# Patient Record
Sex: FEMALE | Race: BLACK OR AFRICAN AMERICAN | Employment: FULL TIME | URBAN - METROPOLITAN AREA
[De-identification: names, ages, dates, MRNs, and addresses within clinical notes are randomized per-mention and may not be internally consistent; named-entity substitution may affect disease eponyms.]

---

## 2017-01-02 ENCOUNTER — HOSPITAL ENCOUNTER (OUTPATIENT)
Dept: MAMMOGRAPHY | Age: 47
Discharge: HOME | End: 2017-01-02
Attending: NURSE PRACTITIONER
Payer: COMMERCIAL

## 2017-01-02 ENCOUNTER — HOSPITAL ENCOUNTER (OUTPATIENT)
Dept: ULTRASOUND IMAGING | Age: 47
Discharge: HOME | End: 2017-01-02
Attending: NURSE PRACTITIONER
Payer: COMMERCIAL

## 2017-01-02 DIAGNOSIS — Z00.00 ROUTINE GENERAL MEDICAL EXAMINATION AT A HEALTH CARE FACILITY: ICD-10-CM

## 2017-01-02 DIAGNOSIS — N92.0 MENORRHAGIA WITH REGULAR CYCLE: ICD-10-CM

## 2017-01-02 DIAGNOSIS — Z12.31 ENCOUNTER FOR SCREENING MAMMOGRAM FOR MALIGNANT NEOPLASM OF BREAST: ICD-10-CM

## 2017-01-02 PROCEDURE — 77067 SCR MAMMO BI INCL CAD: CPT

## 2017-01-02 PROCEDURE — 76856 US EXAM PELVIC COMPLETE: CPT

## 2017-01-02 PROCEDURE — 76830 TRANSVAGINAL US NON-OB: CPT

## 2017-01-04 ENCOUNTER — LAB ENCOUNTER (OUTPATIENT)
Dept: LAB | Age: 47
End: 2017-01-04
Attending: OBSTETRICS & GYNECOLOGY
Payer: COMMERCIAL

## 2017-01-04 DIAGNOSIS — N76.0 VAGINITIS AND VULVOVAGINITIS, UNSPECIFIED: ICD-10-CM

## 2017-01-04 DIAGNOSIS — Z01.419 WELL WOMAN EXAM: ICD-10-CM

## 2017-01-04 PROCEDURE — 87510 GARDNER VAG DNA DIR PROBE: CPT

## 2017-01-04 PROCEDURE — 87480 CANDIDA DNA DIR PROBE: CPT

## 2017-01-04 PROCEDURE — 87624 HPV HI-RISK TYP POOLED RSLT: CPT

## 2017-01-04 PROCEDURE — 88175 CYTOPATH C/V AUTO FLUID REDO: CPT

## 2017-01-04 PROCEDURE — 87660 TRICHOMONAS VAGIN DIR PROBE: CPT

## 2017-01-06 LAB — HPV I/H RISK 1 DNA SPEC QL NAA+PROBE: NEGATIVE

## 2017-02-16 RX ORDER — DOXEPIN HYDROCHLORIDE 50 MG/1
1 CAPSULE ORAL DAILY
COMMUNITY
End: 2017-10-26

## 2017-02-20 ENCOUNTER — APPOINTMENT (OUTPATIENT)
Dept: LAB | Age: 47
End: 2017-02-20
Payer: COMMERCIAL

## 2017-02-20 DIAGNOSIS — D21.9 FIBROIDS: ICD-10-CM

## 2017-02-20 LAB
ATRIAL RATE: 68 BPM
BUN BLD-MCNC: 12 MG/DL (ref 8–20)
CALCIUM BLD-MCNC: 9 MG/DL (ref 8.3–10.3)
CHLORIDE: 106 MMOL/L (ref 101–111)
CO2: 31 MMOL/L (ref 22–32)
CREAT BLD-MCNC: 0.81 MG/DL (ref 0.55–1.02)
ERYTHROCYTE [DISTWIDTH] IN BLOOD BY AUTOMATED COUNT: 12.7 % (ref 11.5–16)
GLUCOSE BLD-MCNC: 98 MG/DL (ref 70–99)
HCT VFR BLD AUTO: 39.4 % (ref 34–50)
HGB BLD-MCNC: 13.2 G/DL (ref 12–16)
MCH RBC QN AUTO: 29.7 PG (ref 27–33.2)
MCHC RBC AUTO-ENTMCNC: 33.5 G/DL (ref 31–37)
MCV RBC AUTO: 88.7 FL (ref 81–100)
P AXIS: 40 DEGREES
P-R INTERVAL: 178 MS
PLATELET # BLD AUTO: 276 10(3)UL (ref 150–450)
POTASSIUM SERPL-SCNC: 3.9 MMOL/L (ref 3.6–5.1)
Q-T INTERVAL: 386 MS
QRS DURATION: 88 MS
QTC CALCULATION (BEZET): 410 MS
R AXIS: -14 DEGREES
RBC # BLD AUTO: 4.44 X10(6)UL (ref 3.8–5.1)
RED CELL DISTRIBUTION WIDTH-SD: 41.5 FL (ref 35.1–46.3)
SODIUM SERPL-SCNC: 141 MMOL/L (ref 136–144)
T AXIS: 20 DEGREES
VENTRICULAR RATE: 68 BPM
WBC # BLD AUTO: 5.3 X10(3) UL (ref 4–13)

## 2017-02-20 PROCEDURE — 85027 COMPLETE CBC AUTOMATED: CPT

## 2017-02-20 PROCEDURE — 93010 ELECTROCARDIOGRAM REPORT: CPT | Performed by: INTERNAL MEDICINE

## 2017-02-20 PROCEDURE — 93005 ELECTROCARDIOGRAM TRACING: CPT

## 2017-02-20 PROCEDURE — 80048 BASIC METABOLIC PNL TOTAL CA: CPT

## 2017-02-20 PROCEDURE — 36415 COLL VENOUS BLD VENIPUNCTURE: CPT

## 2017-03-09 ENCOUNTER — ANESTHESIA EVENT (OUTPATIENT)
Dept: SURGERY | Facility: HOSPITAL | Age: 47
End: 2017-03-09

## 2017-03-09 NOTE — H&P
[facility]    PATIENT'S NAME: Fransisco Butcher   ATTENDING PHYSICIAN: Errol Ruvalcaba M.D.    PATIENT ACCOUNT#:   [de-identified]    LOCATION:  [facility]  MEDICAL RECORD #:   [id number]       YOB: 1970  ADMISSION DATE:       03/10/2017    HISTO

## 2017-03-10 ENCOUNTER — ANESTHESIA (OUTPATIENT)
Dept: SURGERY | Facility: HOSPITAL | Age: 47
End: 2017-03-10

## 2017-03-10 ENCOUNTER — HOSPITAL ENCOUNTER (INPATIENT)
Facility: HOSPITAL | Age: 47
LOS: 3 days | Discharge: HOME | DRG: 742 | End: 2017-03-13
Attending: OBSTETRICS & GYNECOLOGY | Admitting: OBSTETRICS & GYNECOLOGY
Payer: COMMERCIAL

## 2017-03-10 ENCOUNTER — SURGERY (OUTPATIENT)
Age: 47
End: 2017-03-10

## 2017-03-10 DIAGNOSIS — D21.9 FIBROIDS: Primary | ICD-10-CM

## 2017-03-10 LAB
BASOPHILS # BLD AUTO: 0.02 X10(3) UL (ref 0–0.1)
BASOPHILS NFR BLD AUTO: 0.4 %
EOSINOPHIL # BLD AUTO: 0.23 X10(3) UL (ref 0–0.3)
EOSINOPHIL NFR BLD AUTO: 4.4 %
ERYTHROCYTE [DISTWIDTH] IN BLOOD BY AUTOMATED COUNT: 12.5 % (ref 11.5–16)
HCT VFR BLD AUTO: 36.9 % (ref 34–50)
HGB BLD-MCNC: 12.8 G/DL (ref 12–16)
IMMATURE GRANULOCYTE COUNT: 0.01 X10(3) UL (ref 0–1)
IMMATURE GRANULOCYTE RATIO %: 0.2 %
LYMPHOCYTES # BLD AUTO: 1.87 X10(3) UL (ref 0.9–4)
LYMPHOCYTES NFR BLD AUTO: 35.9 %
MCH RBC QN AUTO: 29.8 PG (ref 27–33.2)
MCHC RBC AUTO-ENTMCNC: 34.7 G/DL (ref 31–37)
MCV RBC AUTO: 86 FL (ref 81–100)
MONOCYTES # BLD AUTO: 0.48 X10(3) UL (ref 0.1–0.6)
MONOCYTES NFR BLD AUTO: 9.2 %
NEUTROPHIL ABS PRELIM: 2.6 X10 (3) UL (ref 1.3–6.7)
NEUTROPHILS # BLD AUTO: 2.6 X10(3) UL (ref 1.3–6.7)
NEUTROPHILS NFR BLD AUTO: 49.9 %
PLATELET # BLD AUTO: 241 10(3)UL (ref 150–450)
POCT LOT NUMBER: NORMAL
POCT URINE PREGNANCY: NEGATIVE
RBC # BLD AUTO: 4.29 X10(6)UL (ref 3.8–5.1)
RED CELL DISTRIBUTION WIDTH-SD: 38.9 FL (ref 35.1–46.3)
WBC # BLD AUTO: 5.2 X10(3) UL (ref 4–13)

## 2017-03-10 PROCEDURE — 58150 TOTAL HYSTERECTOMY: CPT | Performed by: OBSTETRICS & GYNECOLOGY

## 2017-03-10 PROCEDURE — 0UT70ZZ RESECTION OF BILATERAL FALLOPIAN TUBES, OPEN APPROACH: ICD-10-PCS | Performed by: OBSTETRICS & GYNECOLOGY

## 2017-03-10 PROCEDURE — 99253 IP/OBS CNSLTJ NEW/EST LOW 45: CPT | Performed by: INTERNAL MEDICINE

## 2017-03-10 PROCEDURE — 0UTC0ZZ RESECTION OF CERVIX, OPEN APPROACH: ICD-10-PCS | Performed by: OBSTETRICS & GYNECOLOGY

## 2017-03-10 PROCEDURE — 0UT90ZZ RESECTION OF UTERUS, OPEN APPROACH: ICD-10-PCS | Performed by: OBSTETRICS & GYNECOLOGY

## 2017-03-10 RX ORDER — NALOXONE HYDROCHLORIDE 0.4 MG/ML
80 INJECTION, SOLUTION INTRAMUSCULAR; INTRAVENOUS; SUBCUTANEOUS AS NEEDED
Status: DISCONTINUED | OUTPATIENT
Start: 2017-03-10 | End: 2017-03-10 | Stop reason: HOSPADM

## 2017-03-10 RX ORDER — MORPHINE SULFATE 2 MG/ML
2 INJECTION, SOLUTION INTRAMUSCULAR; INTRAVENOUS EVERY 5 MIN PRN
Status: DISCONTINUED | OUTPATIENT
Start: 2017-03-10 | End: 2017-03-10 | Stop reason: HOSPADM

## 2017-03-10 RX ORDER — HYDROMORPHONE HYDROCHLORIDE 1 MG/ML
INJECTION, SOLUTION INTRAMUSCULAR; INTRAVENOUS; SUBCUTANEOUS
Status: COMPLETED
Start: 2017-03-10 | End: 2017-03-10

## 2017-03-10 RX ORDER — MEPERIDINE HYDROCHLORIDE 25 MG/ML
12.5 INJECTION INTRAMUSCULAR; INTRAVENOUS; SUBCUTANEOUS AS NEEDED
Status: DISCONTINUED | OUTPATIENT
Start: 2017-03-10 | End: 2017-03-10 | Stop reason: HOSPADM

## 2017-03-10 RX ORDER — SIMETHICONE 80 MG
80 TABLET,CHEWABLE ORAL EVERY 8 HOURS PRN
Status: DISCONTINUED | OUTPATIENT
Start: 2017-03-10 | End: 2017-03-13

## 2017-03-10 RX ORDER — HYDROMORPHONE HYDROCHLORIDE 1 MG/ML
0.4 INJECTION, SOLUTION INTRAMUSCULAR; INTRAVENOUS; SUBCUTANEOUS EVERY 5 MIN PRN
Status: DISCONTINUED | OUTPATIENT
Start: 2017-03-10 | End: 2017-03-10 | Stop reason: HOSPADM

## 2017-03-10 RX ORDER — HYDROCODONE BITARTRATE AND ACETAMINOPHEN 5; 325 MG/1; MG/1
1 TABLET ORAL EVERY 4 HOURS PRN
Status: DISCONTINUED | OUTPATIENT
Start: 2017-03-10 | End: 2017-03-13

## 2017-03-10 RX ORDER — ONDANSETRON 2 MG/ML
INJECTION INTRAMUSCULAR; INTRAVENOUS
Status: COMPLETED
Start: 2017-03-10 | End: 2017-03-10

## 2017-03-10 RX ORDER — LABETALOL 200 MG/1
300 TABLET, FILM COATED ORAL DAILY
Status: DISCONTINUED | OUTPATIENT
Start: 2017-03-11 | End: 2017-03-13

## 2017-03-10 RX ORDER — NALBUPHINE HCL 10 MG/ML
2.5 AMPUL (ML) INJECTION EVERY 4 HOURS PRN
Status: DISCONTINUED | OUTPATIENT
Start: 2017-03-10 | End: 2017-03-13

## 2017-03-10 RX ORDER — KETOROLAC TROMETHAMINE 30 MG/ML
30 INJECTION, SOLUTION INTRAMUSCULAR; INTRAVENOUS EVERY 6 HOURS PRN
Status: ACTIVE | OUTPATIENT
Start: 2017-03-10 | End: 2017-03-12

## 2017-03-10 RX ORDER — HYDROCODONE BITARTRATE AND ACETAMINOPHEN 5; 325 MG/1; MG/1
1 TABLET ORAL EVERY 4 HOURS PRN
Status: DISCONTINUED | OUTPATIENT
Start: 2017-03-10 | End: 2017-03-10

## 2017-03-10 RX ORDER — ACETAMINOPHEN 325 MG/1
650 TABLET ORAL EVERY 6 HOURS
Status: DISCONTINUED | OUTPATIENT
Start: 2017-03-10 | End: 2017-03-13

## 2017-03-10 RX ORDER — ONDANSETRON 4 MG/1
4 TABLET, FILM COATED ORAL EVERY 8 HOURS PRN
Status: DISCONTINUED | OUTPATIENT
Start: 2017-03-10 | End: 2017-03-13

## 2017-03-10 RX ORDER — HEPARIN SODIUM 5000 [USP'U]/ML
5000 INJECTION, SOLUTION INTRAVENOUS; SUBCUTANEOUS ONCE
Status: COMPLETED | OUTPATIENT
Start: 2017-03-10 | End: 2017-03-10

## 2017-03-10 RX ORDER — ONDANSETRON 2 MG/ML
4 INJECTION INTRAMUSCULAR; INTRAVENOUS AS NEEDED
Status: DISCONTINUED | OUTPATIENT
Start: 2017-03-10 | End: 2017-03-10 | Stop reason: HOSPADM

## 2017-03-10 RX ORDER — KETOROLAC TROMETHAMINE 30 MG/ML
30 INJECTION, SOLUTION INTRAMUSCULAR; INTRAVENOUS EVERY 6 HOURS PRN
Status: DISCONTINUED | OUTPATIENT
Start: 2017-03-10 | End: 2017-03-10

## 2017-03-10 RX ORDER — GABAPENTIN 100 MG/1
100 CAPSULE ORAL 3 TIMES DAILY
Status: DISCONTINUED | OUTPATIENT
Start: 2017-03-10 | End: 2017-03-12

## 2017-03-10 RX ORDER — SODIUM CHLORIDE, SODIUM LACTATE, POTASSIUM CHLORIDE, CALCIUM CHLORIDE 600; 310; 30; 20 MG/100ML; MG/100ML; MG/100ML; MG/100ML
INJECTION, SOLUTION INTRAVENOUS CONTINUOUS
Status: DISCONTINUED | OUTPATIENT
Start: 2017-03-10 | End: 2017-03-11

## 2017-03-10 RX ORDER — HEPARIN SODIUM 5000 [USP'U]/ML
INJECTION, SOLUTION INTRAVENOUS; SUBCUTANEOUS
Status: DISPENSED
Start: 2017-03-10 | End: 2017-03-10

## 2017-03-10 RX ORDER — ZOLPIDEM TARTRATE 5 MG/1
5 TABLET ORAL NIGHTLY PRN
Status: DISCONTINUED | OUTPATIENT
Start: 2017-03-10 | End: 2017-03-13

## 2017-03-10 RX ORDER — METOCLOPRAMIDE HYDROCHLORIDE 5 MG/ML
10 INJECTION INTRAMUSCULAR; INTRAVENOUS AS NEEDED
Status: DISCONTINUED | OUTPATIENT
Start: 2017-03-10 | End: 2017-03-10 | Stop reason: HOSPADM

## 2017-03-10 RX ORDER — ONDANSETRON 2 MG/ML
4 INJECTION INTRAMUSCULAR; INTRAVENOUS EVERY 8 HOURS PRN
Status: DISCONTINUED | OUTPATIENT
Start: 2017-03-10 | End: 2017-03-13

## 2017-03-10 RX ORDER — IBUPROFEN 600 MG/1
600 TABLET ORAL EVERY 6 HOURS
Status: DISCONTINUED | OUTPATIENT
Start: 2017-03-10 | End: 2017-03-11

## 2017-03-10 RX ORDER — HYDROCODONE BITARTRATE AND ACETAMINOPHEN 5; 325 MG/1; MG/1
2 TABLET ORAL EVERY 4 HOURS PRN
Status: DISCONTINUED | OUTPATIENT
Start: 2017-03-10 | End: 2017-03-10

## 2017-03-10 RX ORDER — DEXTROSE, SODIUM CHLORIDE, SODIUM LACTATE, POTASSIUM CHLORIDE, AND CALCIUM CHLORIDE 5; .6; .31; .03; .02 G/100ML; G/100ML; G/100ML; G/100ML; G/100ML
INJECTION, SOLUTION INTRAVENOUS CONTINUOUS
Status: DISCONTINUED | OUTPATIENT
Start: 2017-03-10 | End: 2017-03-12

## 2017-03-10 RX ORDER — DIPHENHYDRAMINE HYDROCHLORIDE 50 MG/ML
12.5 INJECTION INTRAMUSCULAR; INTRAVENOUS AS NEEDED
Status: DISCONTINUED | OUTPATIENT
Start: 2017-03-10 | End: 2017-03-10 | Stop reason: HOSPADM

## 2017-03-10 RX ORDER — METOCLOPRAMIDE HYDROCHLORIDE 5 MG/ML
10 INJECTION INTRAMUSCULAR; INTRAVENOUS EVERY 8 HOURS PRN
Status: DISCONTINUED | OUTPATIENT
Start: 2017-03-10 | End: 2017-03-13

## 2017-03-10 RX ORDER — HYDROCODONE BITARTRATE AND ACETAMINOPHEN 5; 325 MG/1; MG/1
2 TABLET ORAL EVERY 4 HOURS PRN
Status: DISCONTINUED | OUTPATIENT
Start: 2017-03-10 | End: 2017-03-13

## 2017-03-10 RX ORDER — DROPERIDOL 2.5 MG/ML
0.62 INJECTION, SOLUTION INTRAMUSCULAR; INTRAVENOUS AS NEEDED
Status: DISCONTINUED | OUTPATIENT
Start: 2017-03-10 | End: 2017-03-10 | Stop reason: RX

## 2017-03-10 RX ORDER — DOCUSATE SODIUM 100 MG/1
100 CAPSULE, LIQUID FILLED ORAL 2 TIMES DAILY
Status: DISCONTINUED | OUTPATIENT
Start: 2017-03-10 | End: 2017-03-13

## 2017-03-10 RX ORDER — ONDANSETRON 2 MG/ML
4 INJECTION INTRAMUSCULAR; INTRAVENOUS EVERY 6 HOURS PRN
Status: DISCONTINUED | OUTPATIENT
Start: 2017-03-10 | End: 2017-03-13

## 2017-03-10 RX ORDER — DIPHENHYDRAMINE HYDROCHLORIDE 50 MG/ML
12.5 INJECTION INTRAMUSCULAR; INTRAVENOUS EVERY 4 HOURS PRN
Status: DISCONTINUED | OUTPATIENT
Start: 2017-03-10 | End: 2017-03-11

## 2017-03-10 RX ORDER — AMLODIPINE BESYLATE 5 MG/1
5 TABLET ORAL
Status: DISCONTINUED | OUTPATIENT
Start: 2017-03-11 | End: 2017-03-13

## 2017-03-10 RX ORDER — NALOXONE HYDROCHLORIDE 0.4 MG/ML
0.08 INJECTION, SOLUTION INTRAMUSCULAR; INTRAVENOUS; SUBCUTANEOUS
Status: DISCONTINUED | OUTPATIENT
Start: 2017-03-10 | End: 2017-03-13

## 2017-03-10 RX ORDER — KETOROLAC TROMETHAMINE 15 MG/ML
15 INJECTION, SOLUTION INTRAMUSCULAR; INTRAVENOUS EVERY 6 HOURS PRN
Status: ACTIVE | OUTPATIENT
Start: 2017-03-10 | End: 2017-03-12

## 2017-03-10 NOTE — BRIEF OP NOTE
[facility] SURGERY  Brief Op Note     Pricilla Evans Location: OR   CSN [id number] MRN [id number]   Admission Date 3/10/2017 Operation Date 3/10/2017   Attending Physician Edi Linda MD Operating Physician Carla Penaloza MD       Pre-Operative Diagnos

## 2017-03-10 NOTE — ANESTHESIA POSTPROCEDURE EVALUATION
[address] Patient Status:  Surgery Admit   Age/Gender 41year old female MRN [id number]   Location [facility] Attending Ha Lopez MD   Hosp Day # 0 PCP Najma Long MD       Anesthesia Post-op

## 2017-03-10 NOTE — CONSULTS
[address] [facility] Patient Status:  Inpatient    2/3/1970 MRN [id number]   [facility] 3NW-A Attending Lisette Waldrop MD   Hosp Day # 0 PCP Dominique Ramirez MD     Reason for consult: medical management    Reque noted in the HPI. Physical Exam:    /83 mmHg  Pulse 75  Temp(Src) 97.6 °F (36.4 °C) (Oral)  Resp 14  Ht 5' 2\" (1.575 m)  Wt 251 lb 5.2 oz (114 kg)  BMI 45.96 kg/m2  SpO2 96%  LMP 01/23/2017  General: No acute distress. Alert and oriented x 3.   HE

## 2017-03-11 LAB
ALBUMIN SERPL-MCNC: 2.9 G/DL (ref 3.5–4.8)
ALP LIVER SERPL-CCNC: 50 U/L (ref 37–98)
ALT SERPL-CCNC: 7 U/L (ref 14–54)
AST SERPL-CCNC: 11 U/L (ref 15–41)
BILIRUB SERPL-MCNC: 0.6 MG/DL (ref 0.1–2)
BUN BLD-MCNC: 20 MG/DL (ref 8–20)
CALCIUM BLD-MCNC: 8.7 MG/DL (ref 8.3–10.3)
CHLORIDE: 104 MMOL/L (ref 101–111)
CO2: 28 MMOL/L (ref 22–32)
CREAT BLD-MCNC: 2.17 MG/DL (ref 0.55–1.02)
CREAT UR-SCNC: 382 MG/DL
ERYTHROCYTE [DISTWIDTH] IN BLOOD BY AUTOMATED COUNT: 12.6 % (ref 11.5–16)
GLUCOSE BLD-MCNC: 128 MG/DL (ref 70–99)
HCT VFR BLD AUTO: 27.7 % (ref 34–50)
HGB BLD-MCNC: 9.5 G/DL (ref 12–16)
M PROTEIN MFR SERPL ELPH: 6.3 G/DL (ref 6.1–8.3)
MCH RBC QN AUTO: 30.2 PG (ref 27–33.2)
MCHC RBC AUTO-ENTMCNC: 34.3 G/DL (ref 31–37)
MCV RBC AUTO: 87.9 FL (ref 81–100)
PLATELET # BLD AUTO: 241 10(3)UL (ref 150–450)
POTASSIUM SERPL-SCNC: 4.4 MMOL/L (ref 3.6–5.1)
RBC # BLD AUTO: 3.15 X10(6)UL (ref 3.8–5.1)
RED CELL DISTRIBUTION WIDTH-SD: 40.4 FL (ref 35.1–46.3)
SODIUM SERPL-SCNC: 140 MMOL/L (ref 136–144)
SODIUM SERPL-SCNC: 8 MMOL/L
WBC # BLD AUTO: 13.5 X10(3) UL (ref 4–13)

## 2017-03-11 PROCEDURE — 99233 SBSQ HOSP IP/OBS HIGH 50: CPT | Performed by: INTERNAL MEDICINE

## 2017-03-11 RX ORDER — SODIUM CHLORIDE 9 MG/ML
INJECTION, SOLUTION INTRAVENOUS ONCE
Status: COMPLETED | OUTPATIENT
Start: 2017-03-11 | End: 2017-03-11

## 2017-03-11 NOTE — PROGRESS NOTES
3/11/17 CALL PLACED TO DR Linda Owusu AS URINE OUTPUT REMAINS LOW AFTER PREVIOUS IV BOLUS GIVEN THIS AM. URINE OUTPUT SINCE 0700 IS  150 ML. DR Linda Owusu AWARE OF CREATNINE LEVEL 2.17 TODAY. WRITER ADVISED TO INFORM DR Kai Busby. PT AWARE AND AGREES.  CONT TO

## 2017-03-11 NOTE — PROGRESS NOTES
3/11/17 CALL PLACED TO DR Susan Tapia. Burak Sampson REGARDING LOW URINE OUTPUT AND ELEVATED CREATNINE LEVEL PER  [address] [place] [address]. ALL QUESTIONS ANSWERED. NO NEW ORDERS RECEIVED. CONT TO MONITOR.

## 2017-03-11 NOTE — OPERATIVE REPORT
[address]    PATIENT'S NAME: Adelfo Jerome   ATTENDING PHYSICIAN: Vinh Nunez M.D. OPERATING PHYSICIAN: Vinh Nunez M.D.    PATIENT ACCOUNT#:   [de-identified]    LOCATION:  [address] [place] [address]  MEDICAL RECORD #:   [id number]       DATE OF BIRTH:  [birth year] patient went to recovery room in good condition, had an IV and Cee in place.     Dictated By Tawanna Laws M.D.  d: 03/10/2017 08:56:07  t: 03/10/2017 18:35:00  [facility] [id number]  O/

## 2017-03-11 NOTE — PROGRESS NOTES
[facility]ITAL[facility]  Progress Note     Estefany Oneill Patient Status:  Inpatient    2/3/1970 MRN [id number]   [facility] 3NW-A Attending Maritza Eldridge MD   Hosp Day # 1 PCP Ros Burton MD     Chief Complaint: medical management    S: P NSAIDS   Possible pre renal  - cont IVF   Maybe ATN from NSAID   Continue aggressive IVF and BMP tmw    Check FeNa  3. Essential Hypertension - resume PO meds today, elevated BP   4. Morbid obesity    5. Neuropathy - resume gabapentin  6.  Anemia - expected

## 2017-03-11 NOTE — PROGRESS NOTES
PT WITH LOW URINE OUTPUT, PT BLADDER SCANNED, RESULTS LESS THAN 100ML. DR Yasmeen Recinos NOTIFIED, WILL WAIT FOR FURTHER ORDERS.

## 2017-03-11 NOTE — PROGRESS NOTES
3/11/17 CALL PLACED TO DR Sapna Brian AS URINE OUTPUT IS ONLY 75 ML YELLOW URINE SINCE 0700 THIS AM. AWAITING RETURN CALL. PT AWARE AND AGREES. CONT TO MONITOR.

## 2017-03-11 NOTE — PLAN OF CARE
PAIN - ADULT    • Verbalizes/displays adequate comfort level or patient's stated pain goal Progressing        Patient/Family Goals    • Patient/Family Long Term Goal Progressing        RISK FOR INFECTION - ADULT    • Absence of fever/infection during antic

## 2017-03-11 NOTE — PROGRESS NOTES
POD 1  No complaints, no flatus, pain is controlled    Recent Labs   Lab  03/10/17   0620  03/11/17   0724   RBC  4.29  3.15*   HGB  12.8  9.5*   HCT  36.9  27.7*   MCV  86.0  87.9   MCH  29.8  30.2   MCHC  34.7  34.3   RDW  12.5  12.6   NEPRELIM  2.60   -

## 2017-03-12 ENCOUNTER — APPOINTMENT (OUTPATIENT)
Dept: GENERAL RADIOLOGY | Facility: HOSPITAL | Age: 47
DRG: 742 | End: 2017-03-12
Attending: OBSTETRICS & GYNECOLOGY
Payer: COMMERCIAL

## 2017-03-12 LAB
ALBUMIN SERPL-MCNC: 3.2 G/DL (ref 3.5–4.8)
ALP LIVER SERPL-CCNC: 54 U/L (ref 37–98)
ALT SERPL-CCNC: 8 U/L (ref 14–54)
AMYLASE: 158 U/L (ref 25–115)
AST SERPL-CCNC: 18 U/L (ref 15–41)
BASOPHILS # BLD AUTO: 0.02 X10(3) UL (ref 0–0.1)
BASOPHILS NFR BLD AUTO: 0.2 %
BILIRUB SERPL-MCNC: 0.7 MG/DL (ref 0.1–2)
BUN BLD-MCNC: 11 MG/DL (ref 8–20)
BUN BLD-MCNC: 19 MG/DL (ref 8–20)
CALCIUM BLD-MCNC: 9.1 MG/DL (ref 8.3–10.3)
CALCIUM BLD-MCNC: 9.3 MG/DL (ref 8.3–10.3)
CHLORIDE: 104 MMOL/L (ref 101–111)
CHLORIDE: 107 MMOL/L (ref 101–111)
CO2: 28 MMOL/L (ref 22–32)
CO2: 30 MMOL/L (ref 22–32)
CREAT BLD-MCNC: 0.74 MG/DL (ref 0.55–1.02)
CREAT BLD-MCNC: 1.06 MG/DL (ref 0.55–1.02)
EOSINOPHIL # BLD AUTO: 0.08 X10(3) UL (ref 0–0.3)
EOSINOPHIL NFR BLD AUTO: 0.8 %
ERYTHROCYTE [DISTWIDTH] IN BLOOD BY AUTOMATED COUNT: 12.6 % (ref 11.5–16)
GLUCOSE BLD-MCNC: 112 MG/DL (ref 70–99)
GLUCOSE BLD-MCNC: 120 MG/DL (ref 70–99)
HCT VFR BLD AUTO: 24.8 % (ref 34–50)
HGB BLD-MCNC: 8.4 G/DL (ref 12–16)
IMMATURE GRANULOCYTE COUNT: 0.04 X10(3) UL (ref 0–1)
IMMATURE GRANULOCYTE RATIO %: 0.4 %
LIPASE: 78 U/L (ref 73–393)
LYMPHOCYTES # BLD AUTO: 2.61 X10(3) UL (ref 0.9–4)
LYMPHOCYTES NFR BLD AUTO: 27.3 %
M PROTEIN MFR SERPL ELPH: 7 G/DL (ref 6.1–8.3)
MCH RBC QN AUTO: 29.7 PG (ref 27–33.2)
MCHC RBC AUTO-ENTMCNC: 33.9 G/DL (ref 31–37)
MCV RBC AUTO: 87.6 FL (ref 81–100)
MONOCYTES # BLD AUTO: 0.88 X10(3) UL (ref 0.1–0.6)
MONOCYTES NFR BLD AUTO: 9.2 %
NEUTROPHIL ABS PRELIM: 5.93 X10 (3) UL (ref 1.3–6.7)
NEUTROPHILS # BLD AUTO: 5.93 X10(3) UL (ref 1.3–6.7)
NEUTROPHILS NFR BLD AUTO: 62.1 %
PLATELET # BLD AUTO: 210 10(3)UL (ref 150–450)
POTASSIUM SERPL-SCNC: 3.8 MMOL/L (ref 3.6–5.1)
POTASSIUM SERPL-SCNC: 3.9 MMOL/L (ref 3.6–5.1)
RBC # BLD AUTO: 2.83 X10(6)UL (ref 3.8–5.1)
RED CELL DISTRIBUTION WIDTH-SD: 40.5 FL (ref 35.1–46.3)
SODIUM SERPL-SCNC: 140 MMOL/L (ref 136–144)
SODIUM SERPL-SCNC: 140 MMOL/L (ref 136–144)
WBC # BLD AUTO: 9.6 X10(3) UL (ref 4–13)

## 2017-03-12 PROCEDURE — 74000 XR ABDOMEN (1 VIEW) (CPT=74000): CPT

## 2017-03-12 PROCEDURE — 99232 SBSQ HOSP IP/OBS MODERATE 35: CPT | Performed by: INTERNAL MEDICINE

## 2017-03-12 RX ORDER — HYDROCODONE BITARTRATE AND ACETAMINOPHEN 5; 325 MG/1; MG/1
1 TABLET ORAL EVERY 4 HOURS PRN
Qty: 30 TABLET | Refills: 0 | Status: SHIPPED | OUTPATIENT
Start: 2017-03-12 | End: 2017-03-21 | Stop reason: ALTCHOICE

## 2017-03-12 RX ORDER — DEXTROSE, SODIUM CHLORIDE, SODIUM LACTATE, POTASSIUM CHLORIDE, AND CALCIUM CHLORIDE 5; .6; .31; .03; .02 G/100ML; G/100ML; G/100ML; G/100ML; G/100ML
INJECTION, SOLUTION INTRAVENOUS CONTINUOUS
Status: DISCONTINUED | OUTPATIENT
Start: 2017-03-12 | End: 2017-03-13

## 2017-03-12 RX ORDER — POTASSIUM CHLORIDE 20 MEQ/1
40 TABLET, EXTENDED RELEASE ORAL ONCE
Status: COMPLETED | OUTPATIENT
Start: 2017-03-12 | End: 2017-03-12

## 2017-03-12 NOTE — PROGRESS NOTES
POD#2  Patient has no complaints, pain is controlled, had b.m., passing gas    /77 mmHg  Pulse 105  Temp(Src) 98.6 °F (37 °C) (Oral)  Resp 18  Ht 62\"  Wt 251 lb 5.2 oz  BMI 45.96 kg/m2  SpO2 95%  LMP 01/23/2017    Recent Labs   Lab  03/10/17   8643

## 2017-03-12 NOTE — DISCHARGE SUMMARY
Admittion Date: 3/10/17  Discharge Date: 3/12/17  Diagnosis: uterine fibroids  Procedure: WILDER  Surgeon: Nash Uriarte. stay: patient developed acute renal insufficiency postop, which resolved on post op day 1, sent home on day 2 in stable condition.  R

## 2017-03-12 NOTE — PROGRESS NOTES
[facility] HOSPITAL[facility]  Progress Note     Shweta Fuentes Patient Status:  Inpatient    2/3/1970 MRN [id number]   [facility] 3NW-A Attending Dyllan Mathews MD   Hosp Day # 2 PCP Durand Sandifer, MD     Chief Complaint: medical management    S: P #2 - management per primary, pain regimen, Bowel regimen   Safe for DC today  2. LUKE - FeNa <1%, was very dry,    Reversed today, continue encourages PO intake   3. Essential Hypertension - resume PO meds today, elevated BP   4. Morbid obesity    5.  Neurop

## 2017-03-12 NOTE — PLAN OF CARE
PAIN - ADULT    • Verbalizes/displays adequate comfort level or patient's stated pain goal Progressing        RISK FOR INFECTION - ADULT    • Absence of fever/infection during anticipated neutropenic period Progressing          Assumed care at 0730. VSS.

## 2017-03-13 VITALS
RESPIRATION RATE: 18 BRPM | HEIGHT: 62 IN | WEIGHT: 251.31 LBS | OXYGEN SATURATION: 97 % | DIASTOLIC BLOOD PRESSURE: 72 MMHG | HEART RATE: 80 BPM | TEMPERATURE: 99 F | SYSTOLIC BLOOD PRESSURE: 122 MMHG | BODY MASS INDEX: 46.25 KG/M2

## 2017-03-13 LAB — POTASSIUM SERPL-SCNC: 3.8 MMOL/L (ref 3.6–5.1)

## 2017-03-13 PROCEDURE — 99232 SBSQ HOSP IP/OBS MODERATE 35: CPT | Performed by: INTERNAL MEDICINE

## 2017-03-13 RX ORDER — POTASSIUM CHLORIDE 20 MEQ/1
40 TABLET, EXTENDED RELEASE ORAL ONCE
Status: COMPLETED | OUTPATIENT
Start: 2017-03-13 | End: 2017-03-13

## 2017-03-13 NOTE — PROGRESS NOTES
[facility] HOSPITAL[facility]  Progress Note     Clotilde Chatterjee Patient Status:  Inpatient    2/3/1970 MRN [id number]   [facility] 3NW-A Attending Pricilla Meade MD   Hosp Day # 3 PCP Jeanie Fowler MD     Chief Complaint: medical management    S: P Besylate  5 mg Oral Daily   • Labetalol HCl  300 mg Oral Daily       ASSESSMENT / PLAN:     1. Uterine fibroids s/p WILDER pod #3 - management per primary, pain regimen, Bowel regimen   Safe for DC today  2.  LUKE - FeNa <1%, was very dry,    Reversed today, co

## 2017-03-13 NOTE — PROGRESS NOTES
[facility]  Progress Note    Jean Marie Patient Status:  Inpatient    2/3/1970 MRN [id number]   [facility] 3NW-A Attending Chica Downs MD   Hosp Day # 3 PCP Bakrai Crain MD     Subjective:  Had nausea yeasterday and discharg

## 2017-03-13 NOTE — PAYOR COMM NOTE
Attending Physician: Eugene Guzman MD    Review Type: ADMISSION   Reviewer: Gabbi Loredo       Date: March 13, 2017 - 11:26 AM  Payor: MICHELLE CHRISTINA  Authorization Number: [id number]  Admit date: 3/10/2017  5:33 AM   Admitted from Emergency Dept.: yes    HISTO Wt 251 lb 5.2 oz (114 kg)  BMI 45.96 kg/m2  SpO2 93%  LMP 01/23/2017  Lungs:   clear  Abdomen:  Bowel sounds present.  Incisions clean, dry, and well approximated. Soft, non-distended, non-tender, with no rebound or guarding.  No peritoneal signs.   Extrem Davis Varela, RN      Labetalol HCl (NORMODYNE) tab 300 mg     Date Action Dose Route User    3/13/2017 7803 Given 300 mg Oral Davis Dunn RN      Metoclopramide HCl (REGLAN) injection 10 mg     Date Action Dose Route User    3/12/2017 1244 Given 10 DIFFERENTIAL WITH PLATELET.   Procedure                               Abnormality         Status                     ---------                               -----------         ------                     CBC W/ DIFFERENTIAL[[id number]]

## 2017-03-21 ENCOUNTER — LAB ENCOUNTER (OUTPATIENT)
Dept: LAB | Age: 47
End: 2017-03-21
Attending: NURSE PRACTITIONER
Payer: COMMERCIAL

## 2017-03-21 ENCOUNTER — OFFICE VISIT (OUTPATIENT)
Dept: INTERNAL MEDICINE CLINIC | Facility: CLINIC | Age: 47
End: 2017-03-21

## 2017-03-21 VITALS
SYSTOLIC BLOOD PRESSURE: 104 MMHG | DIASTOLIC BLOOD PRESSURE: 60 MMHG | HEART RATE: 84 BPM | TEMPERATURE: 99 F | HEIGHT: 60 IN

## 2017-03-21 DIAGNOSIS — N17.9 AKI (ACUTE KIDNEY INJURY) (HCC): ICD-10-CM

## 2017-03-21 DIAGNOSIS — K62.5 RECTAL BLEEDING: ICD-10-CM

## 2017-03-21 DIAGNOSIS — I10 ESSENTIAL HYPERTENSION: Primary | ICD-10-CM

## 2017-03-21 DIAGNOSIS — Z90.710 S/P HYSTERECTOMY: ICD-10-CM

## 2017-03-21 DIAGNOSIS — D64.9 ANEMIA, UNSPECIFIED TYPE: ICD-10-CM

## 2017-03-21 DIAGNOSIS — R35.0 URINARY FREQUENCY: ICD-10-CM

## 2017-03-21 DIAGNOSIS — I10 ESSENTIAL HYPERTENSION: ICD-10-CM

## 2017-03-21 LAB
ALBUMIN SERPL-MCNC: 3.1 G/DL (ref 3.5–4.8)
ALP LIVER SERPL-CCNC: 63 U/L (ref 37–98)
ALT SERPL-CCNC: 10 U/L (ref 14–54)
APPEARANCE: CLEAR
AST SERPL-CCNC: 18 U/L (ref 15–41)
BASOPHILS # BLD AUTO: 0.03 X10(3) UL (ref 0–0.1)
BASOPHILS NFR BLD AUTO: 0.3 %
BILIRUB SERPL-MCNC: 0.9 MG/DL (ref 0.1–2)
BUN BLD-MCNC: 12 MG/DL (ref 8–20)
CALCIUM BLD-MCNC: 9.2 MG/DL (ref 8.3–10.3)
CHLORIDE: 104 MMOL/L (ref 101–111)
CO2: 28 MMOL/L (ref 22–32)
CREAT BLD-MCNC: 0.75 MG/DL (ref 0.55–1.02)
EOSINOPHIL # BLD AUTO: 0.25 X10(3) UL (ref 0–0.3)
EOSINOPHIL NFR BLD AUTO: 2.3 %
ERYTHROCYTE [DISTWIDTH] IN BLOOD BY AUTOMATED COUNT: 13.4 % (ref 11.5–16)
GLUCOSE BLD-MCNC: 84 MG/DL (ref 70–99)
HCT VFR BLD AUTO: 31.3 % (ref 34–50)
HGB BLD-MCNC: 10.4 G/DL (ref 12–16)
IMMATURE GRANULOCYTE COUNT: 0.07 X10(3) UL (ref 0–1)
IMMATURE GRANULOCYTE RATIO %: 0.7 %
LYMPHOCYTES # BLD AUTO: 1.82 X10(3) UL (ref 0.9–4)
LYMPHOCYTES NFR BLD AUTO: 17 %
M PROTEIN MFR SERPL ELPH: 7.3 G/DL (ref 6.1–8.3)
MCH RBC QN AUTO: 30.1 PG (ref 27–33.2)
MCHC RBC AUTO-ENTMCNC: 33.2 G/DL (ref 31–37)
MCV RBC AUTO: 90.5 FL (ref 81–100)
MONOCYTES # BLD AUTO: 0.75 X10(3) UL (ref 0.1–0.6)
MONOCYTES NFR BLD AUTO: 7 %
MULTISTIX LOT#: ABNORMAL NUMERIC
NEUTROPHIL ABS PRELIM: 7.77 X10 (3) UL (ref 1.3–6.7)
NEUTROPHILS # BLD AUTO: 7.77 X10(3) UL (ref 1.3–6.7)
NEUTROPHILS NFR BLD AUTO: 72.7 %
PH, URINE: 6 (ref 4.5–8)
PLATELET # BLD AUTO: 436 10(3)UL (ref 150–450)
POTASSIUM SERPL-SCNC: 4 MMOL/L (ref 3.6–5.1)
RBC # BLD AUTO: 3.46 X10(6)UL (ref 3.8–5.1)
RED CELL DISTRIBUTION WIDTH-SD: 43.2 FL (ref 35.1–46.3)
SODIUM SERPL-SCNC: 139 MMOL/L (ref 136–144)
SPECIFIC GRAVITY: 1.02 (ref 1–1.03)
URINE-COLOR: YELLOW
UROBILINOGEN,SEMI-QN: 1 MG/DL (ref 0–1.9)
WBC # BLD AUTO: 10.7 X10(3) UL (ref 4–13)

## 2017-03-21 PROCEDURE — 80053 COMPREHEN METABOLIC PANEL: CPT

## 2017-03-21 PROCEDURE — 99214 OFFICE O/P EST MOD 30 MIN: CPT | Performed by: NURSE PRACTITIONER

## 2017-03-21 PROCEDURE — 85025 COMPLETE CBC W/AUTO DIFF WBC: CPT

## 2017-03-21 PROCEDURE — 81003 URINALYSIS AUTO W/O SCOPE: CPT | Performed by: NURSE PRACTITIONER

## 2017-03-21 NOTE — PROGRESS NOTES
Collin Iraheta is a 41year old female. Patient presents with:  Hospital F/U: had a hysterectomy 3-10-17. She states shes doing fine. Blood In Stool: blood in stool for 3 days.  Everytime she goes there is blood in the toilet      HPI:   Here for [employer] tablet by mouth daily. Disp:  Rfl:       Past Medical History   Diagnosis Date   • Unspecified essential hypertension 6/29/2012   • Visual impairment      contacts and glasses   • PONV (postoperative nausea and vomiting)    • Morbid obesity with BMI of 45. frequency  Urine for culture. Rectal bleeding   appt made with Dr Serene Mena for 1: 39 on Thursday. No iron at this time. She is adding colace for now.        Orders Placed This Encounter  COMP METABOLIC PANEL  CBC W/DIFF    Meds & Refills for this Visit:

## 2017-03-23 ENCOUNTER — OFFICE VISIT (OUTPATIENT)
Dept: SURGERY | Facility: CLINIC | Age: 47
End: 2017-03-23

## 2017-03-23 VITALS — BODY MASS INDEX: 45.31 KG/M2 | WEIGHT: 240 LBS | TEMPERATURE: 98 F | HEIGHT: 61 IN | RESPIRATION RATE: 18 BRPM

## 2017-03-23 DIAGNOSIS — K62.5 RECTAL BLEEDING: Primary | ICD-10-CM

## 2017-03-23 PROCEDURE — 46600 DIAGNOSTIC ANOSCOPY SPX: CPT | Performed by: SURGERY

## 2017-03-23 PROCEDURE — 99243 OFF/OP CNSLTJ NEW/EST LOW 30: CPT | Performed by: SURGERY

## 2017-03-28 ENCOUNTER — TELEPHONE (OUTPATIENT)
Dept: SURGERY | Facility: CLINIC | Age: 47
End: 2017-03-28

## 2017-03-28 RX ORDER — POLYETHYLENE GLYCOL 3350, SODIUM CHLORIDE, SODIUM BICARBONATE, POTASSIUM CHLORIDE 420; 11.2; 5.72; 1.48 G/4L; G/4L; G/4L; G/4L
POWDER, FOR SOLUTION ORAL
Qty: 1 BOTTLE | Refills: 0 | Status: SHIPPED | OUTPATIENT
Start: 2017-03-28 | End: 2017-04-15

## 2017-03-29 PROBLEM — K62.5 RECTAL BLEEDING: Status: ACTIVE | Noted: 2017-03-29

## 2017-03-29 RX ORDER — POLYETHYLENE GLYCOL 3350, SODIUM CHLORIDE, SODIUM BICARBONATE, POTASSIUM CHLORIDE 420; 11.2; 5.72; 1.48 G/4L; G/4L; G/4L; G/4L
POWDER, FOR SOLUTION ORAL
Qty: 1 BOTTLE | Refills: 0 | Status: SHIPPED | OUTPATIENT
Start: 2017-03-29 | End: 2017-10-23 | Stop reason: ALTCHOICE

## 2017-03-29 NOTE — H&P
New Patient  Luis Alfredo King  2/3/1970    3/29/2017    This patient was referred by Paul Feliciano MD for evaluation and consultation for rectal bleeding. Chief Complaint: blood in stool    History of Present Illness:  The patient is a 41year old femal Concern    Exercise No     Social History Narrative         Current outpatient prescriptions:   •  multivitamin Oral Tab, Take 1 tablet by mouth daily. , Disp: , Rfl:   •  Labetalol HCl 300 MG Oral Tab, Take 1 tablet (300 mg total) by mouth daily. , Disp: 90 benefits, outcomes/recovery and alternatives to any proposed surgery were also fully reviewed with the patient. Any proposed surgical procedure was described in detail. The patient verbalizes understanding.   All questions from the patient were discussed i

## 2017-04-15 ENCOUNTER — OFFICE VISIT (OUTPATIENT)
Dept: OBGYN CLINIC | Facility: CLINIC | Age: 47
End: 2017-04-15

## 2017-04-15 VITALS
HEART RATE: 72 BPM | DIASTOLIC BLOOD PRESSURE: 80 MMHG | BODY MASS INDEX: 46.82 KG/M2 | HEIGHT: 61 IN | WEIGHT: 248 LBS | SYSTOLIC BLOOD PRESSURE: 126 MMHG | RESPIRATION RATE: 18 BRPM

## 2017-04-15 DIAGNOSIS — D25.9 UTERINE LEIOMYOMA, UNSPECIFIED LOCATION: Primary | ICD-10-CM

## 2017-04-15 NOTE — PROGRESS NOTES
Matt Fontenot is a 41year old female. HPI:   Patient presents with: Follow - Up: surgery hysterectomy.        No c/o  No hot flashed or noc sweats    Medications (Active prior to today's visit):    Current Outpatient Prescriptions:  PEG 3350-KCl-Na Bi

## 2017-04-20 ENCOUNTER — TELEPHONE (OUTPATIENT)
Dept: OBGYN CLINIC | Facility: CLINIC | Age: 47
End: 2017-04-20

## 2017-10-01 ENCOUNTER — APPOINTMENT (OUTPATIENT)
Dept: CT IMAGING | Facility: HOSPITAL | Age: 47
End: 2017-10-01
Attending: HOSPITALIST
Payer: COMMERCIAL

## 2017-10-01 ENCOUNTER — HOSPITAL ENCOUNTER (OUTPATIENT)
Facility: HOSPITAL | Age: 47
Setting detail: OBSERVATION
Discharge: HOME | End: 2017-10-02
Attending: EMERGENCY MEDICINE | Admitting: HOSPITALIST
Payer: COMMERCIAL

## 2017-10-01 ENCOUNTER — APPOINTMENT (OUTPATIENT)
Dept: CT IMAGING | Facility: HOSPITAL | Age: 47
End: 2017-10-01
Attending: EMERGENCY MEDICINE
Payer: COMMERCIAL

## 2017-10-01 ENCOUNTER — APPOINTMENT (OUTPATIENT)
Dept: GENERAL RADIOLOGY | Facility: HOSPITAL | Age: 47
End: 2017-10-01
Attending: EMERGENCY MEDICINE
Payer: COMMERCIAL

## 2017-10-01 DIAGNOSIS — R42 VERTIGO: ICD-10-CM

## 2017-10-01 DIAGNOSIS — R06.00 DYSPNEA ON EXERTION: ICD-10-CM

## 2017-10-01 DIAGNOSIS — R26.9 ABNORMALITY OF GAIT: Primary | ICD-10-CM

## 2017-10-01 PROBLEM — D64.9 ANEMIA: Status: ACTIVE | Noted: 2017-10-01

## 2017-10-01 PROBLEM — R06.09 DYSPNEA ON EXERTION: Status: ACTIVE | Noted: 2017-10-01

## 2017-10-01 PROBLEM — R79.89 AZOTEMIA: Status: ACTIVE | Noted: 2017-10-01

## 2017-10-01 PROCEDURE — 71010 XR CHEST AP PORTABLE  (CPT=71010): CPT | Performed by: EMERGENCY MEDICINE

## 2017-10-01 PROCEDURE — 71275 CT ANGIOGRAPHY CHEST: CPT | Performed by: HOSPITALIST

## 2017-10-01 PROCEDURE — 70450 CT HEAD/BRAIN W/O DYE: CPT | Performed by: EMERGENCY MEDICINE

## 2017-10-01 PROCEDURE — 99220 INITIAL OBSERVATION CARE,LEVL III: CPT | Performed by: HOSPITALIST

## 2017-10-01 RX ORDER — SODIUM CHLORIDE 9 MG/ML
INJECTION, SOLUTION INTRAVENOUS CONTINUOUS
Status: ACTIVE | OUTPATIENT
Start: 2017-10-01 | End: 2017-10-01

## 2017-10-01 RX ORDER — MECLIZINE HCL 12.5 MG/1
12.5 TABLET ORAL 3 TIMES DAILY PRN
Status: DISCONTINUED | OUTPATIENT
Start: 2017-10-01 | End: 2017-10-03

## 2017-10-01 RX ORDER — POTASSIUM CHLORIDE 20 MEQ/1
40 TABLET, EXTENDED RELEASE ORAL ONCE
Status: COMPLETED | OUTPATIENT
Start: 2017-10-01 | End: 2017-10-01

## 2017-10-01 RX ORDER — ONDANSETRON 2 MG/ML
4 INJECTION INTRAMUSCULAR; INTRAVENOUS EVERY 6 HOURS PRN
Status: DISCONTINUED | OUTPATIENT
Start: 2017-10-01 | End: 2017-10-03

## 2017-10-01 RX ORDER — MECLIZINE HYDROCHLORIDE 25 MG/1
25 TABLET ORAL ONCE
Status: COMPLETED | OUTPATIENT
Start: 2017-10-01 | End: 2017-10-01

## 2017-10-01 RX ORDER — ACETAMINOPHEN 325 MG/1
650 TABLET ORAL EVERY 6 HOURS PRN
Status: DISCONTINUED | OUTPATIENT
Start: 2017-10-01 | End: 2017-10-03

## 2017-10-01 RX ORDER — AMLODIPINE BESYLATE 5 MG/1
5 TABLET ORAL
Status: DISCONTINUED | OUTPATIENT
Start: 2017-10-01 | End: 2017-10-03

## 2017-10-01 RX ORDER — ONDANSETRON 2 MG/ML
4 INJECTION INTRAMUSCULAR; INTRAVENOUS EVERY 4 HOURS PRN
Status: DISCONTINUED | OUTPATIENT
Start: 2017-10-01 | End: 2017-10-03

## 2017-10-01 RX ORDER — HYDROMORPHONE HYDROCHLORIDE 1 MG/ML
0.5 INJECTION, SOLUTION INTRAMUSCULAR; INTRAVENOUS; SUBCUTANEOUS EVERY 30 MIN PRN
Status: ACTIVE | OUTPATIENT
Start: 2017-10-01 | End: 2017-10-01

## 2017-10-01 RX ORDER — ENOXAPARIN SODIUM 100 MG/ML
40 INJECTION SUBCUTANEOUS DAILY
Status: DISCONTINUED | OUTPATIENT
Start: 2017-10-01 | End: 2017-10-03

## 2017-10-01 NOTE — ED NOTES
Report given to [facility], Dr. Paloma Clifford going in to speak to patient and see if she will be able to walk

## 2017-10-01 NOTE — ED NOTES
Attempted to walk patient to the restroom, but she was unable to walk or stand without feeling like she was going to fall. Pt is a/o x 3,  is at the bedside. Will inform md that I tried to walk her around and she was unable to do it.

## 2017-10-01 NOTE — H&P
[facility]OSPITAL[facility]  History and Physical     Jo Ann Santana Patient Status:  Emergency    2/3/1970 MRN [id number]   Location [address] [facility] Attending Dilip Barker MD   Hosp Day # 0 PCP Ludwig Pritchett MD     Chief Complaint: Rash  Neosporin Af [[place]icon*        Comment:CRREA-blisters    Medications:    No current facility-administered medications on file prior to encounter.    Current Outpatient Prescriptions on File Prior to Encounter:  PEG 3350-KCl-Na Bicarb-NaCl (TRILYTE) 420 g CO2  28.0   ALKPHO  93   AST  48*   ALT  34   BILT  0.7   TP  6.6       Estimated Creatinine Clearance: 111.7 mL/min (based on SCr of 0.48 mg/dL (L)). No results for input(s): PTP, INR in the last 72 hours.     Recent Labs   Lab  10/01/17   1228   TROP

## 2017-10-01 NOTE — ED NOTES
Medicated patient with antivert po and will continue to monitor, went over results of CT scan and her labs, will continue to monitor patient and try to walk her after the medication has a chance to work.

## 2017-10-01 NOTE — ED INITIAL ASSESSMENT (HPI)
Pt went to [facility] immediate care in [place] and was told to come to the ER for further evaluation. States she is having a hard time walking without shaking and not being able to stand normally.   Pt is also sob and states that she keeps dropping amelia

## 2017-10-01 NOTE — PROGRESS NOTES
Pt admitted from ER   A & o x 4 complaints of headache 5/10  vss on room air nsr on tele   Orientated to room and call light   Will follow through with orders     D-dimer 1.26 Dr Annette Zapata notified

## 2017-10-02 ENCOUNTER — APPOINTMENT (OUTPATIENT)
Dept: MRI IMAGING | Facility: HOSPITAL | Age: 47
End: 2017-10-02
Attending: HOSPITALIST
Payer: COMMERCIAL

## 2017-10-02 VITALS
HEIGHT: 61 IN | TEMPERATURE: 98 F | RESPIRATION RATE: 18 BRPM | OXYGEN SATURATION: 96 % | WEIGHT: 255.75 LBS | HEART RATE: 95 BPM | SYSTOLIC BLOOD PRESSURE: 156 MMHG | BODY MASS INDEX: 48.29 KG/M2 | DIASTOLIC BLOOD PRESSURE: 72 MMHG

## 2017-10-02 PROCEDURE — 70553 MRI BRAIN STEM W/O & W/DYE: CPT | Performed by: HOSPITALIST

## 2017-10-02 PROCEDURE — 99217 OBSERVATION CARE DISCHARGE: CPT | Performed by: HOSPITALIST

## 2017-10-02 RX ORDER — MECLIZINE HCL 12.5 MG/1
12.5 TABLET ORAL 3 TIMES DAILY PRN
Qty: 30 TABLET | Refills: 0 | Status: SHIPPED | OUTPATIENT
Start: 2017-10-02 | End: 2017-10-23 | Stop reason: ALTCHOICE

## 2017-10-02 RX ORDER — POTASSIUM CHLORIDE 20 MEQ/1
40 TABLET, EXTENDED RELEASE ORAL ONCE
Status: COMPLETED | OUTPATIENT
Start: 2017-10-02 | End: 2017-10-02

## 2017-10-02 NOTE — PLAN OF CARE
Assumed care at 0730. Very mild sob with moderate exertion. Awaiting MRI.  1800: C/o HA, tylenol given. Says \"feels like the left side of my face is sliding off. \" Neurologically intact. Occasional R hand tremor. Needs attended to.     Altered Communica

## 2017-10-02 NOTE — PROGRESS NOTES
MOLLY HOSPITALIST  Progress Note     Immanuel Pelletier Patient Status:  Observation    2/3/1970 MRN [id number]   [facility] 7NE-A Attending Sumeet Zepeda, [facility] Day # 0 PCP Tobias Morel MD     Chief Complaint: Gait instability     S: maxillary sinus opacification  1. MRI brain ordered for further evaluation  2. Negative orthostatics   3. No therapy needs per PT - gait stable   2. Dyspnea  1. Saturating well on room air  2. CTA chest negative for PE  3. Essential HTN  1.  Continue home m

## 2017-10-02 NOTE — PLAN OF CARE
Degree of dizziness while ambulating has diminished but not resolved. Orthostatic BP shown below. Denies pain. ST low 100s. K replaced.        10/01/17 1942 10/01/17 1945 10/01/17 1950   Vital Signs   Pulse 106 110 107   Heart Rate Source Monitor --

## 2017-10-02 NOTE — OCCUPATIONAL THERAPY NOTE
OCCUPATIONAL THERAPY QUICK EVALUATION - INPATIENT    Room Number: 9145/5488-H  Evaluation Date: 10/2/2017     Type of Evaluation: Quick Eval  Presenting Problem: dizziness, vertigo    Physician Order: IP Consult to Occupational Therapy  Reason for Therapy: Occupation/Status:   Hand Dominance: Right  Drives: Yes  Patient Regularly Uses: Reading glasses    Prior Level of Function: independent with ADL, IADL. Pt lives with , 3 children, 2 dogs, and a cat.   Works as a  addressed    ASSESSMENT     Patient is a 41year old female admitted on 10/1/2017 for dizziness, vertigo. Complete medical history and occupational profile noted above. Pt is able to perform ADL safely and independently.       Patient Complexity  Occupation

## 2017-10-02 NOTE — DISCHARGE SUMMARY
[facility] HOSPITALIST  DISCHARGE SUMMARY     Rosetta Burger Patient Status:  Observation    2/3/1970 MRN [id number]   [facility] 7NE-A Attending No att. providers found   Hosp Day # 0 PCP Emily Weems MD     Date of Admission: 10/1/2017  D vital signs negative. Symptoms improved with PRN meclizine. Patient discharged home in good condition.      Procedures during hospitalization:   • None    Incidental or significant findings and recommendations (brief descriptions):  • None    Lab/Test resul

## 2017-10-02 NOTE — PAYOR COMM NOTE
--------------  ADMISSION REVIEW     Payor: [facility] PPO  Subscriber #:  [id number]  Authorization Number: N/A    Admit date: N/A  Admit time: N/A       Admitting Physician: Jenny Jara DO  Attending Physician:  Jenny Jara DO  Primary Care Physician: ABLATION  No date: HERNIA SURGERY      Comment: inguinal hernai repair  No date: HYSTERECTOMY  10/2006: HYSTEROSCOPY,WITH ENDOMETRIAL  No date: OOPHORECTOMY  2107: TOTAL ABDOMINAL HYSTERECTOMY  9/2004: TUBAL LIGATION      Review of Systems    Positive for No dysdiadochokinesia. Bilateral pitting edema noted  Gait is unsteady and she appears if she is going to follow walking.     ED Course[DW.1]     Labs Reviewed   COMP METABOLIC PANEL (14) - Abnormal; Notable for the following:        Result Value    Creati and light sensitiity. Hx HTN         FINDINGS: Brain volume is normal. No bleed or mass effect is identified. There is no ventriculomegaly or extradural collection. Basal cisterns are within normal limits. There is no Chiari. There is an empty sella.  This Troponin was normal.  BNP of 490. CBC hemoglobin 11.9.   Creatinine of 0.4 AST 48 albumin 3.0 the rest of the metabolic panel CBC were normal.  The patient's gait being unsteady even after receiving meclizine she will need hospitalization due to the risk o exertion. Denies cough or wheezing reports she recently had a \"cold\" about a month ago. Patient also admits to recent long car ride to [place] from [place]. Denies chest pain or palpitations. No other complaints at this time. [NG.2]     Past Medical History:  Pa 3. [NG. 1] Morbidly obese. [NG.2]   HEENT: Normocephalic atraumatic. Moist mucous membranes. EOM-I. PERRLA. Neck: No JVD. No carotid bruits. Respiratory: Clear to auscultation bilaterally. No wheezes. No rhonchi.   Cardiovascular: S1, S2. Regular rate and r MEDICATIONS ADMINISTERED IN LAST 1 DAY:  Enoxaparin Sodium (LOVENOX) 40 MG/0.4ML injection 40 mg     Date Action Dose Route User    10/1/2017 2032 Given 40 mg Subcutaneous (Left Lower Abdomen) Wilver Perez RN            Meclizine HCl (ANTI

## 2017-10-02 NOTE — PHYSICAL THERAPY NOTE
PHYSICAL THERAPY QUICK EVALUATION - INPATIENT    Room Number: 1376/5648-K  Evaluation Date: 10/2/2017  Presenting Problem: vertigo  Physician Order: PT Eval and Treat    Problem List  Principal Problem:    Abnormality of gait  Active Problems:    Anemia wheelchair, bedside commode, etc.): None   -   Moving from lying on back to sitting on the side of the bed?: None   How much help from another person does the patient currently need. ..   -   Moving to and from a bed to a chair (including a wheelchair)?: No ambulate on level surfaces At previous, functional level  Safely and independently

## 2017-10-03 NOTE — PLAN OF CARE
DC instructions reviewed with patient. No scripts on chart. [facility] She is aware of her follow up appt, Angy FAULKNER. IV discontinued. Escorted to main entrance via wc with AUDREY Munoz.

## 2017-10-06 ENCOUNTER — CHARTING TRANS (OUTPATIENT)
Dept: OTHER | Age: 47
End: 2017-10-06

## 2017-10-09 ENCOUNTER — OFFICE VISIT (OUTPATIENT)
Dept: INTERNAL MEDICINE CLINIC | Facility: CLINIC | Age: 47
End: 2017-10-09

## 2017-10-09 VITALS
RESPIRATION RATE: 16 BRPM | BODY MASS INDEX: 45.88 KG/M2 | TEMPERATURE: 98 F | SYSTOLIC BLOOD PRESSURE: 118 MMHG | HEIGHT: 61 IN | DIASTOLIC BLOOD PRESSURE: 64 MMHG | HEART RATE: 72 BPM | WEIGHT: 243 LBS

## 2017-10-09 DIAGNOSIS — R19.7 DIARRHEA, UNSPECIFIED TYPE: ICD-10-CM

## 2017-10-09 DIAGNOSIS — R42 VERTIGO: ICD-10-CM

## 2017-10-09 DIAGNOSIS — I10 ESSENTIAL HYPERTENSION: ICD-10-CM

## 2017-10-09 DIAGNOSIS — J34.1 NASAL SINUS CYST: Primary | ICD-10-CM

## 2017-10-09 DIAGNOSIS — H57.89 EYE DISCHARGE: ICD-10-CM

## 2017-10-09 DIAGNOSIS — J32.9 SINUSITIS, UNSPECIFIED CHRONICITY, UNSPECIFIED LOCATION: ICD-10-CM

## 2017-10-09 DIAGNOSIS — R63.4 WEIGHT LOSS: ICD-10-CM

## 2017-10-09 PROBLEM — K62.5 RECTAL BLEEDING: Status: RESOLVED | Noted: 2017-03-29 | Resolved: 2017-10-09

## 2017-10-09 PROBLEM — R26.9 ABNORMALITY OF GAIT: Status: RESOLVED | Noted: 2017-10-01 | Resolved: 2017-10-09

## 2017-10-09 PROBLEM — R06.09 DYSPNEA ON EXERTION: Status: RESOLVED | Noted: 2017-10-01 | Resolved: 2017-10-09

## 2017-10-09 PROBLEM — D64.9 ANEMIA: Status: RESOLVED | Noted: 2017-10-01 | Resolved: 2017-10-09

## 2017-10-09 PROBLEM — R79.89 AZOTEMIA: Status: RESOLVED | Noted: 2017-10-01 | Resolved: 2017-10-09

## 2017-10-09 PROBLEM — R06.00 DYSPNEA ON EXERTION: Status: RESOLVED | Noted: 2017-10-01 | Resolved: 2017-10-09

## 2017-10-09 PROCEDURE — 99214 OFFICE O/P EST MOD 30 MIN: CPT | Performed by: NURSE PRACTITIONER

## 2017-10-09 RX ORDER — FLUTICASONE PROPIONATE 50 MCG
1 SPRAY, SUSPENSION (ML) NASAL 2 TIMES DAILY
Qty: 1 BOTTLE | Refills: 1 | Status: SHIPPED | OUTPATIENT
Start: 2017-10-09 | End: 2018-03-30

## 2017-10-09 RX ORDER — AMOXICILLIN AND CLAVULANATE POTASSIUM 875; 125 MG/1; MG/1
1 TABLET, FILM COATED ORAL EVERY 12 HOURS
Refills: 0 | COMMUNITY
Start: 2017-10-06 | End: 2017-10-23 | Stop reason: ALTCHOICE

## 2017-10-09 RX ORDER — POLYMYXIN B SULFATE AND TRIMETHOPRIM 1; 10000 MG/ML; [USP'U]/ML
SOLUTION OPHTHALMIC
Refills: 0 | COMMUNITY
Start: 2017-10-06 | End: 2017-10-23 | Stop reason: ALTCHOICE

## 2017-10-09 NOTE — PROGRESS NOTES
Estefany Oneill is a 41year old female.   Patient presents with:  Hospital F/U: per patient did not get meds at hospital for vertigo states it is good now, however went to walk in for meds to take for pink eye and sinus infection as well ROOM 10       HPI: tablet Rfl: 0   PEG 3350-KCl-Na Bicarb-NaCl (TRILYTE) 420 g Oral Recon Soln Starting at 4:00 pm the night before procedure, drink 8 ounces of the prep every 15-20 minutes until finished Disp: 1 Bottle Rfl: 0   multivitamin Oral Tab Take 1 tablet by mouth d supple,no adenopathy,  LUNGS: normal rate without respiratory distress, lungs clear to auscultation  CARDIO: RRR without murmur  GI: normal bowel sounds, no masses, HSM or tenderness  Soft.    EXTREMITIES: no edema, normal strength and tone  PSYCH: alert an

## 2017-10-12 ENCOUNTER — LAB ENCOUNTER (OUTPATIENT)
Dept: LAB | Facility: HOSPITAL | Age: 47
End: 2017-10-12
Attending: NURSE PRACTITIONER
Payer: COMMERCIAL

## 2017-10-12 DIAGNOSIS — R19.7 DIARRHEA, UNSPECIFIED TYPE: ICD-10-CM

## 2017-10-12 PROCEDURE — 87493 C DIFF AMPLIFIED PROBE: CPT

## 2017-10-12 PROCEDURE — 89055 LEUKOCYTE ASSESSMENT FECAL: CPT

## 2017-10-12 PROCEDURE — 87209 SMEAR COMPLEX STAIN: CPT

## 2017-10-12 PROCEDURE — 87427 SHIGA-LIKE TOXIN AG IA: CPT

## 2017-10-12 PROCEDURE — 87046 STOOL CULTR AEROBIC BACT EA: CPT

## 2017-10-12 PROCEDURE — 87272 CRYPTOSPORIDIUM AG IF: CPT

## 2017-10-12 PROCEDURE — 87077 CULTURE AEROBIC IDENTIFY: CPT

## 2017-10-12 PROCEDURE — 87177 OVA AND PARASITES SMEARS: CPT

## 2017-10-12 PROCEDURE — 87045 FECES CULTURE AEROBIC BACT: CPT

## 2017-10-12 PROCEDURE — 87329 GIARDIA AG IA: CPT

## 2017-10-16 ENCOUNTER — TELEPHONE (OUTPATIENT)
Dept: INTERNAL MEDICINE CLINIC | Facility: CLINIC | Age: 47
End: 2017-10-16

## 2017-10-16 NOTE — TELEPHONE ENCOUNTER
Please call pt back to let her know we will need the Eye MD's ov notes for SD review to know what kind of testing the Eye MD is recommending. Please fax to attention Triage. Thank you.

## 2017-10-16 NOTE — TELEPHONE ENCOUNTER
Pt saw SD on 10/9/17 was told to see an Ophthalmologist the one she recommended wasn't available right away she saw Dr Adrian Ca (not within [address]) on Friday.  She was told SD will need to other further testing for infectious and immunological. Please adv

## 2017-10-23 ENCOUNTER — OFFICE VISIT (OUTPATIENT)
Dept: INTERNAL MEDICINE CLINIC | Facility: CLINIC | Age: 47
End: 2017-10-23

## 2017-10-23 VITALS
BODY MASS INDEX: 46.82 KG/M2 | HEIGHT: 61 IN | TEMPERATURE: 99 F | WEIGHT: 248 LBS | SYSTOLIC BLOOD PRESSURE: 132 MMHG | DIASTOLIC BLOOD PRESSURE: 64 MMHG | HEART RATE: 108 BPM

## 2017-10-23 DIAGNOSIS — H57.9 EYE EXAM ABNORMAL: ICD-10-CM

## 2017-10-23 DIAGNOSIS — G25.2 FINE TREMOR: Primary | ICD-10-CM

## 2017-10-23 DIAGNOSIS — J34.89 SINUS PRESSURE: ICD-10-CM

## 2017-10-23 DIAGNOSIS — I10 ESSENTIAL HYPERTENSION: ICD-10-CM

## 2017-10-23 DIAGNOSIS — R19.7 DIARRHEA, UNSPECIFIED TYPE: ICD-10-CM

## 2017-10-23 PROCEDURE — 99214 OFFICE O/P EST MOD 30 MIN: CPT | Performed by: NURSE PRACTITIONER

## 2017-10-23 NOTE — PROGRESS NOTES
Pricilla Evans is a 41year old female. Patient presents with: Follow - Up: Room 11. She states she feels worse. She is having surgery on Friday for her sinus       HPI:   Here for f/u weight loss. Her weight is back up.      Having sinus surgery on Fri adult [facility])    • PONV (postoperative nausea and vomiting)    • Unspecified essential hypertension 6/29/2012   • Visual impairment     contacts and glasses      Social History:  Smoking status: Never Smoker [E]      Rheumatoid Arthritis Factor      Uric Acid, Serum      Cyclic Citrullinate Peptide (CCP) antibodies      Sed Rate, Westergren (Automated)      C-Reactive Protein      [place], Direct, with Reflex To 9 antibodies (Edmadalyn/[employer])    Meds & Refills for th

## 2017-10-25 ENCOUNTER — LAB ENCOUNTER (OUTPATIENT)
Dept: LAB | Age: 47
End: 2017-10-25
Attending: NURSE PRACTITIONER
Payer: COMMERCIAL

## 2017-10-25 DIAGNOSIS — H57.9 EYE EXAM ABNORMAL: ICD-10-CM

## 2017-10-25 DIAGNOSIS — I10 ESSENTIAL HYPERTENSION: ICD-10-CM

## 2017-10-25 DIAGNOSIS — R79.89 ELEVATED LFTS: Primary | ICD-10-CM

## 2017-10-25 DIAGNOSIS — G25.2 FINE TREMOR: ICD-10-CM

## 2017-10-25 PROCEDURE — 84550 ASSAY OF BLOOD/URIC ACID: CPT | Performed by: NURSE PRACTITIONER

## 2017-10-25 PROCEDURE — 84439 ASSAY OF FREE THYROXINE: CPT | Performed by: NURSE PRACTITIONER

## 2017-10-25 PROCEDURE — 82306 VITAMIN D 25 HYDROXY: CPT | Performed by: NURSE PRACTITIONER

## 2017-10-25 PROCEDURE — 84443 ASSAY THYROID STIM HORMONE: CPT | Performed by: NURSE PRACTITIONER

## 2017-10-25 PROCEDURE — 80053 COMPREHEN METABOLIC PANEL: CPT | Performed by: NURSE PRACTITIONER

## 2017-10-25 PROCEDURE — 86038 ANTINUCLEAR ANTIBODIES: CPT | Performed by: NURSE PRACTITIONER

## 2017-10-25 PROCEDURE — 86200 CCP ANTIBODY: CPT | Performed by: NURSE PRACTITIONER

## 2017-10-25 PROCEDURE — 86431 RHEUMATOID FACTOR QUANT: CPT | Performed by: NURSE PRACTITIONER

## 2017-10-25 PROCEDURE — 85652 RBC SED RATE AUTOMATED: CPT | Performed by: NURSE PRACTITIONER

## 2017-10-25 PROCEDURE — 82607 VITAMIN B-12: CPT | Performed by: NURSE PRACTITIONER

## 2017-10-25 PROCEDURE — 36415 COLL VENOUS BLD VENIPUNCTURE: CPT | Performed by: NURSE PRACTITIONER

## 2017-10-25 PROCEDURE — 86140 C-REACTIVE PROTEIN: CPT | Performed by: NURSE PRACTITIONER

## 2017-10-26 ENCOUNTER — TELEPHONE (OUTPATIENT)
Dept: INTERNAL MEDICINE CLINIC | Facility: CLINIC | Age: 47
End: 2017-10-26

## 2017-10-26 PROBLEM — R79.89 ABNORMAL LIVER FUNCTION TESTS: Status: ACTIVE | Noted: 2017-10-26

## 2017-10-26 PROBLEM — M62.81 PROXIMAL MUSCLE WEAKNESS: Status: ACTIVE | Noted: 2017-10-26

## 2017-10-27 ENCOUNTER — HOSPITAL ENCOUNTER (OUTPATIENT)
Dept: ULTRASOUND IMAGING | Age: 47
Discharge: HOME | End: 2017-10-27
Attending: NURSE PRACTITIONER
Payer: COMMERCIAL

## 2017-10-27 DIAGNOSIS — R79.89 ELEVATED LFTS: ICD-10-CM

## 2017-10-27 PROCEDURE — 76700 US EXAM ABDOM COMPLETE: CPT | Performed by: NURSE PRACTITIONER

## 2017-10-27 NOTE — TELEPHONE ENCOUNTER
Pt is wondering does she keep todays appt? Did SD speak to endo?  Please call ASAP she is scheduled to come in Future Appointments  Date Time Provider Glenda Rivas   10/27/2017 11:15 AM LUCIE Styles [facility]   10/27/2017 1:15 PM PF

## 2017-10-30 NOTE — TELEPHONE ENCOUNTER
S/w Fitz Cifuentes from [address] was faxed on 10/17/17. No paperwork noted, she will refax. Awaiting consult notes.  Report received placed in SD folder for review

## 2017-11-06 ENCOUNTER — HOSPITAL ENCOUNTER (OUTPATIENT)
Age: 47
Discharge: HOME | End: 2017-11-06
Payer: COMMERCIAL

## 2017-11-06 VITALS
RESPIRATION RATE: 20 BRPM | TEMPERATURE: 99 F | OXYGEN SATURATION: 96 % | HEART RATE: 99 BPM | DIASTOLIC BLOOD PRESSURE: 68 MMHG | SYSTOLIC BLOOD PRESSURE: 137 MMHG

## 2017-11-06 DIAGNOSIS — J01.40 ACUTE NON-RECURRENT PANSINUSITIS: Primary | ICD-10-CM

## 2017-11-06 DIAGNOSIS — J03.90 ACUTE TONSILLITIS, UNSPECIFIED ETIOLOGY: ICD-10-CM

## 2017-11-06 PROCEDURE — 99204 OFFICE O/P NEW MOD 45 MIN: CPT

## 2017-11-06 PROCEDURE — 99213 OFFICE O/P EST LOW 20 MIN: CPT

## 2017-11-06 RX ORDER — AMOXICILLIN AND CLAVULANATE POTASSIUM 875; 125 MG/1; MG/1
1 TABLET, FILM COATED ORAL 2 TIMES DAILY
Qty: 20 TABLET | Refills: 0 | Status: SHIPPED | OUTPATIENT
Start: 2017-11-06 | End: 2017-11-16

## 2017-11-06 NOTE — ED PROVIDER NOTES
Patient Seen in: [facility]    History   Patient presents with:  Cough/URI  Ear Problem Pain (neurosensory)  Sinus Problem    Stated Complaint: sore throat ear pain ha sinus pressure/dehydration    HPI    Patient is a 47-year-ol Triage Vitals [11/06/17 0919]  BP: 137/68  Pulse: 99  Resp: 20  Temp: 98.7 °F (37.1 °C)  Temp src: Temporal  SpO2: 96 %  O2 Device: None (Room air)    Current:/68   Pulse 99   Temp 98.7 °F (37.1 °C) (Temporal)   Resp 20   LMP  (Approximate)   SpO2 96 diagnosis)  Acute tonsillitis, unspecified etiology    Disposition:  Discharge    Follow-up:  Lexi Kaufman 35  [address] [address]  [phone]            Medications Prescribed:  Current Discharge Medication List    ST

## 2017-11-06 NOTE — ED INITIAL ASSESSMENT (HPI)
Pt c/o nasal congestion, post nasal drip, right ear pain, right facial - sinus pain and cough since Saturday.   (Dx with Graves disease 2 weeks ago.)

## 2017-11-07 RX ORDER — CEFDINIR 300 MG/1
300 CAPSULE ORAL 2 TIMES DAILY
Qty: 20 CAPSULE | Refills: 0 | Status: SHIPPED | OUTPATIENT
Start: 2017-11-07 | End: 2017-11-17

## 2017-11-07 RX ORDER — ONDANSETRON 4 MG/1
4 TABLET, ORALLY DISINTEGRATING ORAL EVERY 4 HOURS PRN
Qty: 10 TABLET | Refills: 0 | Status: SHIPPED | OUTPATIENT
Start: 2017-11-07 | End: 2017-11-14

## 2017-11-08 NOTE — PROGRESS NOTES
Patient called the immediate care at [phone]; patient states that the Augmentin is causing her moderate upset stomach. She is vomited multiple times after attempting to take this medication. She is requesting that the medication be changed.   I am willing to

## 2017-11-21 ENCOUNTER — APPOINTMENT (OUTPATIENT)
Dept: LAB | Age: 47
End: 2017-11-21
Attending: INTERNAL MEDICINE
Payer: COMMERCIAL

## 2017-11-21 DIAGNOSIS — R79.89 ELEVATED LFTS: ICD-10-CM

## 2017-11-21 DIAGNOSIS — M62.81 PROXIMAL MUSCLE WEAKNESS: ICD-10-CM

## 2017-11-21 DIAGNOSIS — E05.90 HYPERTHYROIDISM: ICD-10-CM

## 2017-11-21 DIAGNOSIS — I10 ESSENTIAL HYPERTENSION: ICD-10-CM

## 2017-11-21 DIAGNOSIS — R79.89 ABNORMAL LIVER FUNCTION TESTS: ICD-10-CM

## 2017-11-21 PROCEDURE — 80074 ACUTE HEPATITIS PANEL: CPT | Performed by: NURSE PRACTITIONER

## 2017-12-15 PROCEDURE — 88304 TISSUE EXAM BY PATHOLOGIST: CPT

## 2017-12-15 PROCEDURE — 88311 DECALCIFY TISSUE: CPT

## 2017-12-16 ENCOUNTER — LABORATORY ENCOUNTER (OUTPATIENT)
Dept: LAB | Age: 47
End: 2017-12-16
Attending: OTOLARYNGOLOGY
Payer: COMMERCIAL

## 2017-12-16 DIAGNOSIS — J32.4 PANSINUSITIS: Primary | ICD-10-CM

## 2017-12-27 RX ORDER — AMLODIPINE BESYLATE 5 MG/1
TABLET ORAL
Qty: 90 TABLET | Refills: 0 | Status: SHIPPED | OUTPATIENT
Start: 2017-12-27 | End: 2018-03-30

## 2017-12-27 RX ORDER — LABETALOL 300 MG/1
TABLET, FILM COATED ORAL
Qty: 90 TABLET | Refills: 0 | Status: SHIPPED | OUTPATIENT
Start: 2017-12-27 | End: 2018-03-30

## 2017-12-28 ENCOUNTER — MED REC SCAN ONLY (OUTPATIENT)
Dept: INTERNAL MEDICINE CLINIC | Facility: CLINIC | Age: 47
End: 2017-12-28

## 2018-01-20 ENCOUNTER — APPOINTMENT (OUTPATIENT)
Dept: LAB | Age: 48
End: 2018-01-20
Attending: INTERNAL MEDICINE
Payer: COMMERCIAL

## 2018-01-20 DIAGNOSIS — E05.00 GRAVES DISEASE: ICD-10-CM

## 2018-01-20 DIAGNOSIS — R79.89 ABNORMAL LFTS: ICD-10-CM

## 2018-01-20 LAB
ALBUMIN SERPL-MCNC: 3.7 G/DL (ref 3.5–4.8)
ALP LIVER SERPL-CCNC: 132 U/L (ref 37–98)
ALT SERPL-CCNC: 13 U/L (ref 14–54)
AST SERPL-CCNC: 19 U/L (ref 15–41)
BILIRUB SERPL-MCNC: 0.8 MG/DL (ref 0.1–2)
BUN BLD-MCNC: 13 MG/DL (ref 8–20)
CALCIUM BLD-MCNC: 8.8 MG/DL (ref 8.3–10.3)
CHLORIDE: 105 MMOL/L (ref 101–111)
CO2: 30 MMOL/L (ref 22–32)
CREAT BLD-MCNC: 0.85 MG/DL (ref 0.55–1.02)
FREE T4: 0.2 NG/DL (ref 0.9–1.8)
GLUCOSE BLD-MCNC: 95 MG/DL (ref 70–99)
M PROTEIN MFR SERPL ELPH: 7.9 G/DL (ref 6.1–8.3)
POTASSIUM SERPL-SCNC: 3.9 MMOL/L (ref 3.6–5.1)
SODIUM SERPL-SCNC: 140 MMOL/L (ref 136–144)
T3FREE SERPL-MCNC: 1.03 PG/ML (ref 2.3–4.2)
TSI SER-ACNC: 28.8 MIU/ML (ref 0.35–5.5)

## 2018-01-20 PROCEDURE — 84481 FREE ASSAY (FT-3): CPT

## 2018-01-20 PROCEDURE — 80053 COMPREHEN METABOLIC PANEL: CPT

## 2018-01-20 PROCEDURE — 36415 COLL VENOUS BLD VENIPUNCTURE: CPT

## 2018-01-20 PROCEDURE — 84439 ASSAY OF FREE THYROXINE: CPT

## 2018-01-20 PROCEDURE — 84443 ASSAY THYROID STIM HORMONE: CPT

## 2018-01-21 PROBLEM — E05.90 HYPERTHYROIDISM: Status: ACTIVE | Noted: 2018-01-21

## 2018-02-17 ENCOUNTER — LABORATORY ENCOUNTER (OUTPATIENT)
Dept: LAB | Age: 48
End: 2018-02-17
Attending: INTERNAL MEDICINE
Payer: COMMERCIAL

## 2018-02-17 DIAGNOSIS — I10 ESSENTIAL HYPERTENSION: ICD-10-CM

## 2018-02-17 DIAGNOSIS — E05.90 HYPERTHYROIDISM: ICD-10-CM

## 2018-02-17 DIAGNOSIS — R79.89 ABNORMAL LIVER FUNCTION TESTS: ICD-10-CM

## 2018-02-17 LAB
FREE T4: 0.5 NG/DL (ref 0.9–1.8)
T3FREE SERPL-MCNC: 2.44 PG/ML (ref 2.3–4.2)
TSI SER-ACNC: 12.7 MIU/ML (ref 0.35–5.5)

## 2018-02-17 PROCEDURE — 84443 ASSAY THYROID STIM HORMONE: CPT

## 2018-02-17 PROCEDURE — 84439 ASSAY OF FREE THYROXINE: CPT

## 2018-02-17 PROCEDURE — 84481 FREE ASSAY (FT-3): CPT

## 2018-02-17 PROCEDURE — 36415 COLL VENOUS BLD VENIPUNCTURE: CPT

## 2018-02-19 NOTE — PROGRESS NOTES
[phone] (home) [phone] (work)  Telephone Information:  Mobile          [phone]  Pt states she is on methimazole 10mg BID as she was having the shakes. Please advise, thank you.

## 2018-02-22 NOTE — PROGRESS NOTES
Patient informed of Dr. Mike Kapadia result note. Patient verbalized understanding and agrees with plan.

## 2018-03-28 ENCOUNTER — LAB ENCOUNTER (OUTPATIENT)
Dept: LAB | Age: 48
End: 2018-03-28
Attending: INTERNAL MEDICINE
Payer: COMMERCIAL

## 2018-03-28 DIAGNOSIS — E05.90 HYPERTHYROIDISM: ICD-10-CM

## 2018-03-28 LAB
FREE T4: 0.7 NG/DL (ref 0.9–1.8)
TSI SER-ACNC: 7.54 MIU/ML (ref 0.35–5.5)

## 2018-03-28 PROCEDURE — 36415 COLL VENOUS BLD VENIPUNCTURE: CPT

## 2018-03-28 PROCEDURE — 84443 ASSAY THYROID STIM HORMONE: CPT

## 2018-03-28 PROCEDURE — 84439 ASSAY OF FREE THYROXINE: CPT

## 2018-03-30 ENCOUNTER — OFFICE VISIT (OUTPATIENT)
Dept: INTERNAL MEDICINE CLINIC | Facility: CLINIC | Age: 48
End: 2018-03-30

## 2018-03-30 VITALS
DIASTOLIC BLOOD PRESSURE: 76 MMHG | BODY MASS INDEX: 47.2 KG/M2 | HEIGHT: 61 IN | HEART RATE: 80 BPM | TEMPERATURE: 98 F | SYSTOLIC BLOOD PRESSURE: 126 MMHG | WEIGHT: 250 LBS | RESPIRATION RATE: 16 BRPM

## 2018-03-30 DIAGNOSIS — I10 ESSENTIAL HYPERTENSION: ICD-10-CM

## 2018-03-30 DIAGNOSIS — J34.1 NASAL SINUS CYST: ICD-10-CM

## 2018-03-30 DIAGNOSIS — R79.89 ABNORMAL LIVER FUNCTION TESTS: ICD-10-CM

## 2018-03-30 DIAGNOSIS — Z00.00 LABORATORY EXAMINATION ORDERED AS PART OF A COMPLETE PHYSICAL EXAMINATION: ICD-10-CM

## 2018-03-30 DIAGNOSIS — Z00.00 ROUTINE GENERAL MEDICAL EXAMINATION AT A HEALTH CARE FACILITY: Primary | ICD-10-CM

## 2018-03-30 DIAGNOSIS — E05.90 HYPERTHYROIDISM: ICD-10-CM

## 2018-03-30 DIAGNOSIS — M62.81 PROXIMAL MUSCLE WEAKNESS: ICD-10-CM

## 2018-03-30 DIAGNOSIS — Z12.31 ENCOUNTER FOR SCREENING MAMMOGRAM FOR MALIGNANT NEOPLASM OF BREAST: ICD-10-CM

## 2018-03-30 PROCEDURE — 99396 PREV VISIT EST AGE 40-64: CPT | Performed by: NURSE PRACTITIONER

## 2018-03-30 RX ORDER — METHIMAZOLE 10 MG/1
10 TABLET ORAL DAILY
COMMUNITY
End: 2018-07-16

## 2018-03-30 RX ORDER — AMLODIPINE BESYLATE 5 MG/1
5 TABLET ORAL
Qty: 90 TABLET | Refills: 3 | Status: SHIPPED | OUTPATIENT
Start: 2018-03-30 | End: 2019-04-08

## 2018-03-30 RX ORDER — LABETALOL 300 MG/1
300 TABLET, FILM COATED ORAL
Qty: 90 TABLET | Refills: 3 | Status: SHIPPED | OUTPATIENT
Start: 2018-03-30 | End: 2018-08-23 | Stop reason: ALTCHOICE

## 2018-03-30 NOTE — PROGRESS NOTES
Jaen Marie is a 57year old female who presents for a complete physical exam:       Patient complains of:       Essential hypertension   Her bp is well controlled. labetolol and amlodipine. Experiencing mild LE edema   Worse by end of day.  ? Relate nausea and vomiting)    • Unspecified essential hypertension 2012   • Visual impairment     contacts and glasses      Past Surgical History:  No date:       Comment: x3  No date: ENDOMETRIAL ABLATION  No date: HERNIA SURGERY      Comment: ing exertion  CARDIOVASCULAR: denies chest pain on exertion  GI: denies abdominal pain,denies heartburn  : denies dysuria, vaginal discharge or itching  MUSCULOSKELETAL: denies back pain  NEURO: denies headaches  PSYCH: no c/o      EXAM:   Resp 16   Ht 61\"

## 2018-06-02 ENCOUNTER — CHARTING TRANS (OUTPATIENT)
Dept: OTHER | Age: 48
End: 2018-06-02

## 2018-06-02 ASSESSMENT — PAIN SCALES - GENERAL: PAINLEVEL_OUTOF10: 5

## 2018-06-08 ENCOUNTER — OFFICE VISIT (OUTPATIENT)
Dept: INTERNAL MEDICINE CLINIC | Facility: CLINIC | Age: 48
End: 2018-06-08

## 2018-06-08 VITALS
DIASTOLIC BLOOD PRESSURE: 80 MMHG | OXYGEN SATURATION: 98 % | HEART RATE: 88 BPM | TEMPERATURE: 98 F | BODY MASS INDEX: 46.44 KG/M2 | RESPIRATION RATE: 16 BRPM | SYSTOLIC BLOOD PRESSURE: 128 MMHG | WEIGHT: 246 LBS | HEIGHT: 61 IN

## 2018-06-08 DIAGNOSIS — J01.80 ACUTE NON-RECURRENT SINUSITIS OF OTHER SINUS: Primary | ICD-10-CM

## 2018-06-08 PROCEDURE — 99213 OFFICE O/P EST LOW 20 MIN: CPT | Performed by: NURSE PRACTITIONER

## 2018-06-08 RX ORDER — AMOXICILLIN AND CLAVULANATE POTASSIUM 875; 125 MG/1; MG/1
TABLET, FILM COATED ORAL
Refills: 0 | COMMUNITY
Start: 2018-06-02 | End: 2018-07-26 | Stop reason: ALTCHOICE

## 2018-06-08 RX ORDER — DOXYCYCLINE HYCLATE 100 MG
100 TABLET ORAL 2 TIMES DAILY
Qty: 20 TABLET | Refills: 0 | Status: SHIPPED | OUTPATIENT
Start: 2018-06-08 | End: 2018-07-26 | Stop reason: ALTCHOICE

## 2018-06-08 RX ORDER — PREDNISONE 20 MG/1
20 TABLET ORAL DAILY
Qty: 5 TABLET | Refills: 0 | Status: SHIPPED | OUTPATIENT
Start: 2018-06-08 | End: 2018-06-18

## 2018-06-08 NOTE — PROGRESS NOTES
Patient presents with:  Sinus Problem: AB RM 7, patient states going to [facility] 06/02/2018 given medication, not doing any better if anything feeling worse       HPI:  Presents with approx 10 day history of sinus congestion, nasal drainage, cough with minimal p Exam  /80   Pulse 88   Temp 97.8 °F (36.6 °C) (Oral)   Resp 16   Ht 61\"   Wt 246 lb   LMP 01/23/2017   SpO2 98%   BMI 46.48 kg/m²   Constitutional: well developed, well nourished,in no apparent distress  HEENT: Normocephalic and atraumatic.  Tympanic

## 2018-06-14 ENCOUNTER — HOSPITAL ENCOUNTER (OUTPATIENT)
Dept: MAMMOGRAPHY | Age: 48
Discharge: HOME | End: 2018-06-14
Attending: NURSE PRACTITIONER
Payer: COMMERCIAL

## 2018-06-14 ENCOUNTER — LAB ENCOUNTER (OUTPATIENT)
Dept: LAB | Age: 48
End: 2018-06-14
Attending: NURSE PRACTITIONER
Payer: COMMERCIAL

## 2018-06-14 DIAGNOSIS — E05.90 HYPERTHYROIDISM: ICD-10-CM

## 2018-06-14 DIAGNOSIS — Z00.00 LABORATORY EXAMINATION ORDERED AS PART OF A COMPLETE PHYSICAL EXAMINATION: ICD-10-CM

## 2018-06-14 DIAGNOSIS — Z12.31 ENCOUNTER FOR SCREENING MAMMOGRAM FOR MALIGNANT NEOPLASM OF BREAST: ICD-10-CM

## 2018-06-14 DIAGNOSIS — I10 ESSENTIAL HYPERTENSION: ICD-10-CM

## 2018-06-14 PROCEDURE — 36415 COLL VENOUS BLD VENIPUNCTURE: CPT | Performed by: NURSE PRACTITIONER

## 2018-06-14 PROCEDURE — 77063 BREAST TOMOSYNTHESIS BI: CPT | Performed by: NURSE PRACTITIONER

## 2018-06-14 PROCEDURE — 80053 COMPREHEN METABOLIC PANEL: CPT | Performed by: NURSE PRACTITIONER

## 2018-06-14 PROCEDURE — 77067 SCR MAMMO BI INCL CAD: CPT | Performed by: NURSE PRACTITIONER

## 2018-06-14 PROCEDURE — 85025 COMPLETE CBC W/AUTO DIFF WBC: CPT | Performed by: NURSE PRACTITIONER

## 2018-06-18 NOTE — PROGRESS NOTES
Patient informed of instructions from Dr. Tana Frederick. Patient understands and agrees to plan of care.

## 2018-06-18 NOTE — PROGRESS NOTES
Pt returned call, she has not had any missed doses. She is on 5 mg Methimazole once daily. She is currently on antibiotics for a month. Was taking them when when had labs done.

## 2018-07-12 ENCOUNTER — APPOINTMENT (OUTPATIENT)
Dept: LAB | Age: 48
End: 2018-07-12
Attending: INTERNAL MEDICINE
Payer: COMMERCIAL

## 2018-07-12 DIAGNOSIS — E05.90 HYPERTHYROIDISM: ICD-10-CM

## 2018-07-12 LAB
FREE T4: 2.1 NG/DL (ref 0.9–1.8)
T3FREE SERPL-MCNC: 6.14 PG/ML (ref 2.3–4.2)
TSI SER-ACNC: <0.005 MIU/ML (ref 0.35–5.5)

## 2018-07-12 PROCEDURE — 84439 ASSAY OF FREE THYROXINE: CPT

## 2018-07-12 PROCEDURE — 84481 FREE ASSAY (FT-3): CPT

## 2018-07-12 PROCEDURE — 36415 COLL VENOUS BLD VENIPUNCTURE: CPT

## 2018-07-12 PROCEDURE — 84443 ASSAY THYROID STIM HORMONE: CPT

## 2018-07-26 ENCOUNTER — OFFICE VISIT (OUTPATIENT)
Dept: INTERNAL MEDICINE CLINIC | Facility: CLINIC | Age: 48
End: 2018-07-26
Payer: COMMERCIAL

## 2018-07-26 VITALS
BODY MASS INDEX: 47.77 KG/M2 | HEART RATE: 82 BPM | DIASTOLIC BLOOD PRESSURE: 80 MMHG | TEMPERATURE: 98 F | WEIGHT: 253 LBS | SYSTOLIC BLOOD PRESSURE: 132 MMHG | HEIGHT: 61 IN | RESPIRATION RATE: 16 BRPM

## 2018-07-26 DIAGNOSIS — R05.9 COUGH: Primary | ICD-10-CM

## 2018-07-26 PROCEDURE — 99213 OFFICE O/P EST LOW 20 MIN: CPT | Performed by: PHYSICIAN ASSISTANT

## 2018-07-26 RX ORDER — ALBUTEROL SULFATE 90 UG/1
2 AEROSOL, METERED RESPIRATORY (INHALATION) EVERY 4 HOURS PRN
Qty: 1 INHALER | Refills: 0 | Status: SHIPPED | OUTPATIENT
Start: 2018-07-26 | End: 2019-04-08

## 2018-07-26 RX ORDER — AZITHROMYCIN 250 MG/1
TABLET, FILM COATED ORAL
Qty: 6 TABLET | Refills: 0 | Status: SHIPPED | OUTPATIENT
Start: 2018-07-26 | End: 2018-08-23

## 2018-07-26 RX ORDER — PREDNISONE 10 MG/1
TABLET ORAL
Qty: 30 TABLET | Refills: 0 | Status: SHIPPED | OUTPATIENT
Start: 2018-07-26 | End: 2018-08-23

## 2018-07-26 NOTE — PROGRESS NOTES
Patient presents with:  Cough: AB RM 6, Patient states having a cough started in Nov  Sleep Problem: Patient states not able to sleep       HPI:  Pt presents with c/o cough.   Reports she has had a mild cough since November, originally thought secondary to needed for Wheezing or Shortness of Breath (cough). Disp: 1 Inhaler Rfl: 0   methimazole 10 MG Oral Tab Take 15 mg daily. Disp: 135 tablet Rfl: 1   AmLODIPine Besylate 5 MG Oral Tab Take 1 tablet (5 mg total) by mouth once daily.  Disp: 90 tablet Rfl: 3   L Inhale 2 puffs into the lungs every 4 (four) hours as needed for Wheezing or Shortness of Breath (cough). Imaging & Consults:  XR CHEST PA + LAT CHEST (CPT=71046)    Return in about 2 weeks (around 8/9/2018) for cough.   There are no Patient Instr

## 2018-08-18 ENCOUNTER — HOSPITAL ENCOUNTER (OUTPATIENT)
Dept: GENERAL RADIOLOGY | Age: 48
Discharge: HOME | End: 2018-08-18
Attending: PHYSICIAN ASSISTANT
Payer: COMMERCIAL

## 2018-08-18 DIAGNOSIS — R05.9 COUGH: ICD-10-CM

## 2018-08-18 PROCEDURE — 71046 X-RAY EXAM CHEST 2 VIEWS: CPT | Performed by: PHYSICIAN ASSISTANT

## 2018-08-23 ENCOUNTER — OFFICE VISIT (OUTPATIENT)
Dept: INTERNAL MEDICINE CLINIC | Facility: CLINIC | Age: 48
End: 2018-08-23
Payer: COMMERCIAL

## 2018-08-23 VITALS
BODY MASS INDEX: 49.28 KG/M2 | HEIGHT: 61 IN | RESPIRATION RATE: 22 BRPM | SYSTOLIC BLOOD PRESSURE: 136 MMHG | TEMPERATURE: 98 F | OXYGEN SATURATION: 98 % | DIASTOLIC BLOOD PRESSURE: 86 MMHG | WEIGHT: 261 LBS | HEART RATE: 84 BPM

## 2018-08-23 DIAGNOSIS — I10 ESSENTIAL HYPERTENSION: ICD-10-CM

## 2018-08-23 DIAGNOSIS — R05.9 COUGH: Primary | ICD-10-CM

## 2018-08-23 PROCEDURE — 99213 OFFICE O/P EST LOW 20 MIN: CPT | Performed by: PHYSICIAN ASSISTANT

## 2018-08-23 RX ORDER — IRBESARTAN AND HYDROCHLOROTHIAZIDE 150; 12.5 MG/1; MG/1
2 TABLET, FILM COATED ORAL DAILY
Qty: 60 TABLET | Refills: 0 | Status: SHIPPED | OUTPATIENT
Start: 2018-08-23 | End: 2018-09-20

## 2018-08-23 RX ORDER — FLUTICASONE PROPIONATE AND SALMETEROL 250; 50 UG/1; UG/1
1 POWDER RESPIRATORY (INHALATION) EVERY 12 HOURS SCHEDULED
Qty: 60 EACH | Refills: 1 | Status: SHIPPED | OUTPATIENT
Start: 2018-08-23 | End: 2018-09-20

## 2018-08-23 NOTE — PROGRESS NOTES
Patient presents with:  Cough: that wont go away. HPI:  Pt presents for follow up of cough. Pt states that cough has improved but not resolved. Was much better when she was on Prednisone but when Prednisone was stopped cough worsened.   Pt does repor tablet Rfl: 3   Albuterol Sulfate HFA (PROAIR HFA) 108 (90 Base) MCG/ACT Inhalation Aero Soln Inhale 2 puffs into the lungs every 4 (four) hours as needed for Wheezing or Shortness of Breath (cough).  Disp: 1 Inhaler Rfl: 0       Physical Exam  /86

## 2018-09-11 ENCOUNTER — APPOINTMENT (OUTPATIENT)
Dept: LAB | Age: 48
End: 2018-09-11
Attending: INTERNAL MEDICINE
Payer: COMMERCIAL

## 2018-09-11 DIAGNOSIS — I10 ESSENTIAL HYPERTENSION: ICD-10-CM

## 2018-09-11 DIAGNOSIS — E05.90 HYPERTHYROIDISM: ICD-10-CM

## 2018-09-11 DIAGNOSIS — R79.89 ABNORMAL LIVER FUNCTION TESTS: ICD-10-CM

## 2018-09-11 LAB
T3FREE SERPL-MCNC: 2.67 PG/ML (ref 2.3–4.2)
T4 FREE SERPL-MCNC: 1 NG/DL (ref 0.9–1.8)
TSI SER-ACNC: <0.005 MIU/ML (ref 0.35–5.5)

## 2018-09-11 PROCEDURE — 84481 FREE ASSAY (FT-3): CPT

## 2018-09-11 PROCEDURE — 84439 ASSAY OF FREE THYROXINE: CPT

## 2018-09-11 PROCEDURE — 36415 COLL VENOUS BLD VENIPUNCTURE: CPT

## 2018-09-11 PROCEDURE — 84443 ASSAY THYROID STIM HORMONE: CPT

## 2018-09-14 ENCOUNTER — TELEPHONE (OUTPATIENT)
Dept: INTERNAL MEDICINE CLINIC | Facility: CLINIC | Age: 48
End: 2018-09-14

## 2018-09-14 DIAGNOSIS — E05.90 HYPERTHYROIDISM: ICD-10-CM

## 2018-09-14 DIAGNOSIS — E05.00 GRAVES' OPHTHALMOPATHY: ICD-10-CM

## 2018-09-14 DIAGNOSIS — H54.7 VISION LOSS: Primary | ICD-10-CM

## 2018-09-14 NOTE — TELEPHONE ENCOUNTER
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 To fax to our office @[phone].  Pt verbalized understanding and agreed with POC, stating sinan

## 2018-09-14 NOTE — TELEPHONE ENCOUNTER
Patient went to here eye doctor and has sone loss of vision in her left eye. Her eye doctor is wanting her to get an MRI for swelling of the ocular muscle. She does have an apt on Thursday but wanted to know if she get get this before her apt.

## 2018-09-17 NOTE — TELEPHONE ENCOUNTER
I have only seen pt twice for acute appointments. If this needs to be addressed before I am back in the office you could ask SD to look at as she usually sees SD. Otherwise will have to wait till I am back in the office to review the paperwork.

## 2018-09-17 NOTE — TELEPHONE ENCOUNTER
Spoke with patient informed MRI order entered, will obtain and forward results to Dr. Lan Sheridan if needed. Patient has information to CS to schedule appointment. Patient verbalized understanding and agreeable to POC.    Copy of Joel's consult notes sent to sc

## 2018-09-20 ENCOUNTER — OFFICE VISIT (OUTPATIENT)
Dept: INTERNAL MEDICINE CLINIC | Facility: CLINIC | Age: 48
End: 2018-09-20
Payer: COMMERCIAL

## 2018-09-20 VITALS
WEIGHT: 259 LBS | BODY MASS INDEX: 48.9 KG/M2 | DIASTOLIC BLOOD PRESSURE: 74 MMHG | HEIGHT: 61 IN | OXYGEN SATURATION: 97 % | HEART RATE: 92 BPM | SYSTOLIC BLOOD PRESSURE: 110 MMHG | RESPIRATION RATE: 20 BRPM | TEMPERATURE: 99 F

## 2018-09-20 DIAGNOSIS — I10 ESSENTIAL HYPERTENSION: ICD-10-CM

## 2018-09-20 DIAGNOSIS — R05.9 COUGH: Primary | ICD-10-CM

## 2018-09-20 PROCEDURE — 99213 OFFICE O/P EST LOW 20 MIN: CPT | Performed by: PHYSICIAN ASSISTANT

## 2018-09-20 RX ORDER — IRBESARTAN AND HYDROCHLOROTHIAZIDE 150; 12.5 MG/1; MG/1
2 TABLET, FILM COATED ORAL DAILY
Qty: 180 TABLET | Refills: 1 | Status: SHIPPED | OUTPATIENT
Start: 2018-09-20 | End: 2018-09-20

## 2018-09-20 RX ORDER — IRBESARTAN AND HYDROCHLOROTHIAZIDE 150; 12.5 MG/1; MG/1
2 TABLET, FILM COATED ORAL DAILY
Qty: 60 TABLET | Refills: 0 | Status: SHIPPED | OUTPATIENT
Start: 2018-09-20 | End: 2019-04-08

## 2018-09-20 RX ORDER — FLUTICASONE PROPIONATE AND SALMETEROL 250; 50 UG/1; UG/1
1 POWDER RESPIRATORY (INHALATION) EVERY 12 HOURS SCHEDULED
Qty: 60 EACH | Refills: 1 | Status: SHIPPED | OUTPATIENT
Start: 2018-09-20 | End: 2019-04-08

## 2018-09-20 NOTE — PROGRESS NOTES
Patient presents with: Follow - Up: room-2 cf. 3 week follow up for cough. pt feeling better. HPI:  Pt presents for follow up of cough and BP. We stopped her B blocker at last OV as we thought might be contributing to bronchospasm.   We also added Ad Breath (cough). Disp: 1 Inhaler Rfl: 0       Physical Exam  /74   Pulse 92   Temp 98.8 °F (37.1 °C) (Oral)   Resp 20   Ht 61\"   Wt 259 lb   LMP 01/23/2017   SpO2 97%   Breastfeeding? No   BMI 48.94 kg/m²   Constitutional:  No distress.    HEENT:  Nor Consults:  None    Return in about 2 months (around 11/20/2018) for cough, bronchospasm. There are no Patient Instructions on file for this visit. All questions were answered and the patient understands the plan.

## 2018-09-24 ENCOUNTER — HOSPITAL ENCOUNTER (OUTPATIENT)
Dept: MRI IMAGING | Age: 48
Discharge: HOME | End: 2018-09-24
Attending: NURSE PRACTITIONER
Payer: COMMERCIAL

## 2018-09-24 DIAGNOSIS — H54.7 VISION LOSS: ICD-10-CM

## 2018-09-24 DIAGNOSIS — E05.00 GRAVES' OPHTHALMOPATHY: ICD-10-CM

## 2018-09-24 DIAGNOSIS — E05.90 HYPERTHYROIDISM: ICD-10-CM

## 2018-09-24 PROCEDURE — 70553 MRI BRAIN STEM W/O & W/DYE: CPT | Performed by: NURSE PRACTITIONER

## 2018-09-24 PROCEDURE — 70543 MRI ORBT/FAC/NCK W/O &W/DYE: CPT | Performed by: NURSE PRACTITIONER

## 2018-09-24 PROCEDURE — A9575 INJ GADOTERATE MEGLUMI 0.1ML: HCPCS | Performed by: NURSE PRACTITIONER

## 2018-09-27 NOTE — TELEPHONE ENCOUNTER
Please call [facility]   Have spoke to Dr Willie Simms and changes related to her hyperthyroid and needs urgent treatment with acute visual changes. Notes from eye doctor should be in media. Provide MRI report to them as well. Acute findings.   I will ca

## 2018-09-27 NOTE — TELEPHONE ENCOUNTER
Pt calling back saying she contacted [facility], was to ask  for Dr. Ashley Bo or Dr. Henrene Essex. Did not have any availability until November or December. Was told if they would get  a call from our office, they may get her in sooner.

## 2018-10-01 NOTE — TELEPHONE ENCOUNTER
Lm for pt ([id number] [address] per HIPAA) to confirm if has appt with [facility] or needs further assistance. To call back at the office or if any further questions.

## 2018-10-02 NOTE — TELEPHONE ENCOUNTER
Spoke with Miranda Gomez at [facility], to request a sooner appointment for patient. Miranda Gomez stating provider in our office will need to have a phone consultation with provider in their office to determine if patient needs to get in sooner.  Miranda Gomez in

## 2018-10-02 NOTE — TELEPHONE ENCOUNTER
Pt called back and stated that she was unable to make an appt with the [facility] due to the office not having any opening until late November or early December.  Pt stated that she called the office on 09/27/18 and stated this information and we wh

## 2018-10-03 NOTE — TELEPHONE ENCOUNTER
[facility] x 3 today and left message for Dr Larios Section to return my call. Faxed vision report and MRI report to his office with confirmation received attention to him. Awaiting call back.

## 2018-10-05 NOTE — TELEPHONE ENCOUNTER
Spoke to Dr Geiger Him   Reviewed patient with him. Faxed information to his office again, attn to Dr Geiger Him. He will review information and contact patient   Left message for Graeme Sher to call us next week to confirm she has been contacted by [facility].

## 2018-10-08 ENCOUNTER — LAB ENCOUNTER (OUTPATIENT)
Dept: LAB | Age: 48
End: 2018-10-08
Attending: INTERNAL MEDICINE
Payer: COMMERCIAL

## 2018-10-08 DIAGNOSIS — I10 ESSENTIAL HYPERTENSION: ICD-10-CM

## 2018-10-08 DIAGNOSIS — E05.90 HYPERTHYROIDISM: ICD-10-CM

## 2018-10-08 PROCEDURE — 36415 COLL VENOUS BLD VENIPUNCTURE: CPT | Performed by: PHYSICIAN ASSISTANT

## 2018-10-08 PROCEDURE — 80048 BASIC METABOLIC PNL TOTAL CA: CPT | Performed by: PHYSICIAN ASSISTANT

## 2018-10-09 ENCOUNTER — TELEPHONE (OUTPATIENT)
Dept: INTERNAL MEDICINE CLINIC | Facility: CLINIC | Age: 48
End: 2018-10-09

## 2018-10-09 NOTE — TELEPHONE ENCOUNTER
Left message for Shannon. Asked her to return my call to confirm she is aware of appt on Thursday at 1:40 and if she is able to make that appt.   Awaiting call back

## 2018-10-09 NOTE — TELEPHONE ENCOUNTER
Spoke with DR Yadira Hennessy office. appt available for Thursday at 1:40   They will call her now and inform her   I will contact her later today.

## 2018-11-01 VITALS — RESPIRATION RATE: 18 BRPM | HEART RATE: 90 BPM | TEMPERATURE: 98 F

## 2018-11-02 VITALS
RESPIRATION RATE: 18 BRPM | SYSTOLIC BLOOD PRESSURE: 128 MMHG | DIASTOLIC BLOOD PRESSURE: 68 MMHG | WEIGHT: 259.99 LBS | OXYGEN SATURATION: 97 % | HEART RATE: 99 BPM | HEIGHT: 60 IN | TEMPERATURE: 99 F | BODY MASS INDEX: 51.04 KG/M2

## 2018-11-09 ENCOUNTER — TELEPHONE (OUTPATIENT)
Dept: INTERNAL MEDICINE CLINIC | Facility: CLINIC | Age: 48
End: 2018-11-09

## 2019-03-09 ENCOUNTER — APPOINTMENT (OUTPATIENT)
Dept: LAB | Age: 49
End: 2019-03-09
Attending: INTERNAL MEDICINE
Payer: COMMERCIAL

## 2019-03-09 DIAGNOSIS — E05.90 HYPERTHYROIDISM: ICD-10-CM

## 2019-03-09 LAB
T4 FREE SERPL-MCNC: 0.8 NG/DL (ref 0.8–1.7)
TSI SER-ACNC: 5.81 MIU/ML (ref 0.36–3.74)

## 2019-03-09 PROCEDURE — 84443 ASSAY THYROID STIM HORMONE: CPT

## 2019-03-09 PROCEDURE — 36415 COLL VENOUS BLD VENIPUNCTURE: CPT

## 2019-03-09 PROCEDURE — 84439 ASSAY OF FREE THYROXINE: CPT

## 2019-04-08 ENCOUNTER — OFFICE VISIT (OUTPATIENT)
Dept: INTERNAL MEDICINE CLINIC | Facility: CLINIC | Age: 49
End: 2019-04-08
Payer: COMMERCIAL

## 2019-04-08 VITALS
DIASTOLIC BLOOD PRESSURE: 84 MMHG | BODY MASS INDEX: 51.32 KG/M2 | HEIGHT: 61 IN | TEMPERATURE: 98 F | OXYGEN SATURATION: 97 % | WEIGHT: 271.81 LBS | RESPIRATION RATE: 18 BRPM | HEART RATE: 92 BPM | SYSTOLIC BLOOD PRESSURE: 122 MMHG

## 2019-04-08 DIAGNOSIS — R05.9 COUGH: Primary | ICD-10-CM

## 2019-04-08 DIAGNOSIS — I10 ESSENTIAL HYPERTENSION: ICD-10-CM

## 2019-04-08 DIAGNOSIS — Z12.31 ENCOUNTER FOR SCREENING MAMMOGRAM FOR MALIGNANT NEOPLASM OF BREAST: ICD-10-CM

## 2019-04-08 DIAGNOSIS — R07.89 CHEST WALL TENDERNESS: ICD-10-CM

## 2019-04-08 DIAGNOSIS — Z00.00 LABORATORY EXAMINATION ORDERED AS PART OF A COMPLETE PHYSICAL EXAMINATION: ICD-10-CM

## 2019-04-08 DIAGNOSIS — J98.01 BRONCHOSPASM: ICD-10-CM

## 2019-04-08 PROCEDURE — 99214 OFFICE O/P EST MOD 30 MIN: CPT | Performed by: NURSE PRACTITIONER

## 2019-04-08 RX ORDER — FLUTICASONE PROPIONATE AND SALMETEROL 250; 50 UG/1; UG/1
1 POWDER RESPIRATORY (INHALATION) EVERY 12 HOURS SCHEDULED
Qty: 60 EACH | Refills: 1 | Status: SHIPPED | OUTPATIENT
Start: 2019-04-08 | End: 2019-05-06 | Stop reason: ALTCHOICE

## 2019-04-08 RX ORDER — IRBESARTAN AND HYDROCHLOROTHIAZIDE 150; 12.5 MG/1; MG/1
2 TABLET, FILM COATED ORAL DAILY
Qty: 180 TABLET | Refills: 2 | Status: SHIPPED | OUTPATIENT
Start: 2019-04-08 | End: 2020-02-14

## 2019-04-08 RX ORDER — AMLODIPINE BESYLATE 5 MG/1
5 TABLET ORAL
Qty: 90 TABLET | Refills: 3 | Status: SHIPPED | OUTPATIENT
Start: 2019-04-08 | End: 2020-04-27

## 2019-04-08 RX ORDER — FLUTICASONE PROPIONATE AND SALMETEROL 250; 50 UG/1; UG/1
POWDER RESPIRATORY (INHALATION)
COMMUNITY
Start: 2018-08-23 | End: 2019-04-08

## 2019-04-08 RX ORDER — ALBUTEROL SULFATE 90 UG/1
2 AEROSOL, METERED RESPIRATORY (INHALATION) EVERY 4 HOURS PRN
Qty: 1 INHALER | Refills: 0 | Status: SHIPPED | OUTPATIENT
Start: 2019-04-08 | End: 2020-07-28 | Stop reason: ALTCHOICE

## 2019-04-08 RX ORDER — AZITHROMYCIN 250 MG/1
TABLET, FILM COATED ORAL
Qty: 6 TABLET | Refills: 0 | Status: SHIPPED | OUTPATIENT
Start: 2019-04-08 | End: 2019-05-06 | Stop reason: ALTCHOICE

## 2019-04-08 NOTE — PROGRESS NOTES
Rosetta Burger is a 54year old female. Patient presents with:  Cough: cn room 10: patient is for coughing and wheezing , patient had it for a while but it has gotten worse over the last month       HPI:   Presents for eval of cough and chest congestion. Past Medical History:   Diagnosis Date   • High blood pressure    • Morbid obesity with BMI of 45.0-49.9, adult (HCC)    • PONV (postoperative nausea and vomiting)    • Unspecified essential hypertension 6/29/2012   • Visual impairment     contacts and g Cough  (primary encounter diagnosis)  Discussed suppression. adviar bid. Add proair. Zithromax. She has prednisone at home but would prefer not to use with recent weight gain and concern for hyperglycemia.      Encounter for screening mammogram for

## 2019-04-18 ENCOUNTER — TELEPHONE (OUTPATIENT)
Dept: INTERNAL MEDICINE CLINIC | Facility: CLINIC | Age: 49
End: 2019-04-18

## 2019-04-18 RX ORDER — BENZONATATE 200 MG/1
200 CAPSULE ORAL 3 TIMES DAILY PRN
Qty: 90 CAPSULE | Refills: 0 | Status: SHIPPED | OUTPATIENT
Start: 2019-04-18 | End: 2019-05-06 | Stop reason: ALTCHOICE

## 2019-04-18 RX ORDER — PREDNISONE 20 MG/1
TABLET ORAL
Qty: 15 TABLET | Refills: 0 | Status: SHIPPED | OUTPATIENT
Start: 2019-04-18 | End: 2019-04-28

## 2019-04-18 RX ORDER — CEFUROXIME AXETIL 500 MG/1
500 TABLET ORAL 2 TIMES DAILY
Qty: 20 TABLET | Refills: 0 | Status: SHIPPED | OUTPATIENT
Start: 2019-04-18 | End: 2019-05-06 | Stop reason: ALTCHOICE

## 2019-04-18 RX ORDER — BUDESONIDE AND FORMOTEROL FUMARATE DIHYDRATE 160; 4.5 UG/1; UG/1
2 AEROSOL RESPIRATORY (INHALATION) 2 TIMES DAILY
Qty: 1 INHALER | Refills: 0 | Status: SHIPPED | OUTPATIENT
Start: 2019-04-18 | End: 2019-05-06

## 2019-04-18 NOTE — TELEPHONE ENCOUNTER
Spoke with patient stating was seen by SD on 4/8/2019, has completed Zithromax medication, continues to use Proair and has been taking Prednisone 40mg (3rd day) but continues to have cough and tightness in chest. Patient did not get Advair too expensive.  P

## 2019-04-18 NOTE — TELEPHONE ENCOUNTER
Spoke with patient   Discussed symptoms and cough suppression   Defers codeine. Tessalon pereles. symbicort and prednisone ordered. ceftin if congestion worsens. Call with unresolved symptoms.

## 2019-04-18 NOTE — TELEPHONE ENCOUNTER
Patient was in to see SD on 4/8 and was put on an antibiotic(Z-amos). Patient states she was feeling better but since off of the antibiotic some of the same symptoms are coming back;   No fever, coughing up slight green phlegm and is starting to have pain w

## 2019-04-24 ENCOUNTER — HOSPITAL ENCOUNTER (OUTPATIENT)
Dept: ULTRASOUND IMAGING | Facility: HOSPITAL | Age: 49
Discharge: HOME | End: 2019-04-24
Attending: NURSE PRACTITIONER
Payer: COMMERCIAL

## 2019-04-24 ENCOUNTER — LAB ENCOUNTER (OUTPATIENT)
Dept: LAB | Facility: HOSPITAL | Age: 49
End: 2019-04-24
Attending: NURSE PRACTITIONER
Payer: COMMERCIAL

## 2019-04-24 DIAGNOSIS — R07.89 CHEST WALL TENDERNESS: ICD-10-CM

## 2019-04-24 DIAGNOSIS — Z00.00 LABORATORY EXAMINATION ORDERED AS PART OF A COMPLETE PHYSICAL EXAMINATION: ICD-10-CM

## 2019-04-24 DIAGNOSIS — E05.90 HYPERTHYROIDISM: ICD-10-CM

## 2019-04-24 DIAGNOSIS — R05.9 COUGH: ICD-10-CM

## 2019-04-24 DIAGNOSIS — R79.89 ABNORMAL LIVER FUNCTION TESTS: ICD-10-CM

## 2019-04-24 DIAGNOSIS — D72.829 LEUKOCYTOSIS, UNSPECIFIED TYPE: Primary | ICD-10-CM

## 2019-04-24 PROCEDURE — 80053 COMPREHEN METABOLIC PANEL: CPT

## 2019-04-24 PROCEDURE — 84439 ASSAY OF FREE THYROXINE: CPT

## 2019-04-24 PROCEDURE — 36415 COLL VENOUS BLD VENIPUNCTURE: CPT

## 2019-04-24 PROCEDURE — 76604 US EXAM CHEST: CPT | Performed by: NURSE PRACTITIONER

## 2019-04-24 PROCEDURE — 80061 LIPID PANEL: CPT

## 2019-04-24 PROCEDURE — 85025 COMPLETE CBC W/AUTO DIFF WBC: CPT

## 2019-04-24 PROCEDURE — 84443 ASSAY THYROID STIM HORMONE: CPT

## 2019-04-24 PROCEDURE — 84481 FREE ASSAY (FT-3): CPT

## 2019-04-26 NOTE — PROGRESS NOTES
Viewed by Enma Ruiz on 4/25/2019  9:53 PM   Written by Kem Guzman MD on 4/25/2019  9:32 PM   Dear Quynh Briseno,   Your thyroid lab is mildly hypothyroid. Please decrease from 10 mg to 5 mg daily of methimazole.  Your metabolic panel is okay.  You will need f

## 2019-05-04 ENCOUNTER — HOSPITAL ENCOUNTER (OUTPATIENT)
Dept: GENERAL RADIOLOGY | Age: 49
Discharge: HOME | End: 2019-05-04
Attending: NURSE PRACTITIONER
Payer: COMMERCIAL

## 2019-05-04 DIAGNOSIS — D72.829 LEUKOCYTOSIS, UNSPECIFIED TYPE: ICD-10-CM

## 2019-05-04 DIAGNOSIS — R05.9 COUGH: ICD-10-CM

## 2019-05-04 DIAGNOSIS — D72.829 LEUKOCYTOSIS, UNSPECIFIED TYPE: Primary | ICD-10-CM

## 2019-05-04 PROCEDURE — 71046 X-RAY EXAM CHEST 2 VIEWS: CPT | Performed by: NURSE PRACTITIONER

## 2019-05-06 ENCOUNTER — OFFICE VISIT (OUTPATIENT)
Dept: INTERNAL MEDICINE CLINIC | Facility: CLINIC | Age: 49
End: 2019-05-06
Payer: COMMERCIAL

## 2019-05-06 VITALS
SYSTOLIC BLOOD PRESSURE: 112 MMHG | TEMPERATURE: 98 F | HEART RATE: 96 BPM | HEIGHT: 61 IN | DIASTOLIC BLOOD PRESSURE: 72 MMHG | WEIGHT: 272 LBS | BODY MASS INDEX: 51.35 KG/M2

## 2019-05-06 DIAGNOSIS — R05.9 COUGH: ICD-10-CM

## 2019-05-06 DIAGNOSIS — J98.01 POST-INFECTION BRONCHOSPASM: ICD-10-CM

## 2019-05-06 DIAGNOSIS — I10 ESSENTIAL HYPERTENSION: ICD-10-CM

## 2019-05-06 DIAGNOSIS — E66.01 MORBID OBESITY WITH BMI OF 45.0-49.9, ADULT (HCC): ICD-10-CM

## 2019-05-06 DIAGNOSIS — Z00.00 ROUTINE GENERAL MEDICAL EXAMINATION AT A HEALTH CARE FACILITY: Primary | ICD-10-CM

## 2019-05-06 DIAGNOSIS — E05.90 HYPERTHYROIDISM: ICD-10-CM

## 2019-05-06 DIAGNOSIS — J34.1 NASAL SINUS CYST: ICD-10-CM

## 2019-05-06 PROBLEM — M62.81 PROXIMAL MUSCLE WEAKNESS: Status: RESOLVED | Noted: 2017-10-26 | Resolved: 2019-05-06

## 2019-05-06 PROBLEM — R79.89 ABNORMAL LIVER FUNCTION TESTS: Status: RESOLVED | Noted: 2017-10-26 | Resolved: 2019-05-06

## 2019-05-06 PROCEDURE — 99396 PREV VISIT EST AGE 40-64: CPT | Performed by: NURSE PRACTITIONER

## 2019-05-06 RX ORDER — BUDESONIDE AND FORMOTEROL FUMARATE DIHYDRATE 160; 4.5 UG/1; UG/1
2 AEROSOL RESPIRATORY (INHALATION) 2 TIMES DAILY
Qty: 1 INHALER | Refills: 3 | Status: SHIPPED | OUTPATIENT
Start: 2019-05-06 | End: 2020-02-14

## 2019-05-06 RX ORDER — METHIMAZOLE 5 MG/1
5 TABLET ORAL DAILY
COMMUNITY
End: 2020-02-14

## 2019-05-06 NOTE — PROGRESS NOTES
Jd Tello is a 54year old female who presents for a complete physical exam:       Patient complains of:    Cough/recent URI   Slowly improving. Off antibiotics. Continues to use symbicort. Elevated WBC  Likely due to recent URI. CXR negative. Inhalation Aero Soln Inhale 2 puffs into the lungs every 4 (four) hours as needed for Wheezing or Shortness of Breath (cough). Disp: 1 Inhaler Rfl: 0   amLODIPine Besylate 5 MG Oral Tab Take 1 tablet (5 mg total) by mouth once daily.  Disp: 90 tablet Rfl: 3 bearing exercises. Patient is currently taking calcium and Vitamin D supplementation.         REVIEW OF SYSTEMS:   GENERAL: feels well otherwise  SKIN: denies any unusual skin lesions  EYES:denies blurred vision or double vision  HEENT: denies nasal conges ordered in this encounter       Imaging & Consults:  None    No follow-ups on file. There are no Patient Instructions on file for this visit.

## 2019-06-03 ENCOUNTER — HOSPITAL ENCOUNTER (OUTPATIENT)
Dept: MAMMOGRAPHY | Age: 49
Discharge: HOME | End: 2019-06-03
Attending: NURSE PRACTITIONER
Payer: COMMERCIAL

## 2019-06-03 ENCOUNTER — LAB ENCOUNTER (OUTPATIENT)
Dept: LAB | Age: 49
End: 2019-06-03
Attending: INTERNAL MEDICINE
Payer: COMMERCIAL

## 2019-06-03 DIAGNOSIS — Z12.31 ENCOUNTER FOR SCREENING MAMMOGRAM FOR MALIGNANT NEOPLASM OF BREAST: ICD-10-CM

## 2019-06-03 DIAGNOSIS — D72.829 LEUKOCYTOSIS, UNSPECIFIED TYPE: ICD-10-CM

## 2019-06-03 DIAGNOSIS — E05.90 HYPERTHYROIDISM: ICD-10-CM

## 2019-06-03 PROCEDURE — 77063 BREAST TOMOSYNTHESIS BI: CPT | Performed by: NURSE PRACTITIONER

## 2019-06-03 PROCEDURE — 85025 COMPLETE CBC W/AUTO DIFF WBC: CPT | Performed by: NURSE PRACTITIONER

## 2019-06-03 PROCEDURE — 77067 SCR MAMMO BI INCL CAD: CPT | Performed by: NURSE PRACTITIONER

## 2019-06-03 PROCEDURE — 36415 COLL VENOUS BLD VENIPUNCTURE: CPT | Performed by: NURSE PRACTITIONER

## 2019-06-13 ENCOUNTER — HOSPITAL ENCOUNTER (OUTPATIENT)
Dept: MAMMOGRAPHY | Facility: HOSPITAL | Age: 49
Discharge: HOME | End: 2019-06-13
Attending: NURSE PRACTITIONER
Payer: COMMERCIAL

## 2019-06-13 DIAGNOSIS — R92.2 INCONCLUSIVE MAMMOGRAM: ICD-10-CM

## 2019-06-13 DIAGNOSIS — R92.8 ABNORMAL MAMMOGRAM: Primary | ICD-10-CM

## 2019-06-13 PROCEDURE — 77065 DX MAMMO INCL CAD UNI: CPT | Performed by: NURSE PRACTITIONER

## 2019-06-13 PROCEDURE — 76642 ULTRASOUND BREAST LIMITED: CPT | Performed by: NURSE PRACTITIONER

## 2019-06-13 PROCEDURE — 77061 BREAST TOMOSYNTHESIS UNI: CPT | Performed by: NURSE PRACTITIONER

## 2019-06-30 ENCOUNTER — WALK IN (OUTPATIENT)
Dept: URGENT CARE | Age: 49
End: 2019-06-30

## 2019-06-30 VITALS
DIASTOLIC BLOOD PRESSURE: 80 MMHG | RESPIRATION RATE: 18 BRPM | SYSTOLIC BLOOD PRESSURE: 110 MMHG | HEART RATE: 78 BPM | TEMPERATURE: 98.4 F

## 2019-06-30 DIAGNOSIS — H10.33 ACUTE BACTERIAL CONJUNCTIVITIS OF BOTH EYES: Primary | ICD-10-CM

## 2019-06-30 PROCEDURE — 99214 OFFICE O/P EST MOD 30 MIN: CPT | Performed by: NURSE PRACTITIONER

## 2019-06-30 RX ORDER — POLYMYXIN B SULFATE AND TRIMETHOPRIM 1; 10000 MG/ML; [USP'U]/ML
1 SOLUTION OPHTHALMIC EVERY 4 HOURS
Qty: 10 ML | Refills: 0 | Status: SHIPPED | OUTPATIENT
Start: 2019-06-30 | End: 2019-07-07

## 2019-06-30 RX ORDER — IRBESARTAN 150 MG/1
300 TABLET ORAL DAILY
COMMUNITY
End: 2022-12-20 | Stop reason: ALTCHOICE

## 2019-06-30 RX ORDER — HYDROCHLOROTHIAZIDE 12.5 MG/1
25 TABLET ORAL DAILY
COMMUNITY
End: 2022-12-20 | Stop reason: ALTCHOICE

## 2019-06-30 RX ORDER — AMLODIPINE BESYLATE 5 MG/1
5 TABLET ORAL DAILY
COMMUNITY
End: 2022-12-20 | Stop reason: ALTCHOICE

## 2019-06-30 RX ORDER — METHIMAZOLE 5 MG/1
5 TABLET ORAL DAILY
COMMUNITY
End: 2022-12-20 | Stop reason: ALTCHOICE

## 2019-06-30 RX ORDER — BUDESONIDE AND FORMOTEROL FUMARATE DIHYDRATE 160; 4.5 UG/1; UG/1
2 AEROSOL RESPIRATORY (INHALATION) 2 TIMES DAILY
COMMUNITY
End: 2022-12-20 | Stop reason: ALTCHOICE

## 2019-06-30 SDOH — HEALTH STABILITY: MENTAL HEALTH: HOW OFTEN DO YOU HAVE A DRINK CONTAINING ALCOHOL?: NEVER

## 2019-06-30 ASSESSMENT — ENCOUNTER SYMPTOMS
EYE ITCHING: 1
EYE DISCHARGE: 1
RESPIRATORY NEGATIVE: 1
EYE REDNESS: 1
PHOTOPHOBIA: 0
BLURRED VISION: 0

## 2019-07-01 ENCOUNTER — MED REC SCAN ONLY (OUTPATIENT)
Dept: INTERNAL MEDICINE CLINIC | Facility: CLINIC | Age: 49
End: 2019-07-01

## 2019-08-02 ENCOUNTER — TELEPHONE (OUTPATIENT)
Dept: INTERNAL MEDICINE CLINIC | Facility: CLINIC | Age: 49
End: 2019-08-02

## 2019-08-02 NOTE — TELEPHONE ENCOUNTER
Pt faxed physical form here yesterday for her new job for us to fill out per cpe 5/6/19-did we get it and what is the statuts?

## 2019-08-06 NOTE — TELEPHONE ENCOUNTER
LM on pt preferred number notifying paperwork ready for pickup. Pt notified to contact office if she would like paperwork to be faxed or picked up. Placed in bin at  for  until receive response.

## 2019-08-07 NOTE — TELEPHONE ENCOUNTER
Pt called stated she will send her daughter up to  paperwork. Edmundo Ronquillo is her name. Will not have an ID on her.

## 2019-08-10 ENCOUNTER — APPOINTMENT (OUTPATIENT)
Dept: LAB | Age: 49
End: 2019-08-10
Attending: INTERNAL MEDICINE
Payer: COMMERCIAL

## 2019-08-10 DIAGNOSIS — I10 ESSENTIAL HYPERTENSION: ICD-10-CM

## 2019-08-10 DIAGNOSIS — E05.90 HYPERTHYROIDISM: ICD-10-CM

## 2019-08-10 LAB
T3FREE SERPL-MCNC: 2.1 PG/ML (ref 2.4–4.2)
T4 FREE SERPL-MCNC: 1.1 NG/DL (ref 0.8–1.7)
TSI SER-ACNC: 2.32 MIU/ML (ref 0.36–3.74)

## 2019-08-10 PROCEDURE — 36415 COLL VENOUS BLD VENIPUNCTURE: CPT

## 2019-08-10 PROCEDURE — 84481 FREE ASSAY (FT-3): CPT

## 2019-08-10 PROCEDURE — 84443 ASSAY THYROID STIM HORMONE: CPT

## 2019-08-10 PROCEDURE — 84439 ASSAY OF FREE THYROXINE: CPT

## 2019-11-25 NOTE — TELEPHONE ENCOUNTER
Assessment/Plan:    Acute bronchitis due to other specified organisms   Patient is here today in the office complaining of an upper respiratory tract infection, cough over the past 3 weeks which is not subsiding  Patient states occasionally he is coughing up some clear but not colored sputum  Occasional wheezes  Chest congestion  He feels there is a lot of mucus in the chest and is difficult to get up  He states there is some shortness of breath but not severely so  On evaluation patient does have rhonchi heard both anteriorly and posteriorly which after albuterol updraft treatment did decrease in intensity and especially after cough  He has no swelling of the feet and ankles, heart rate is stable  Patient will be treated aggressively, is secondary to prolonged illness and worried about upcoming procedure with resection of bladder tumor  Patient is placed on azithromycin Z-Conrad to take as directed, tapering dose of prednisone 5 milligrams, given prescription for an albuterol inhaler to use as needed  Patient is told call us with an update later in the week or sooner if not improving or worsening symptoms  Patient states he is no longer smoking and does have a nicotine patch on which she states is helpful with his cravings  Hypertension  Patient does have a history of elevated blood pressure readings in the past   As noted patient's blood pressure showing adequate control despite the fact the patient is on no medication  Patient will continue present surveillance  Urothelial carcinoma of bladder [facility])  Patient states he does have recurrence of his bladder tumor  His going back for resection and evaluation by Urology  States he may be going on chemotherapy postoperatively  Nicotine dependence  Again patient is commended on the fact that he is not smoking at this point time and is on a patch for nicotine replacement    We did discuss with the patient the importance of quitting completely especially Called CARISSA martino for first available appt with provider due to abnormal thyroid labs.   Awaiting call back with toxins that could be concentrating in his urinary bladder       Diagnoses and all orders for this visit:    Prostate cancer screening  -     PSA, Total Screen; Future    Essential hypertension    Bladder mass    Acute bronchitis due to other specified organisms  -     azithromycin (ZITHROMAX) 250 mg tablet; Take 2 tablets today then 1 tablet daily x 4 days  -     predniSONE 5 mg tablet; Take 1 tablet (5 mg total) by mouth daily TAKE 2 PILLS TWICE A DAY WITH FOOD FOR 3 DAYS THEN 1 PILL TWICE A DAY WITH FOOD FOR 3 DAYS THEN 1 PILL DAILY TO FINISH  -     albuterol (PROAIR HFA) 90 mcg/act inhaler; Inhale 2 puffs every 6 (six) hours as needed for wheezing    Cigarette nicotine dependence without complication          Subjective:      Patient ID: Denny Cox is a 60 y o  male  73-year-old male with a history of multiple medical problems as outlined previously  Patient is here today for for evaluation of an upper respiratory tract infection stating he has been ill for approximately 3 weeks with chest congestion, chronic cough  He denies any fever or chills  Occasionally some shortness of breath and wheezing  No nausea vomiting or diarrhea  Some nasal congestion with clear nasal discharge  The following portions of the patient's history were reviewed and updated as appropriate: He  has a past medical history of History of transfusion (2013) and Infectious viral hepatitis    He   Patient Active Problem List    Diagnosis Date Noted    Acute bronchitis due to other specified organisms 11/25/2019    [facility] 11/04/2019    Flu-like symptoms 03/06/2019    [facility] 09/26/2018    Status post appendectomy 08/15/2018    Bladder mass 08/15/2018    Generalized anxiety disorder 09/02/2016    Hepatitis C without hepatic coma 12/28/2015    Hypertension 11/06/2015    Atopic dermatitis 11/06/2015    Nicotine dependence 08/23/2012     He  has a past surgical history that includes Appendectomy; Ankle fracture surgery (2013); Shoulder arthroscopy (Bilateral); Tonsillectomy; Colonoscopy; pr cystourethroscopy,fulgur 0 5-2 cm lesn (N/A, 9/12/2018); pr cystourethroscopy,fulgur <0 5 cm lesn (N/A, 9/12/2018); pr cystourethroscopy,fulgur <0 5 cm lesn (N/A, 11/14/2018); pr cysto/uretero/pyeloscopy, dx (Left, 4/10/2019); and [place] retrograde pyelogram (4/10/2019)  His family history includes Brain cancer in his father; Kidney failure in his mother  He  reports that he has been smoking  He has been smoking about 0 25 packs per day  He has never used smokeless tobacco  He reports that he drank alcohol  He reports that he does not use drugs  Current Outpatient Medications   Medication Sig Dispense Refill    ibuprofen (MOTRIN) 600 mg tablet Take 1 tablet (600 mg total) by mouth every 6 (six) hours as needed for moderate pain 10 tablet 0    triamcinolone (KENALOG) 0 1 % ointment APPLY TO SKIN ONCE DAILY AT NIGHT  1    albuterol (PROAIR HFA) 90 mcg/act inhaler Inhale 2 puffs every 6 (six) hours as needed for wheezing 1 Inhaler 2    azithromycin (ZITHROMAX) 250 mg tablet Take 2 tablets today then 1 tablet daily x 4 days 6 tablet 0    predniSONE 5 mg tablet Take 1 tablet (5 mg total) by mouth daily TAKE 2 PILLS TWICE A DAY WITH FOOD FOR 3 DAYS THEN 1 PILL TWICE A DAY WITH FOOD FOR 3 DAYS THEN 1 PILL DAILY TO FINISH 21 tablet 0     No current facility-administered medications for this visit  Current Outpatient Medications on File Prior to Visit   Medication Sig    ibuprofen (MOTRIN) 600 mg tablet Take 1 tablet (600 mg total) by mouth every 6 (six) hours as needed for moderate pain    triamcinolone (KENALOG) 0 1 % ointment APPLY TO SKIN ONCE DAILY AT NIGHT     No current facility-administered medications on file prior to visit  He is allergic to hydrocodone-acetaminophen       Review of Systems   Constitutional: Positive for fatigue (Patient states he is somewhat fatigued with chronic cough and illness)  Negative for activity change, appetite change, chills, diaphoresis, fever and unexpected weight change  HENT: Positive for congestion, postnasal drip and rhinorrhea  Negative for dental problem, drooling, ear discharge, ear pain, facial swelling, hearing loss, mouth sores, nosebleeds, sinus pressure, sinus pain, sneezing, sore throat, tinnitus, trouble swallowing and voice change  Eyes: Negative  Respiratory: Positive for cough and wheezing  Negative for apnea, choking, chest tightness, shortness of breath and stridor  Gastrointestinal: Negative  Endocrine: Negative  Genitourinary: Negative  Musculoskeletal: Negative  Skin: Negative  Allergic/Immunologic: Negative  Neurological: Negative  Hematological: Negative  Psychiatric/Behavioral: Negative  Objective:      /74   Pulse 75   Temp 97 9 °F (36 6 °C)   Ht 5' 8" (1 727 m)   Wt 82 6 kg (182 lb)   SpO2 98%   BMI 27 67 kg/m²          Physical Exam   Constitutional: He is oriented to person, place, and time  He appears well-developed and well-nourished  No distress  Pleasant, slightly congested-sounding 82-year-old male who is awake alert   HENT:   Head: Normocephalic and atraumatic  Right Ear: External ear normal    Left Ear: External ear normal    Mouth/Throat: Oropharyngeal exudate present  Patient does have some mild erythema to nasal mucosa but turbinates are not enlarged, slight erythema to posterior airway with a whitish postnasal drip   Eyes: Pupils are equal, round, and reactive to light  Conjunctivae and EOM are normal  Right eye exhibits no discharge  Left eye exhibits no discharge  No scleral icterus  Neck: Normal range of motion  Neck supple  No JVD present  No tracheal deviation present  No thyromegaly present  Cardiovascular: Normal rate, regular rhythm, normal heart sounds and intact distal pulses  Exam reveals no gallop and no friction rub     No murmur heard  Pulmonary/Chest: Effort normal  No stridor  No respiratory distress  He has wheezes  He has no rales  He exhibits no tenderness  Slightly decreased breath sounds anteriorly and posteriorly with rhonchi heard through lung fields both anteriorly and posteriorly bilaterally  Post updraft nebulizer treatment with albuterol patient did have some better air flow and after a deep cough some clearing to his lung fields  Abdominal: Soft  Bowel sounds are normal  He exhibits no distension and no mass  There is no tenderness  There is no rebound and no guarding  No hernia  Musculoskeletal: Normal range of motion  Lymphadenopathy:     He has no cervical adenopathy  Neurological: He is alert and oriented to person, place, and time  Skin: Skin is warm and dry  He is not diaphoretic  Psychiatric: He has a normal mood and affect  His behavior is normal  Judgment and thought content normal    Nursing note and vitals reviewed

## 2019-12-09 ENCOUNTER — HOSPITAL ENCOUNTER (OUTPATIENT)
Dept: MAMMOGRAPHY | Facility: HOSPITAL | Age: 49
Discharge: HOME | End: 2019-12-09
Attending: NURSE PRACTITIONER
Payer: COMMERCIAL

## 2019-12-09 DIAGNOSIS — R92.8 ABNORMAL MAMMOGRAM: ICD-10-CM

## 2019-12-09 PROCEDURE — 77065 DX MAMMO INCL CAD UNI: CPT | Performed by: NURSE PRACTITIONER

## 2019-12-09 PROCEDURE — 77061 BREAST TOMOSYNTHESIS UNI: CPT | Performed by: NURSE PRACTITIONER

## 2019-12-09 PROCEDURE — 76642 ULTRASOUND BREAST LIMITED: CPT | Performed by: NURSE PRACTITIONER

## 2020-01-16 PROBLEM — E05.00 GRAVES DISEASE: Status: ACTIVE | Noted: 2017-10-15

## 2020-01-16 PROBLEM — E66.01 MORBID OBESITY WITH BODY MASS INDEX OF 45.0-49.9 IN ADULT (HCC): Status: ACTIVE | Noted: 2018-10-11

## 2020-01-16 PROBLEM — J34.1 NASAL SINUS CYST: Status: RESOLVED | Noted: 2017-10-09 | Resolved: 2020-01-16

## 2020-01-16 PROBLEM — R42 VERTIGO: Status: RESOLVED | Noted: 2017-10-01 | Resolved: 2020-01-16

## 2020-01-16 RX ORDER — IRBESARTAN 150 MG/1
300 TABLET ORAL
COMMUNITY
End: 2020-07-28 | Stop reason: ALTCHOICE

## 2020-01-16 RX ORDER — METHIMAZOLE 5 MG/1
5 TABLET ORAL
COMMUNITY
End: 2020-03-23

## 2020-01-16 RX ORDER — HYDROCHLOROTHIAZIDE 12.5 MG/1
25 TABLET ORAL
COMMUNITY
End: 2020-07-28 | Stop reason: ALTCHOICE

## 2020-01-16 RX ORDER — AMLODIPINE BESYLATE 5 MG/1
5 TABLET ORAL
COMMUNITY
End: 2020-02-14

## 2020-01-16 RX ORDER — METHIMAZOLE 10 MG/1
10 TABLET ORAL
Refills: 0 | COMMUNITY
Start: 2019-08-28 | End: 2020-02-14

## 2020-01-16 RX ORDER — BUDESONIDE AND FORMOTEROL FUMARATE DIHYDRATE 160; 4.5 UG/1; UG/1
2 AEROSOL RESPIRATORY (INHALATION)
COMMUNITY
End: 2020-02-14

## 2020-02-14 ENCOUNTER — OFFICE VISIT (OUTPATIENT)
Dept: INTERNAL MEDICINE CLINIC | Facility: CLINIC | Age: 50
End: 2020-02-14
Payer: COMMERCIAL

## 2020-02-14 VITALS
HEART RATE: 80 BPM | BODY MASS INDEX: 48.02 KG/M2 | SYSTOLIC BLOOD PRESSURE: 106 MMHG | WEIGHT: 254.38 LBS | TEMPERATURE: 98 F | HEIGHT: 61 IN | DIASTOLIC BLOOD PRESSURE: 76 MMHG

## 2020-02-14 DIAGNOSIS — I10 ESSENTIAL HYPERTENSION: Primary | ICD-10-CM

## 2020-02-14 DIAGNOSIS — E05.00 GRAVES DISEASE: ICD-10-CM

## 2020-02-14 DIAGNOSIS — E05.90 HYPERTHYROIDISM: ICD-10-CM

## 2020-02-14 DIAGNOSIS — J98.01 BRONCHOSPASM: ICD-10-CM

## 2020-02-14 PROCEDURE — 99214 OFFICE O/P EST MOD 30 MIN: CPT | Performed by: NURSE PRACTITIONER

## 2020-02-14 RX ORDER — CETIRIZINE HYDROCHLORIDE 5 MG/1
5 TABLET ORAL DAILY
COMMUNITY
End: 2021-04-15

## 2020-02-14 RX ORDER — BUDESONIDE AND FORMOTEROL FUMARATE DIHYDRATE 160; 4.5 UG/1; UG/1
2 AEROSOL RESPIRATORY (INHALATION) 2 TIMES DAILY
Qty: 1 INHALER | Refills: 5 | Status: SHIPPED | OUTPATIENT
Start: 2020-02-14 | End: 2020-07-28 | Stop reason: ALTCHOICE

## 2020-02-14 RX ORDER — MONTELUKAST SODIUM 10 MG/1
10 TABLET ORAL DAILY
Qty: 90 TABLET | Refills: 3 | Status: SHIPPED | OUTPATIENT
Start: 2020-02-14 | End: 2021-04-01

## 2020-02-14 RX ORDER — IRBESARTAN AND HYDROCHLOROTHIAZIDE 150; 12.5 MG/1; MG/1
1 TABLET, FILM COATED ORAL 2 TIMES DAILY
Qty: 180 TABLET | Refills: 2 | COMMUNITY
Start: 2020-02-14 | End: 2020-03-02

## 2020-02-14 NOTE — PROGRESS NOTES
Kaelyn Cifuentes is a 52year old female. Patient presents with:   Follow - Up: DD Rm 11, Health Maintenance F/U  Medication Request: Symbicort, requesting paper script      HPI:   Here for 6 month f/u   Follows with Dr Jose G Green for hyperthyroid   States if labs pressure    • Morbid obesity with BMI of 45.0-49.9, adult (HCC)    • PONV (postoperative nausea and vomiting)    • Unspecified essential hypertension 6/29/2012   • Visual impairment     contacts and glasses      Social History:  Social History    Tobacco U Placed This Encounter      Comp Metabolic Panel (14)      Meds & Refills for this Visit:  Requested Prescriptions     Signed Prescriptions Disp Refills   • Montelukast Sodium (SINGULAIR) 10 MG Oral Tab 90 tablet 3     Sig: Take 1 tablet (10 mg total) by mo

## 2020-02-28 DIAGNOSIS — I10 ESSENTIAL HYPERTENSION: ICD-10-CM

## 2020-02-28 RX ORDER — IRBESARTAN AND HYDROCHLOROTHIAZIDE 150; 12.5 MG/1; MG/1
TABLET, FILM COATED ORAL
Qty: 180 TABLET | Refills: 2 | OUTPATIENT
Start: 2020-02-28

## 2020-02-28 NOTE — TELEPHONE ENCOUNTER
Last VISIT 2/14/20 SD    Last REFILL 2/14/2020 180T 2R    Last LABS 1/20/20 TSH, FreeT4, Free T3, CMP    Future Appointments   Date Time Provider Glenda Rivas   7/28/2020  8:00 AM Morena Tate MD [address] [place] [address]         Per PROTOCOL?  HTN Protocol passe

## 2020-03-02 ENCOUNTER — TELEPHONE (OUTPATIENT)
Dept: INTERNAL MEDICINE CLINIC | Facility: CLINIC | Age: 50
End: 2020-03-02

## 2020-03-02 DIAGNOSIS — I10 ESSENTIAL HYPERTENSION: ICD-10-CM

## 2020-03-02 RX ORDER — IRBESARTAN AND HYDROCHLOROTHIAZIDE 150; 12.5 MG/1; MG/1
1 TABLET, FILM COATED ORAL 2 TIMES DAILY
Qty: 180 TABLET | Refills: 2 | Status: SHIPPED | OUTPATIENT
Start: 2020-03-02 | End: 2021-08-17

## 2020-03-02 NOTE — TELEPHONE ENCOUNTER
Irbesartan-hydroCHLOROthiazide 150-12.5 MG Oral Tab 180 tablet 2     Pls send to mail order.  Pt is asking if script was sent?/

## 2020-03-02 NOTE — TELEPHONE ENCOUNTER
Called pt to discuss. Pt did receive Singulair that had been sent to mail order but mail order not showing receipt of Irbesartan-hctz. MA resending order to [facility].

## 2020-03-10 ENCOUNTER — APPOINTMENT (OUTPATIENT)
Dept: LAB | Age: 50
End: 2020-03-10
Attending: INTERNAL MEDICINE
Payer: COMMERCIAL

## 2020-03-10 DIAGNOSIS — I10 ESSENTIAL HYPERTENSION: ICD-10-CM

## 2020-03-10 DIAGNOSIS — E05.90 HYPERTHYROIDISM: ICD-10-CM

## 2020-03-10 LAB
ALBUMIN SERPL-MCNC: 3.3 G/DL (ref 3.4–5)
ALBUMIN/GLOB SERPL: 0.8 {RATIO} (ref 1–2)
ALP LIVER SERPL-CCNC: 80 U/L (ref 39–100)
ALT SERPL-CCNC: 20 U/L (ref 13–56)
ANION GAP SERPL CALC-SCNC: 1 MMOL/L (ref 0–18)
AST SERPL-CCNC: 29 U/L (ref 15–37)
BILIRUB SERPL-MCNC: 0.4 MG/DL (ref 0.1–2)
BUN BLD-MCNC: 21 MG/DL (ref 7–18)
BUN/CREAT SERPL: 27.6 (ref 10–20)
CALCIUM BLD-MCNC: 9 MG/DL (ref 8.5–10.1)
CHLORIDE SERPL-SCNC: 104 MMOL/L (ref 98–112)
CO2 SERPL-SCNC: 32 MMOL/L (ref 21–32)
CREAT BLD-MCNC: 0.76 MG/DL (ref 0.55–1.02)
GLOBULIN PLAS-MCNC: 4.4 G/DL (ref 2.8–4.4)
GLUCOSE BLD-MCNC: 88 MG/DL (ref 70–99)
M PROTEIN MFR SERPL ELPH: 7.7 G/DL (ref 6.4–8.2)
OSMOLALITY SERPL CALC.SUM OF ELEC: 286 MOSM/KG (ref 275–295)
PATIENT FASTING Y/N/NP: NO
POTASSIUM SERPL-SCNC: 3.5 MMOL/L (ref 3.5–5.1)
SODIUM SERPL-SCNC: 137 MMOL/L (ref 136–145)
T3FREE SERPL-MCNC: 2.34 PG/ML (ref 2.4–4.2)
T4 FREE SERPL-MCNC: 1.3 NG/DL (ref 0.8–1.7)
TSI SER-ACNC: 0.64 MIU/ML (ref 0.36–3.74)

## 2020-03-10 PROCEDURE — 80053 COMPREHEN METABOLIC PANEL: CPT | Performed by: NURSE PRACTITIONER

## 2020-03-10 PROCEDURE — 36415 COLL VENOUS BLD VENIPUNCTURE: CPT | Performed by: NURSE PRACTITIONER

## 2020-04-27 RX ORDER — AMLODIPINE BESYLATE 5 MG/1
TABLET ORAL
Qty: 90 TABLET | Refills: 0 | Status: SHIPPED | OUTPATIENT
Start: 2020-04-27 | End: 2020-08-03

## 2020-08-03 RX ORDER — AMLODIPINE BESYLATE 5 MG/1
TABLET ORAL
Qty: 90 TABLET | Refills: 0 | Status: SHIPPED | OUTPATIENT
Start: 2020-08-03 | End: 2020-11-16

## 2020-08-03 NOTE — TELEPHONE ENCOUNTER
Latest RX: AMLODIPINE BESYLATE 5MG TABLETS 90 tabs 0 refills on 4/27/20    Per protocol, passed. Rx sent.

## 2020-10-12 ENCOUNTER — TELEPHONE (OUTPATIENT)
Dept: INTERNAL MEDICINE CLINIC | Facility: CLINIC | Age: 50
End: 2020-10-12

## 2020-10-12 NOTE — TELEPHONE ENCOUNTER
Received fax from [address] [place] [address] with attached labs. Abstracted and placed in SD bin to review.

## 2020-10-13 NOTE — TELEPHONE ENCOUNTER
Pt scheduled on below    Future Appointments   Date Time Provider Glenda Evelyn   10/26/2020  5:00 PM HEENA Greer [facility]

## 2020-10-26 ENCOUNTER — OFFICE VISIT (OUTPATIENT)
Dept: INTERNAL MEDICINE CLINIC | Facility: CLINIC | Age: 50
End: 2020-10-26
Payer: COMMERCIAL

## 2020-10-26 VITALS
DIASTOLIC BLOOD PRESSURE: 82 MMHG | WEIGHT: 256 LBS | TEMPERATURE: 98 F | HEART RATE: 68 BPM | HEIGHT: 61 IN | SYSTOLIC BLOOD PRESSURE: 132 MMHG | BODY MASS INDEX: 48.33 KG/M2

## 2020-10-26 DIAGNOSIS — E05.00 GRAVES DISEASE: ICD-10-CM

## 2020-10-26 DIAGNOSIS — E66.01 CLASS 3 SEVERE OBESITY DUE TO EXCESS CALORIES WITHOUT SERIOUS COMORBIDITY WITH BODY MASS INDEX (BMI) OF 45.0 TO 49.9 IN ADULT (HCC): ICD-10-CM

## 2020-10-26 DIAGNOSIS — E05.90 HYPERTHYROIDISM: ICD-10-CM

## 2020-10-26 DIAGNOSIS — R73.03 PREDIABETES: ICD-10-CM

## 2020-10-26 DIAGNOSIS — Z12.31 ENCOUNTER FOR SCREENING MAMMOGRAM FOR MALIGNANT NEOPLASM OF BREAST: ICD-10-CM

## 2020-10-26 DIAGNOSIS — Z00.00 ROUTINE GENERAL MEDICAL EXAMINATION AT A HEALTH CARE FACILITY: Primary | ICD-10-CM

## 2020-10-26 DIAGNOSIS — I10 ESSENTIAL HYPERTENSION: ICD-10-CM

## 2020-10-26 DIAGNOSIS — R32 URINARY INCONTINENCE, UNSPECIFIED TYPE: ICD-10-CM

## 2020-10-26 PROCEDURE — 99396 PREV VISIT EST AGE 40-64: CPT | Performed by: NURSE PRACTITIONER

## 2020-10-26 PROCEDURE — 3079F DIAST BP 80-89 MM HG: CPT | Performed by: NURSE PRACTITIONER

## 2020-10-26 PROCEDURE — 3008F BODY MASS INDEX DOCD: CPT | Performed by: NURSE PRACTITIONER

## 2020-10-26 PROCEDURE — 3075F SYST BP GE 130 - 139MM HG: CPT | Performed by: NURSE PRACTITIONER

## 2020-10-26 NOTE — PROGRESS NOTES
Judd Rubinstein is a 52year old female who presents for a complete physical exam:       Patient complains of:    Hyperthyroid   Has been doing well  No meds. Weight gain/obesity  Was doing Keto. Her chol is up . She has since stopped.   Was do 150-12.5 MG Oral Tab Take 1 tablet by mouth 2 (two) times daily. 180 tablet 2   • Cetirizine HCl 5 MG Oral Tab Take 5 mg by mouth daily. • Montelukast Sodium (SINGULAIR) 10 MG Oral Tab Take 1 tablet (10 mg total) by mouth daily.  90 tablet 3      Past M SYSTEMS:   GENERAL: feels well otherwise  SKIN: denies any unusual skin lesions  EYES:denies blurred vision or double vision  HEENT: denies nasal congestion, sinus pain or ST  LUNGS: denies shortness of breath with exertion  CARDIOVASCULAR: denies chest pa incontinence, unspecified type  Refer to Dr Mary Contreras. No orders of the defined types were placed in this encounter.       Meds & Refills for this Visit:  Requested Prescriptions      No prescriptions requested or ordered in this encounter       Imaging

## 2020-11-16 RX ORDER — AMLODIPINE BESYLATE 5 MG/1
TABLET ORAL
Qty: 90 TABLET | Refills: 1 | Status: SHIPPED | OUTPATIENT
Start: 2020-11-16 | End: 2021-04-15

## 2020-11-25 ENCOUNTER — HOSPITAL ENCOUNTER (OUTPATIENT)
Dept: MAMMOGRAPHY | Age: 50
Discharge: HOME | End: 2020-11-25
Attending: NURSE PRACTITIONER
Payer: COMMERCIAL

## 2020-11-25 DIAGNOSIS — Z12.31 ENCOUNTER FOR SCREENING MAMMOGRAM FOR MALIGNANT NEOPLASM OF BREAST: ICD-10-CM

## 2020-11-25 PROCEDURE — 77067 SCR MAMMO BI INCL CAD: CPT | Performed by: NURSE PRACTITIONER

## 2020-11-25 PROCEDURE — 77063 BREAST TOMOSYNTHESIS BI: CPT | Performed by: NURSE PRACTITIONER

## 2020-11-27 ENCOUNTER — HOSPITAL ENCOUNTER (OUTPATIENT)
Dept: ULTRASOUND IMAGING | Age: 50
Discharge: HOME | End: 2020-11-27
Attending: NURSE PRACTITIONER
Payer: COMMERCIAL

## 2020-11-27 ENCOUNTER — HOSPITAL ENCOUNTER (OUTPATIENT)
Dept: MAMMOGRAPHY | Age: 50
Discharge: HOME | End: 2020-11-27
Attending: NURSE PRACTITIONER
Payer: COMMERCIAL

## 2020-11-27 DIAGNOSIS — R92.2 INCONCLUSIVE MAMMOGRAM: ICD-10-CM

## 2020-11-27 PROCEDURE — 77061 BREAST TOMOSYNTHESIS UNI: CPT | Performed by: NURSE PRACTITIONER

## 2020-11-27 PROCEDURE — 77065 DX MAMMO INCL CAD UNI: CPT | Performed by: NURSE PRACTITIONER

## 2020-11-27 PROCEDURE — 76642 ULTRASOUND BREAST LIMITED: CPT | Performed by: NURSE PRACTITIONER

## 2021-03-08 DIAGNOSIS — Z23 NEED FOR VACCINATION: ICD-10-CM

## 2021-04-01 RX ORDER — MONTELUKAST SODIUM 10 MG/1
TABLET ORAL
Qty: 90 TABLET | Refills: 3 | Status: SHIPPED | OUTPATIENT
Start: 2021-04-01 | End: 2021-11-10

## 2021-04-01 NOTE — TELEPHONE ENCOUNTER
Last Ov: 10/26/20, SD, CPE  Last labs: 10/7/20 in media  Last Rx: montelukast 10mg, #90, 3R 2/14/21    No future appointments. Per Protocol - failed, pt due for AAP/ACT and last asthma score out of range. Rx pending.

## 2021-04-15 ENCOUNTER — LAB ENCOUNTER (OUTPATIENT)
Dept: LAB | Age: 51
End: 2021-04-15
Attending: NURSE PRACTITIONER
Payer: COMMERCIAL

## 2021-04-15 ENCOUNTER — OFFICE VISIT (OUTPATIENT)
Dept: INTERNAL MEDICINE CLINIC | Facility: CLINIC | Age: 51
End: 2021-04-15
Payer: COMMERCIAL

## 2021-04-15 VITALS
TEMPERATURE: 98 F | RESPIRATION RATE: 16 BRPM | SYSTOLIC BLOOD PRESSURE: 102 MMHG | HEART RATE: 68 BPM | BODY MASS INDEX: 41.54 KG/M2 | DIASTOLIC BLOOD PRESSURE: 70 MMHG | WEIGHT: 220 LBS | HEIGHT: 61 IN

## 2021-04-15 DIAGNOSIS — R22.1 NECK MASS: Primary | ICD-10-CM

## 2021-04-15 DIAGNOSIS — E55.9 VITAMIN D DEFICIENCY: ICD-10-CM

## 2021-04-15 DIAGNOSIS — R53.83 FATIGUE, UNSPECIFIED TYPE: ICD-10-CM

## 2021-04-15 DIAGNOSIS — E05.00 GRAVES DISEASE: ICD-10-CM

## 2021-04-15 DIAGNOSIS — I10 ESSENTIAL HYPERTENSION: ICD-10-CM

## 2021-04-15 DIAGNOSIS — E05.90 HYPERTHYROIDISM: ICD-10-CM

## 2021-04-15 PROCEDURE — 3008F BODY MASS INDEX DOCD: CPT | Performed by: NURSE PRACTITIONER

## 2021-04-15 PROCEDURE — 82607 VITAMIN B-12: CPT | Performed by: NURSE PRACTITIONER

## 2021-04-15 PROCEDURE — 3078F DIAST BP <80 MM HG: CPT | Performed by: NURSE PRACTITIONER

## 2021-04-15 PROCEDURE — 80053 COMPREHEN METABOLIC PANEL: CPT | Performed by: NURSE PRACTITIONER

## 2021-04-15 PROCEDURE — 85025 COMPLETE CBC W/AUTO DIFF WBC: CPT | Performed by: NURSE PRACTITIONER

## 2021-04-15 PROCEDURE — 99214 OFFICE O/P EST MOD 30 MIN: CPT | Performed by: NURSE PRACTITIONER

## 2021-04-15 PROCEDURE — 3074F SYST BP LT 130 MM HG: CPT | Performed by: NURSE PRACTITIONER

## 2021-04-15 PROCEDURE — 82306 VITAMIN D 25 HYDROXY: CPT | Performed by: NURSE PRACTITIONER

## 2021-04-15 PROCEDURE — 36415 COLL VENOUS BLD VENIPUNCTURE: CPT | Performed by: NURSE PRACTITIONER

## 2021-04-15 RX ORDER — AMLODIPINE BESYLATE 5 MG/1
2.5 TABLET ORAL DAILY
Qty: 90 TABLET | Refills: 1 | COMMUNITY
Start: 2021-04-15 | End: 2021-11-10

## 2021-04-15 NOTE — PROGRESS NOTES
Bonny Varela is a 54year old female. Patient presents with:  Lump: SN Rm 11; R side x 1 wk, denies pain  Other: H/O Grave's, has forgetfullness/hotflashes/\"autopilot\" feeling, contacted endo      HPI:   Presents to discuss symptoms.   She is not sure Vaping Use: Never used    Alcohol use: Not Currently      Alcohol/week: 1.0 standard drinks      Types: 1 Standard drinks or equivalent per week    Drug use: No    Family History   Problem Relation Age of Onset   • Other (CAD) Father    • Other (HTN) Mater Placed This Encounter      CBC With Diff      CMP      Vitamin D, 25-Hydroxy [E]      Vitamin B12 [E]      Meds & Refills for this Visit:  Requested Prescriptions      No prescriptions requested or ordered in this encounter       Imaging & Consults:  US HE

## 2021-04-26 ENCOUNTER — TELEPHONE (OUTPATIENT)
Dept: INTERNAL MEDICINE CLINIC | Facility: CLINIC | Age: 51
End: 2021-04-26

## 2021-04-26 NOTE — TELEPHONE ENCOUNTER
SD already commented on; record is in computer. See result note from US head/neck via [address] [place] [address] Rd.

## 2021-05-23 ENCOUNTER — WALK IN (OUTPATIENT)
Dept: URGENT CARE | Age: 51
End: 2021-05-23

## 2021-05-23 VITALS
TEMPERATURE: 98.6 F | WEIGHT: 213 LBS | DIASTOLIC BLOOD PRESSURE: 72 MMHG | BODY MASS INDEX: 40.22 KG/M2 | RESPIRATION RATE: 16 BRPM | OXYGEN SATURATION: 98 % | HEART RATE: 78 BPM | HEIGHT: 61 IN | SYSTOLIC BLOOD PRESSURE: 110 MMHG

## 2021-05-23 DIAGNOSIS — J01.00 ACUTE NON-RECURRENT MAXILLARY SINUSITIS: Primary | ICD-10-CM

## 2021-05-23 PROCEDURE — 99213 OFFICE O/P EST LOW 20 MIN: CPT | Performed by: NURSE PRACTITIONER

## 2021-05-23 RX ORDER — CEFDINIR 300 MG/1
300 CAPSULE ORAL 2 TIMES DAILY
Qty: 20 CAPSULE | Refills: 0 | Status: SHIPPED | OUTPATIENT
Start: 2021-05-23 | End: 2021-06-02

## 2021-05-23 RX ORDER — FLUTICASONE PROPIONATE 50 MCG
2 SPRAY, SUSPENSION (ML) NASAL DAILY
Qty: 1 BOTTLE | Refills: 0 | Status: SHIPPED | OUTPATIENT
Start: 2021-05-23 | End: 2022-12-20 | Stop reason: ALTCHOICE

## 2021-06-16 NOTE — ANESTHESIA PREPROCEDURE EVALUATION
PRE-OP EVALUATION    Patient Name: Brian Lopez    Pre-op Diagnosis: UTERINE FIBROIDS    Procedure(s):  TOTAL ABDOMINAL HYSTERECTOMY    Surgeon(s) and Role:     Harriet Panda MD - Primary     * Radha Fontaine - Assisting Surgeon    Pre-op vitals Smoking status: Never Smoker     Smokeless tobacco: Never Used    Alcohol Use: Yes  0.0 oz/week    0 Standard drinks or equivalent per week         Comment: social       Drug Use: No     Available pre-op labs reviewed.     Lab Results  Component Valu Secondary Intention Text (Leave Blank If You Do Not Want): The defect will heal with secondary intention.

## 2021-08-15 DIAGNOSIS — I10 ESSENTIAL HYPERTENSION: ICD-10-CM

## 2021-08-16 DIAGNOSIS — I10 ESSENTIAL HYPERTENSION: ICD-10-CM

## 2021-08-17 RX ORDER — IRBESARTAN AND HYDROCHLOROTHIAZIDE 150; 12.5 MG/1; MG/1
TABLET, FILM COATED ORAL
Qty: 180 TABLET | Refills: 0 | Status: SHIPPED | OUTPATIENT
Start: 2021-08-17 | End: 2022-03-04

## 2021-08-17 NOTE — TELEPHONE ENCOUNTER
Please call pt to schedule soon coming due for annual  Last PE: 10/26/20    No future appointments. Per protocol passed.  Rx sent

## 2021-08-18 RX ORDER — IRBESARTAN AND HYDROCHLOROTHIAZIDE 150; 12.5 MG/1; MG/1
TABLET, FILM COATED ORAL
Qty: 180 TABLET | Refills: 2 | OUTPATIENT
Start: 2021-08-18

## 2021-10-25 ENCOUNTER — TELEPHONE (OUTPATIENT)
Dept: INTERNAL MEDICINE CLINIC | Facility: CLINIC | Age: 51
End: 2021-10-25

## 2021-10-25 DIAGNOSIS — Z13.220 SCREENING, LIPID: ICD-10-CM

## 2021-10-25 DIAGNOSIS — Z13.29 SCREENING FOR THYROID DISORDER: ICD-10-CM

## 2021-10-25 DIAGNOSIS — Z13.0 SCREENING FOR ENDOCRINE, METABOLIC AND IMMUNITY DISORDER: ICD-10-CM

## 2021-10-25 DIAGNOSIS — E55.9 VITAMIN D DEFICIENCY: ICD-10-CM

## 2021-10-25 DIAGNOSIS — Z13.29 SCREENING FOR ENDOCRINE, METABOLIC AND IMMUNITY DISORDER: ICD-10-CM

## 2021-10-25 DIAGNOSIS — Z13.228 SCREENING FOR ENDOCRINE, METABOLIC AND IMMUNITY DISORDER: ICD-10-CM

## 2021-10-25 DIAGNOSIS — Z00.00 ROUTINE GENERAL MEDICAL EXAMINATION AT A HEALTH CARE FACILITY: Primary | ICD-10-CM

## 2021-10-25 DIAGNOSIS — Z13.0 SCREENING FOR DISORDER OF BLOOD AND BLOOD-FORMING ORGANS: ICD-10-CM

## 2021-10-25 NOTE — TELEPHONE ENCOUNTER
PT recent labs 4/15/21 TSH, CBC, CMP. Lipid order placed. Would you like the pt to get labs done to reflect more recent results?

## 2021-10-25 NOTE — TELEPHONE ENCOUNTER
Orders to THE [facility] Pt aware to get labs done no sooner than 2 weeks prior to the appt. Pt aware to fast.  No call back required.   Future Appointments   Date Time Provider Glenda Rivas   11/22/2021  4:20 PM HEENA Gamboa [facility]

## 2021-11-02 ENCOUNTER — HOSPITAL ENCOUNTER (OUTPATIENT)
Dept: GENERAL RADIOLOGY | Age: 51
Discharge: HOME | End: 2021-11-02
Attending: PHYSICIAN ASSISTANT
Payer: COMMERCIAL

## 2021-11-02 ENCOUNTER — TELEPHONE (OUTPATIENT)
Dept: ORTHOPEDICS CLINIC | Facility: CLINIC | Age: 51
End: 2021-11-02

## 2021-11-02 DIAGNOSIS — M25.562 LEFT KNEE PAIN, UNSPECIFIED CHRONICITY: Primary | ICD-10-CM

## 2021-11-02 DIAGNOSIS — Z01.89 ENCOUNTER FOR LOWER EXTREMITY COMPARISON IMAGING STUDY: ICD-10-CM

## 2021-11-02 DIAGNOSIS — M25.562 LEFT KNEE PAIN, UNSPECIFIED CHRONICITY: ICD-10-CM

## 2021-11-02 PROCEDURE — 73564 X-RAY EXAM KNEE 4 OR MORE: CPT | Performed by: PHYSICIAN ASSISTANT

## 2021-11-02 PROCEDURE — 73562 X-RAY EXAM OF KNEE 3: CPT | Performed by: PHYSICIAN ASSISTANT

## 2021-11-02 NOTE — TELEPHONE ENCOUNTER
Reviewed patients chart, xray orders are required. Order placed for left knee xrays  Please contact patient advise to arrive 30 mins prior to patients appt to complete x-ray order.    Please schedule patients xray appt-Thank you

## 2021-11-02 NOTE — TELEPHONE ENCOUNTER
Patient is scheduled on 11/10/2021 with Payal Khan for left knee pain. Please advise if imaging is needed.

## 2021-11-10 ENCOUNTER — OFFICE VISIT (OUTPATIENT)
Dept: ORTHOPEDICS CLINIC | Facility: CLINIC | Age: 51
End: 2021-11-10
Payer: COMMERCIAL

## 2021-11-10 VITALS — BODY MASS INDEX: 39.07 KG/M2 | HEIGHT: 60 IN | WEIGHT: 199 LBS

## 2021-11-10 DIAGNOSIS — M17.12 PRIMARY OSTEOARTHRITIS OF LEFT KNEE: Primary | ICD-10-CM

## 2021-11-10 PROCEDURE — 20610 DRAIN/INJ JOINT/BURSA W/O US: CPT | Performed by: PHYSICIAN ASSISTANT

## 2021-11-10 PROCEDURE — 3008F BODY MASS INDEX DOCD: CPT | Performed by: PHYSICIAN ASSISTANT

## 2021-11-10 PROCEDURE — 99203 OFFICE O/P NEW LOW 30 MIN: CPT | Performed by: PHYSICIAN ASSISTANT

## 2021-11-10 RX ORDER — TRIAMCINOLONE ACETONIDE 40 MG/ML
40 INJECTION, SUSPENSION INTRA-ARTICULAR; INTRAMUSCULAR ONCE
Status: COMPLETED | OUTPATIENT
Start: 2021-11-10 | End: 2021-11-10

## 2021-11-10 RX ADMIN — TRIAMCINOLONE ACETONIDE 40 MG: 40 INJECTION, SUSPENSION INTRA-ARTICULAR; INTRAMUSCULAR at 09:32:00

## 2021-11-10 NOTE — PROGRESS NOTES
[facility] New Patient Note    CC: Left knee pain    HPI: This 54year old female presents today with complaints of left knee pain. Onset of symptoms started approximately 2 months ago with no known injury.   She states that she woke up in the knee a on file    Tobacco Use      Smoking status: Never Smoker      Smokeless tobacco: Never Used    Vaping Use      Vaping Use: Never used    Substance and Sexual Activity      Alcohol use: Not Currently        Alcohol/week: 1.0 standard drink        Types: 1 S chondromalacia and meniscus pathology that would require surgical intervention.   Due to her active lifestyle and workouts in her desire to return to these as soon as possible, she would like to go ahead with MRI scan as well to evaluate for internal derang

## 2021-11-11 ENCOUNTER — HOSPITAL ENCOUNTER (OUTPATIENT)
Dept: MRI IMAGING | Facility: HOSPITAL | Age: 51
Discharge: HOME | End: 2021-11-11
Attending: PHYSICIAN ASSISTANT
Payer: COMMERCIAL

## 2021-11-11 DIAGNOSIS — M17.12 PRIMARY OSTEOARTHRITIS OF LEFT KNEE: ICD-10-CM

## 2021-11-11 PROCEDURE — 73721 MRI JNT OF LWR EXTRE W/O DYE: CPT | Performed by: PHYSICIAN ASSISTANT

## 2021-11-20 ENCOUNTER — LAB ENCOUNTER (OUTPATIENT)
Dept: LAB | Age: 51
End: 2021-11-20
Attending: NURSE PRACTITIONER
Payer: COMMERCIAL

## 2021-11-20 DIAGNOSIS — Z00.00 ROUTINE GENERAL MEDICAL EXAMINATION AT A HEALTH CARE FACILITY: ICD-10-CM

## 2021-11-20 DIAGNOSIS — E55.9 VITAMIN D DEFICIENCY: ICD-10-CM

## 2021-11-20 DIAGNOSIS — Z13.0 SCREENING FOR ENDOCRINE, METABOLIC AND IMMUNITY DISORDER: ICD-10-CM

## 2021-11-20 DIAGNOSIS — Z13.228 SCREENING FOR ENDOCRINE, METABOLIC AND IMMUNITY DISORDER: ICD-10-CM

## 2021-11-20 DIAGNOSIS — Z13.220 SCREENING, LIPID: ICD-10-CM

## 2021-11-20 DIAGNOSIS — Z13.29 SCREENING FOR ENDOCRINE, METABOLIC AND IMMUNITY DISORDER: ICD-10-CM

## 2021-11-20 PROCEDURE — 80061 LIPID PANEL: CPT | Performed by: NURSE PRACTITIONER

## 2021-11-20 PROCEDURE — 80053 COMPREHEN METABOLIC PANEL: CPT | Performed by: NURSE PRACTITIONER

## 2021-11-20 PROCEDURE — 82306 VITAMIN D 25 HYDROXY: CPT | Performed by: NURSE PRACTITIONER

## 2021-11-22 ENCOUNTER — OFFICE VISIT (OUTPATIENT)
Dept: INTERNAL MEDICINE CLINIC | Facility: CLINIC | Age: 51
End: 2021-11-22
Payer: COMMERCIAL

## 2021-11-22 VITALS
DIASTOLIC BLOOD PRESSURE: 68 MMHG | HEIGHT: 60 IN | HEART RATE: 73 BPM | SYSTOLIC BLOOD PRESSURE: 116 MMHG | BODY MASS INDEX: 38.68 KG/M2 | WEIGHT: 197 LBS | RESPIRATION RATE: 18 BRPM

## 2021-11-22 DIAGNOSIS — Z90.710 S/P HYSTERECTOMY: ICD-10-CM

## 2021-11-22 DIAGNOSIS — E05.90 HYPERTHYROIDISM: ICD-10-CM

## 2021-11-22 DIAGNOSIS — E66.01 MORBID OBESITY WITH BMI OF 45.0-49.9, ADULT (HCC): ICD-10-CM

## 2021-11-22 DIAGNOSIS — Z12.31 ENCOUNTER FOR SCREENING MAMMOGRAM FOR MALIGNANT NEOPLASM OF BREAST: ICD-10-CM

## 2021-11-22 DIAGNOSIS — Z00.00 ROUTINE GENERAL MEDICAL EXAMINATION AT A HEALTH CARE FACILITY: Primary | ICD-10-CM

## 2021-11-22 DIAGNOSIS — R73.03 PREDIABETES: ICD-10-CM

## 2021-11-22 DIAGNOSIS — M17.12 PRIMARY OSTEOARTHRITIS OF LEFT KNEE: ICD-10-CM

## 2021-11-22 DIAGNOSIS — E78.00 ELEVATED CHOLESTEROL: ICD-10-CM

## 2021-11-22 DIAGNOSIS — I10 ESSENTIAL HYPERTENSION: ICD-10-CM

## 2021-11-22 PROCEDURE — 3008F BODY MASS INDEX DOCD: CPT | Performed by: NURSE PRACTITIONER

## 2021-11-22 PROCEDURE — 99396 PREV VISIT EST AGE 40-64: CPT | Performed by: NURSE PRACTITIONER

## 2021-11-22 PROCEDURE — 3078F DIAST BP <80 MM HG: CPT | Performed by: NURSE PRACTITIONER

## 2021-11-22 PROCEDURE — 3074F SYST BP LT 130 MM HG: CPT | Performed by: NURSE PRACTITIONER

## 2021-11-22 NOTE — PROGRESS NOTES
Brian Lopez is a 54year old female who presents for a complete physical exam:       Patient complains of:    Hyperthyroid/Graves  Dr Klaus Hatch   She has been in remission for 18 mo. HTN  Irbesartan /12.5mg    Her bp is stable    Dyslipidemia. with BMI of 45.0-49.9, adult ([facility] [address]    • PONV (postoperative nausea and vomiting)    • Unspecified essential hypertension 2012   • Visual impairment     contacts and glasses      Past Surgical History:   Procedure Laterality Date   •       x3 itching  MUSCULOSKELETAL: denies back pain  NEURO: denies headaches  PSYCH: no c/o      EXAM:   /68 (BP Location: Left arm, Patient Position: Sitting, Cuff Size: adult)   Pulse 73   Resp 18   Ht 5' (1.524 m)   Wt 197 lb (89.4 kg)   LMP 01/23/2017 (LM Continue Diet, exercise, weight loss and lifestyle modification.       Orders Placed This Encounter      TSH and Free T4      Lipid Panel      Meds & Refills for this Visit:  Requested Prescriptions      No prescriptions requested or ordered in this encount

## 2021-11-24 ENCOUNTER — OFFICE VISIT (OUTPATIENT)
Dept: ORTHOPEDICS CLINIC | Facility: CLINIC | Age: 51
End: 2021-11-24
Payer: COMMERCIAL

## 2021-11-24 VITALS — WEIGHT: 190 LBS | BODY MASS INDEX: 37.3 KG/M2 | HEIGHT: 60 IN

## 2021-11-24 DIAGNOSIS — M17.12 PRIMARY OSTEOARTHRITIS OF LEFT KNEE: Primary | ICD-10-CM

## 2021-11-24 PROCEDURE — 3008F BODY MASS INDEX DOCD: CPT | Performed by: PHYSICIAN ASSISTANT

## 2021-11-24 PROCEDURE — 99212 OFFICE O/P EST SF 10 MIN: CPT | Performed by: PHYSICIAN ASSISTANT

## 2021-12-13 ENCOUNTER — HOSPITAL ENCOUNTER (OUTPATIENT)
Dept: MAMMOGRAPHY | Age: 51
Discharge: HOME | End: 2021-12-13
Attending: NURSE PRACTITIONER
Payer: COMMERCIAL

## 2021-12-13 DIAGNOSIS — Z12.31 ENCOUNTER FOR SCREENING MAMMOGRAM FOR MALIGNANT NEOPLASM OF BREAST: ICD-10-CM

## 2021-12-13 PROCEDURE — 77063 BREAST TOMOSYNTHESIS BI: CPT | Performed by: NURSE PRACTITIONER

## 2021-12-13 PROCEDURE — 77067 SCR MAMMO BI INCL CAD: CPT | Performed by: NURSE PRACTITIONER

## 2022-03-04 NOTE — TELEPHONE ENCOUNTER
Last OV: 11/22/21 annual PE    Future Appointments   Date Time Provider Glenda Rivas   3/30/2022  4:00 PM Ying Rush MD [facility]        Latest labs: 11/20/21 Vit D, CMP and Lipid.  11/22/21 Lipid and TSH    Latest RX: Irbesartan- 180 tabs 0 refills on 8/17/21    Per protocol,

## 2022-03-05 RX ORDER — IRBESARTAN AND HYDROCHLOROTHIAZIDE 150; 12.5 MG/1; MG/1
1 TABLET, FILM COATED ORAL 2 TIMES DAILY
Qty: 180 TABLET | Refills: 2 | Status: SHIPPED | OUTPATIENT
Start: 2022-03-05 | End: 2022-03-08

## 2022-03-08 ENCOUNTER — TELEPHONE (OUTPATIENT)
Dept: INTERNAL MEDICINE CLINIC | Facility: CLINIC | Age: 52
End: 2022-03-08

## 2022-03-08 RX ORDER — IRBESARTAN AND HYDROCHLOROTHIAZIDE 150; 12.5 MG/1; MG/1
1 TABLET, FILM COATED ORAL 2 TIMES DAILY
Qty: 60 TABLET | Refills: 0 | Status: SHIPPED | OUTPATIENT
Start: 2022-03-08 | End: 2022-03-10

## 2022-03-08 NOTE — TELEPHONE ENCOUNTER
Medication was sent to mail order on 3/5/22 for 180 tabs 2 refills. Pt need script sent to local to hold over till mail order comes.

## 2022-03-10 RX ORDER — IRBESARTAN AND HYDROCHLOROTHIAZIDE 150; 12.5 MG/1; MG/1
TABLET, FILM COATED ORAL
Qty: 180 TABLET | Refills: 0 | Status: SHIPPED | OUTPATIENT
Start: 2022-03-10

## 2022-03-10 NOTE — TELEPHONE ENCOUNTER
Last visit- 11/22/2021 cpe     Last refill- 03/08/2022 irbesartan-hydrochlorothiazide 150-12.5mg QTY60 0R    Last labs- 11/20/2021 vitamin d, cmp, lipid  Future Appointments   Date Time Provider Glenda Rivas   3/30/2022  4:00 PM Qiana Thomas MD [facility]       Per Protocol- Passed

## 2022-03-30 ENCOUNTER — HOSPITAL ENCOUNTER (OUTPATIENT)
Dept: GENERAL RADIOLOGY | Age: 52
Discharge: HOME | End: 2022-03-30
Attending: ORTHOPAEDIC SURGERY
Payer: COMMERCIAL

## 2022-03-30 ENCOUNTER — OFFICE VISIT (OUTPATIENT)
Dept: ORTHOPEDICS CLINIC | Facility: CLINIC | Age: 52
End: 2022-03-30
Payer: COMMERCIAL

## 2022-03-30 VITALS — WEIGHT: 190 LBS | HEIGHT: 60 IN | BODY MASS INDEX: 37.3 KG/M2

## 2022-03-30 DIAGNOSIS — M75.42 SHOULDER IMPINGEMENT SYNDROME, LEFT: Primary | ICD-10-CM

## 2022-03-30 DIAGNOSIS — M94.262 CHONDROMALACIA OF LEFT KNEE: ICD-10-CM

## 2022-03-30 DIAGNOSIS — M23.307 DEGENERATIVE TEAR OF MENISCUS OF LEFT KNEE: ICD-10-CM

## 2022-03-30 DIAGNOSIS — M75.42 SHOULDER IMPINGEMENT SYNDROME, LEFT: ICD-10-CM

## 2022-03-30 PROCEDURE — 99214 OFFICE O/P EST MOD 30 MIN: CPT | Performed by: ORTHOPAEDIC SURGERY

## 2022-03-30 PROCEDURE — 73030 X-RAY EXAM OF SHOULDER: CPT | Performed by: ORTHOPAEDIC SURGERY

## 2022-03-30 PROCEDURE — 3008F BODY MASS INDEX DOCD: CPT | Performed by: ORTHOPAEDIC SURGERY

## 2022-03-30 RX ORDER — TRIAMCINOLONE ACETONIDE 40 MG/ML
40 INJECTION, SUSPENSION INTRA-ARTICULAR; INTRAMUSCULAR ONCE
Status: CANCELLED | OUTPATIENT
Start: 2022-03-30 | End: 2022-03-30

## 2022-03-31 ENCOUNTER — LAB ENCOUNTER (OUTPATIENT)
Dept: LAB | Age: 52
End: 2022-03-31
Attending: INTERNAL MEDICINE
Payer: COMMERCIAL

## 2022-03-31 DIAGNOSIS — E05.90 HYPERTHYROIDISM: ICD-10-CM

## 2022-03-31 DIAGNOSIS — E78.00 ELEVATED CHOLESTEROL: ICD-10-CM

## 2022-03-31 LAB
T4 FREE SERPL-MCNC: 1.3 NG/DL (ref 0.8–1.7)
TSI SER-ACNC: 0.26 MIU/ML (ref 0.36–3.74)

## 2022-03-31 PROCEDURE — 36415 COLL VENOUS BLD VENIPUNCTURE: CPT

## 2022-03-31 PROCEDURE — 84443 ASSAY THYROID STIM HORMONE: CPT

## 2022-03-31 PROCEDURE — 84439 ASSAY OF FREE THYROXINE: CPT

## 2022-04-01 NOTE — PROGRESS NOTES
Telephone Information:  Home Phone      [phone]  Mobile          [phone]    Patient advised and scheduled with Trish Westfall in [place]

## 2022-04-02 ENCOUNTER — HOSPITAL ENCOUNTER (OUTPATIENT)
Dept: MRI IMAGING | Age: 52
Discharge: HOME | End: 2022-04-02
Attending: ORTHOPAEDIC SURGERY
Payer: COMMERCIAL

## 2022-04-02 ENCOUNTER — LAB ENCOUNTER (OUTPATIENT)
Dept: LAB | Age: 52
End: 2022-04-02
Attending: NURSE PRACTITIONER
Payer: COMMERCIAL

## 2022-04-02 DIAGNOSIS — M75.42 SHOULDER IMPINGEMENT SYNDROME, LEFT: ICD-10-CM

## 2022-04-02 DIAGNOSIS — E78.00 ELEVATED CHOLESTEROL: ICD-10-CM

## 2022-04-02 LAB
CHOLEST SERPL-MCNC: 213 MG/DL (ref ?–200)
FASTING PATIENT LIPID ANSWER: YES
HDLC SERPL-MCNC: 76 MG/DL (ref 40–59)
LDLC SERPL CALC-MCNC: 125 MG/DL (ref ?–100)
NONHDLC SERPL-MCNC: 137 MG/DL (ref ?–130)
TRIGL SERPL-MCNC: 68 MG/DL (ref 30–149)
VLDLC SERPL CALC-MCNC: 12 MG/DL (ref 0–30)

## 2022-04-02 PROCEDURE — 73221 MRI JOINT UPR EXTREM W/O DYE: CPT | Performed by: ORTHOPAEDIC SURGERY

## 2022-04-02 PROCEDURE — 80061 LIPID PANEL: CPT

## 2022-04-02 PROCEDURE — 36415 COLL VENOUS BLD VENIPUNCTURE: CPT

## 2022-04-26 ENCOUNTER — OFFICE VISIT (OUTPATIENT)
Dept: ORTHOPEDICS CLINIC | Facility: CLINIC | Age: 52
End: 2022-04-26
Payer: COMMERCIAL

## 2022-04-26 VITALS — HEIGHT: 60 IN | WEIGHT: 190 LBS | BODY MASS INDEX: 37.3 KG/M2

## 2022-04-26 DIAGNOSIS — M75.42 SHOULDER IMPINGEMENT SYNDROME, LEFT: Primary | ICD-10-CM

## 2022-04-26 PROCEDURE — 20610 DRAIN/INJ JOINT/BURSA W/O US: CPT | Performed by: ORTHOPAEDIC SURGERY

## 2022-04-26 PROCEDURE — 3008F BODY MASS INDEX DOCD: CPT | Performed by: ORTHOPAEDIC SURGERY

## 2022-04-26 PROCEDURE — 99213 OFFICE O/P EST LOW 20 MIN: CPT | Performed by: ORTHOPAEDIC SURGERY

## 2022-04-26 RX ORDER — TRIAMCINOLONE ACETONIDE 40 MG/ML
40 INJECTION, SUSPENSION INTRA-ARTICULAR; INTRAMUSCULAR ONCE
Status: COMPLETED | OUTPATIENT
Start: 2022-04-26 | End: 2022-04-26

## 2022-04-26 RX ADMIN — TRIAMCINOLONE ACETONIDE 40 MG: 40 INJECTION, SUSPENSION INTRA-ARTICULAR; INTRAMUSCULAR at 12:13:00

## 2022-04-26 NOTE — PROGRESS NOTES
Patient is a 73-year-old black female here for follow-up on her left shoulder. Patient had the MRI scan performed which I reviewed with her. The report indicates that there is no rotator cuff tear. Patient's exam shows a positive impingement sign. She has good strength of abduction. Range of motion near full. Impression is that of impingement syndrome of the left shoulder. Recommendation is to go ahead with a cortisone injection in the bursa which was done through a lateral approach under sterile technique with 3 cc Xylocaine and Marcaine and 40 mg of Kenalog. She tolerated the injection well. We will follow-up with her as needed. If her symptoms recur we can consider repeating the injection in around 3 months time. If symptoms continue to recur she might need to consider an arthroscopy of the shoulder.

## 2022-10-23 ENCOUNTER — HOSPITAL ENCOUNTER (INPATIENT)
Facility: HOSPITAL | Age: 52
LOS: 2 days | Discharge: HOME | End: 2022-10-25
Attending: EMERGENCY MEDICINE | Admitting: HOSPITALIST
Payer: COMMERCIAL

## 2022-10-23 ENCOUNTER — APPOINTMENT (OUTPATIENT)
Dept: GENERAL RADIOLOGY | Facility: HOSPITAL | Age: 52
End: 2022-10-23
Attending: EMERGENCY MEDICINE
Payer: COMMERCIAL

## 2022-10-23 ENCOUNTER — APPOINTMENT (OUTPATIENT)
Dept: CT IMAGING | Facility: HOSPITAL | Age: 52
End: 2022-10-23
Attending: EMERGENCY MEDICINE
Payer: COMMERCIAL

## 2022-10-23 DIAGNOSIS — I26.99 OTHER ACUTE PULMONARY EMBOLISM WITHOUT ACUTE COR PULMONALE (HCC): ICD-10-CM

## 2022-10-23 DIAGNOSIS — R77.8 ELEVATED TROPONIN: ICD-10-CM

## 2022-10-23 DIAGNOSIS — E05.90 HYPERTHYROIDISM: ICD-10-CM

## 2022-10-23 DIAGNOSIS — R07.89 CHEST PAIN, ATYPICAL: Primary | ICD-10-CM

## 2022-10-23 DIAGNOSIS — R94.6 ABNORMAL THYROID FUNCTION TEST: ICD-10-CM

## 2022-10-23 PROBLEM — E87.6 HYPOKALEMIA: Status: ACTIVE | Noted: 2022-10-23

## 2022-10-23 PROBLEM — R79.89 ELEVATED TROPONIN: Status: ACTIVE | Noted: 2022-10-23

## 2022-10-23 PROBLEM — R79.89 AZOTEMIA: Status: ACTIVE | Noted: 2022-10-23

## 2022-10-23 LAB
ALBUMIN SERPL-MCNC: 3.2 G/DL (ref 3.4–5)
ALBUMIN/GLOB SERPL: 0.8 {RATIO} (ref 1–2)
ALP LIVER SERPL-CCNC: 68 U/L
ALT SERPL-CCNC: 16 U/L
ANION GAP SERPL CALC-SCNC: 3 MMOL/L (ref 0–18)
ANION GAP SERPL CALC-SCNC: 4 MMOL/L (ref 0–18)
APTT PPP: 149.2 SECONDS (ref 23.3–35.6)
APTT PPP: 27.2 SECONDS (ref 23.3–35.6)
AST SERPL-CCNC: 22 U/L (ref 15–37)
ATRIAL RATE: 105 BPM
BASOPHILS # BLD AUTO: 0.01 X10(3) UL (ref 0–0.2)
BASOPHILS NFR BLD AUTO: 0.2 %
BILIRUB SERPL-MCNC: 1.3 MG/DL (ref 0.1–2)
BUN BLD-MCNC: 25 MG/DL (ref 7–18)
BUN BLD-MCNC: 26 MG/DL (ref 7–18)
CALCIUM BLD-MCNC: 9.2 MG/DL (ref 8.5–10.1)
CALCIUM BLD-MCNC: 9.5 MG/DL (ref 8.5–10.1)
CHLORIDE SERPL-SCNC: 107 MMOL/L (ref 98–112)
CHLORIDE SERPL-SCNC: 107 MMOL/L (ref 98–112)
CHOLEST SERPL-MCNC: 129 MG/DL (ref ?–200)
CO2 SERPL-SCNC: 28 MMOL/L (ref 21–32)
CO2 SERPL-SCNC: 31 MMOL/L (ref 21–32)
CREAT BLD-MCNC: 0.53 MG/DL
CREAT BLD-MCNC: 0.57 MG/DL
D DIMER PPP FEU-MCNC: 0.59 UG/ML FEU (ref ?–0.5)
EOSINOPHIL # BLD AUTO: 0.13 X10(3) UL (ref 0–0.7)
EOSINOPHIL NFR BLD AUTO: 2.8 %
ERYTHROCYTE [DISTWIDTH] IN BLOOD BY AUTOMATED COUNT: 11.9 %
ERYTHROCYTE [DISTWIDTH] IN BLOOD BY AUTOMATED COUNT: 11.9 %
GFR SERPLBLD BASED ON 1.73 SQ M-ARVRAT: 111 ML/MIN/1.73M2 (ref 60–?)
GFR SERPLBLD BASED ON 1.73 SQ M-ARVRAT: 113 ML/MIN/1.73M2 (ref 60–?)
GLOBULIN PLAS-MCNC: 3.9 G/DL (ref 2.8–4.4)
GLUCOSE BLD-MCNC: 108 MG/DL (ref 70–99)
GLUCOSE BLD-MCNC: 173 MG/DL (ref 70–99)
HCT VFR BLD AUTO: 36.9 %
HCT VFR BLD AUTO: 37.8 %
HDLC SERPL-MCNC: 55 MG/DL (ref 40–59)
HGB BLD-MCNC: 12.6 G/DL
HGB BLD-MCNC: 13.1 G/DL
IMM GRANULOCYTES # BLD AUTO: 0.01 X10(3) UL (ref 0–1)
IMM GRANULOCYTES NFR BLD: 0.2 %
INR BLD: 1 (ref 0.85–1.16)
LDLC SERPL CALC-MCNC: 54 MG/DL (ref ?–100)
LYMPHOCYTES # BLD AUTO: 1.9 X10(3) UL (ref 1–4)
LYMPHOCYTES NFR BLD AUTO: 40.3 %
MCH RBC QN AUTO: 30 PG (ref 26–34)
MCH RBC QN AUTO: 30.1 PG (ref 26–34)
MCHC RBC AUTO-ENTMCNC: 34.1 G/DL (ref 31–37)
MCHC RBC AUTO-ENTMCNC: 34.7 G/DL (ref 31–37)
MCV RBC AUTO: 86.7 FL
MCV RBC AUTO: 88.3 FL
MONOCYTES # BLD AUTO: 0.72 X10(3) UL (ref 0.1–1)
MONOCYTES NFR BLD AUTO: 15.3 %
NEUTROPHILS # BLD AUTO: 1.95 X10 (3) UL (ref 1.5–7.7)
NEUTROPHILS # BLD AUTO: 1.95 X10(3) UL (ref 1.5–7.7)
NEUTROPHILS NFR BLD AUTO: 41.2 %
NONHDLC SERPL-MCNC: 74 MG/DL (ref ?–130)
OSMOLALITY SERPL CALC.SUM OF ELEC: 293 MOSM/KG (ref 275–295)
OSMOLALITY SERPL CALC.SUM OF ELEC: 301 MOSM/KG (ref 275–295)
P AXIS: 67 DEGREES
P-R INTERVAL: 190 MS
PLATELET # BLD AUTO: 184 10(3)UL (ref 150–450)
PLATELET # BLD AUTO: 188 10(3)UL (ref 150–450)
POTASSIUM SERPL-SCNC: 3.4 MMOL/L (ref 3.5–5.1)
POTASSIUM SERPL-SCNC: 3.7 MMOL/L (ref 3.5–5.1)
PROT SERPL-MCNC: 7.1 G/DL (ref 6.4–8.2)
PROTHROMBIN TIME: 13.2 SECONDS (ref 11.6–14.8)
Q-T INTERVAL: 344 MS
QRS DURATION: 90 MS
QTC CALCULATION (BEZET): 454 MS
R AXIS: -6 DEGREES
RBC # BLD AUTO: 4.18 X10(6)UL
RBC # BLD AUTO: 4.36 X10(6)UL
SARS-COV-2 RNA RESP QL NAA+PROBE: NOT DETECTED
SODIUM SERPL-SCNC: 139 MMOL/L (ref 136–145)
SODIUM SERPL-SCNC: 141 MMOL/L (ref 136–145)
T AXIS: 48 DEGREES
T4 FREE SERPL-MCNC: 7.3 NG/DL (ref 0.8–1.7)
TRIGL SERPL-MCNC: 112 MG/DL (ref 30–149)
TROPONIN I HIGH SENSITIVITY: 71 NG/L
TSI SER-ACNC: <0.005 MIU/ML (ref 0.36–3.74)
VENTRICULAR RATE: 105 BPM
VLDLC SERPL CALC-MCNC: 16 MG/DL (ref 0–30)
WBC # BLD AUTO: 4.7 X10(3) UL (ref 4–11)
WBC # BLD AUTO: 5.7 X10(3) UL (ref 4–11)

## 2022-10-23 PROCEDURE — 71045 X-RAY EXAM CHEST 1 VIEW: CPT | Performed by: EMERGENCY MEDICINE

## 2022-10-23 PROCEDURE — 71275 CT ANGIOGRAPHY CHEST: CPT | Performed by: EMERGENCY MEDICINE

## 2022-10-23 PROCEDURE — 99223 1ST HOSP IP/OBS HIGH 75: CPT | Performed by: INTERNAL MEDICINE

## 2022-10-23 RX ORDER — HEPARIN SODIUM AND DEXTROSE 10000; 5 [USP'U]/100ML; G/100ML
18 INJECTION INTRAVENOUS ONCE
Status: COMPLETED | OUTPATIENT
Start: 2022-10-23 | End: 2022-10-23

## 2022-10-23 RX ORDER — SODIUM CHLORIDE 9 MG/ML
INJECTION, SOLUTION INTRAVENOUS CONTINUOUS
Status: ACTIVE | OUTPATIENT
Start: 2022-10-23 | End: 2022-10-23

## 2022-10-23 RX ORDER — PROCHLORPERAZINE EDISYLATE 5 MG/ML
5 INJECTION INTRAMUSCULAR; INTRAVENOUS EVERY 8 HOURS PRN
Status: DISCONTINUED | OUTPATIENT
Start: 2022-10-23 | End: 2022-10-25

## 2022-10-23 RX ORDER — IOHEXOL 350 MG/ML
100 INJECTION, SOLUTION INTRAVENOUS
Status: COMPLETED | OUTPATIENT
Start: 2022-10-23 | End: 2022-10-23

## 2022-10-23 RX ORDER — HEPARIN SODIUM 1000 [USP'U]/ML
80 INJECTION, SOLUTION INTRAVENOUS; SUBCUTANEOUS ONCE
Status: COMPLETED | OUTPATIENT
Start: 2022-10-23 | End: 2022-10-23

## 2022-10-23 RX ORDER — ONDANSETRON 2 MG/ML
4 INJECTION INTRAMUSCULAR; INTRAVENOUS EVERY 6 HOURS PRN
Status: DISCONTINUED | OUTPATIENT
Start: 2022-10-23 | End: 2022-10-25

## 2022-10-23 RX ORDER — ASPIRIN 81 MG/1
324 TABLET, CHEWABLE ORAL ONCE
Status: COMPLETED | OUTPATIENT
Start: 2022-10-23 | End: 2022-10-23

## 2022-10-23 RX ORDER — ACETAMINOPHEN 500 MG
500 TABLET ORAL EVERY 4 HOURS PRN
Status: DISCONTINUED | OUTPATIENT
Start: 2022-10-23 | End: 2022-10-25

## 2022-10-23 RX ORDER — IRBESARTAN AND HYDROCHLOROTHIAZIDE 150; 12.5 MG/1; MG/1
1 TABLET, FILM COATED ORAL DAILY
COMMUNITY

## 2022-10-23 RX ORDER — IRBESARTAN AND HYDROCHLOROTHIAZIDE 150; 12.5 MG/1; MG/1
1 TABLET, FILM COATED ORAL DAILY
Status: DISCONTINUED | OUTPATIENT
Start: 2022-10-23 | End: 2022-10-23 | Stop reason: SDUPTHER

## 2022-10-23 RX ORDER — ONDANSETRON 2 MG/ML
4 INJECTION INTRAMUSCULAR; INTRAVENOUS EVERY 4 HOURS PRN
Status: ACTIVE | OUTPATIENT
Start: 2022-10-23 | End: 2022-10-23

## 2022-10-23 RX ORDER — HEPARIN SODIUM AND DEXTROSE 10000; 5 [USP'U]/100ML; G/100ML
INJECTION INTRAVENOUS CONTINUOUS
Status: DISPENSED | OUTPATIENT
Start: 2022-10-23 | End: 2022-10-24

## 2022-10-23 NOTE — ED INITIAL ASSESSMENT (HPI)
Pt arrived from home c/o \"heart palpitations\" and chest pains in middle of her chest. Has had them previously in august when she had covid but over last week pain has been constant.

## 2022-10-23 NOTE — PLAN OF CARE
NURSING ADMISSION NOTE      Patient admitted via Cart  Oriented to room. Safety precautions initiated. Bed in low position. Call light in reach. Admission navigator completed. Patient alert and oriented x4. On RA. Heparin gtt running at 17mL/hr.  Verified rate with second RN at 1821. Up ad karen.

## 2022-10-23 NOTE — ED QUICK NOTES
Orders for admission, patient is aware of plan and ready to go upstairs. Any questions, please call ED RN Andrew Carias at extension [phone].      Patient Covid vaccination status: Fully vaccinated     COVID Test Ordered in ED: Rapid SARS-CoV-2 by PCR    COVID Suspicion at Admission: Low clinical suspicion for COVID    Running Infusions:  None    Mental Status/LOC at time of transport: a/ox4    Other pertinent information:   CIWA score: N/A   NIH score:  N/A     +PE - will be started on heparin gtt- see MAR  Sinus tachycardia  A/ox4  Self care

## 2022-10-24 ENCOUNTER — APPOINTMENT (OUTPATIENT)
Dept: CV DIAGNOSTICS | Facility: HOSPITAL | Age: 52
End: 2022-10-24
Attending: INTERNAL MEDICINE
Payer: COMMERCIAL

## 2022-10-24 LAB
APTT PPP: 110.8 SECONDS (ref 23.3–35.6)
PLATELET # BLD AUTO: 172 10(3)UL (ref 150–450)
POTASSIUM SERPL-SCNC: 3.8 MMOL/L (ref 3.5–5.1)
T4 FREE SERPL-MCNC: 6.9 NG/DL (ref 0.8–1.7)
TSI SER-ACNC: <0.005 MIU/ML (ref 0.36–3.74)

## 2022-10-24 PROCEDURE — 93306 TTE W/DOPPLER COMPLETE: CPT | Performed by: INTERNAL MEDICINE

## 2022-10-24 PROCEDURE — 99254 IP/OBS CNSLTJ NEW/EST MOD 60: CPT | Performed by: INTERNAL MEDICINE

## 2022-10-24 PROCEDURE — 99232 SBSQ HOSP IP/OBS MODERATE 35: CPT | Performed by: INTERNAL MEDICINE

## 2022-10-24 PROCEDURE — 99223 1ST HOSP IP/OBS HIGH 75: CPT | Performed by: STUDENT IN AN ORGANIZED HEALTH CARE EDUCATION/TRAINING PROGRAM

## 2022-10-24 RX ORDER — METHIMAZOLE 10 MG/1
20 TABLET ORAL 2 TIMES DAILY
Status: DISCONTINUED | OUTPATIENT
Start: 2022-10-24 | End: 2022-10-25

## 2022-10-24 RX ORDER — POTASSIUM CHLORIDE 20 MEQ/1
40 TABLET, EXTENDED RELEASE ORAL ONCE
Status: COMPLETED | OUTPATIENT
Start: 2022-10-24 | End: 2022-10-24

## 2022-10-24 RX ORDER — PROPRANOLOL HYDROCHLORIDE 20 MG/1
20 TABLET ORAL
Status: DISCONTINUED | OUTPATIENT
Start: 2022-10-24 | End: 2022-10-25

## 2022-10-24 NOTE — PLAN OF CARE
Assumed patient care at 7:30. A/Ox4. Room air. Sinus Tach on tele. Heparin @ 13. Cardiac diet. K replaced. Redraw tomorrow. All safety precautions are in place and will continue to monitor.       Problem: Patient Centered Care  Goal: Patient preferences are identified and integrated in the patient's plan of care  Description: Interventions:  - What would you like us to know as we care for you?   - Provide timely, complete, and accurate information to patient/family  - Incorporate patient and family knowledge, values, beliefs, and cultural backgrounds into the planning and delivery of care  - Encourage patient/family to participate in care and decision-making at the level they choose  - Honor patient and family perspectives and choices  Outcome: Progressing

## 2022-10-24 NOTE — PROGRESS NOTES
Pt awake and alert. On tele running ST.  RA. Heparin gtt. APTT redraw supratherapeutic. Now at 14.5ml/hr. Plan for 2D Echo today. Cards to evaluate. Received message from pre-service that visit was denied by pt's [facility] Medicare Complete plan and d/t [facility] is out of network but my [facility] is in-network. Called and explained to pt that she may incur a bill with the visit d/t facility cost and can contact financial services for further information on out of pocket cost. Pt stated she has been coming to [facility] and does not understand why she would be getting a bill now. I called pre-service and verified that [facility] is out of network with her insurance plan and she may receive bill after service. Encouraged pt to call financial services for further information on cost. She stated she prefers and will call her insurance company instead.

## 2022-10-25 ENCOUNTER — APPOINTMENT (OUTPATIENT)
Dept: ULTRASOUND IMAGING | Facility: HOSPITAL | Age: 52
End: 2022-10-25
Attending: INTERNAL MEDICINE
Payer: COMMERCIAL

## 2022-10-25 VITALS
SYSTOLIC BLOOD PRESSURE: 145 MMHG | BODY MASS INDEX: 40.83 KG/M2 | HEIGHT: 61 IN | TEMPERATURE: 98 F | OXYGEN SATURATION: 98 % | HEART RATE: 96 BPM | WEIGHT: 216.25 LBS | RESPIRATION RATE: 20 BRPM | DIASTOLIC BLOOD PRESSURE: 73 MMHG

## 2022-10-25 LAB
PLATELET # BLD AUTO: 174 10(3)UL (ref 150–450)
POTASSIUM SERPL-SCNC: 3.9 MMOL/L (ref 3.5–5.1)
T3 SERPL-MCNC: 363 NG/DL (ref 60–181)
T4 FREE SERPL-MCNC: 6.5 NG/DL (ref 0.8–1.7)
TSI SER-ACNC: <0.005 MIU/ML (ref 0.36–3.74)

## 2022-10-25 PROCEDURE — 93970 EXTREMITY STUDY: CPT | Performed by: INTERNAL MEDICINE

## 2022-10-25 PROCEDURE — 99239 HOSP IP/OBS DSCHRG MGMT >30: CPT | Performed by: INTERNAL MEDICINE

## 2022-10-25 PROCEDURE — 99233 SBSQ HOSP IP/OBS HIGH 50: CPT | Performed by: STUDENT IN AN ORGANIZED HEALTH CARE EDUCATION/TRAINING PROGRAM

## 2022-10-25 RX ORDER — PROPRANOLOL HYDROCHLORIDE 20 MG/1
20 TABLET ORAL
Qty: 120 TABLET | Refills: 3 | Status: SHIPPED | OUTPATIENT
Start: 2022-10-25 | End: 2022-10-25

## 2022-10-25 RX ORDER — ATENOLOL 25 MG/1
25 TABLET ORAL DAILY
Qty: 30 TABLET | Refills: 0 | Status: SHIPPED | OUTPATIENT
Start: 2022-10-25 | End: 2022-11-15

## 2022-10-25 RX ORDER — METHIMAZOLE 10 MG/1
20 TABLET ORAL 2 TIMES DAILY
Qty: 120 TABLET | Refills: 0 | Status: SHIPPED | OUTPATIENT
Start: 2022-10-25 | End: 2022-10-25

## 2022-10-25 RX ORDER — METHIMAZOLE 10 MG/1
20 TABLET ORAL 2 TIMES DAILY
Qty: 120 TABLET | Refills: 0 | Status: SHIPPED | OUTPATIENT
Start: 2022-10-25

## 2022-10-25 NOTE — DISCHARGE PLANNING
IV and tele removed from patient. Answered all questions patient had at time of discharge. Went over discharge paperwork with patient.

## 2022-10-25 NOTE — PLAN OF CARE
Assumed patient care at 7:30. A/Ox4. Room air. Normal/Sinus Tach rhythm on tele. Cardiac diet. Electrolyte protocol (cardiac). Ultrasound Bilateral doppler completed. PIV R ac saline locked. Plans for discharge today. All safety precautions are in place and will continue to monitor.        Problem: Patient Centered Care  Goal: Patient preferences are identified and integrated in the patient's plan of care  Description: Interventions:  - What would you like us to know as we care for you?   - Provide timely, complete, and accurate information to patient/family  - Incorporate patient and family knowledge, values, beliefs, and cultural backgrounds into the planning and delivery of care  - Encourage patient/family to participate in care and decision-making at the level they choose  - Honor patient and family perspectives and choices  Outcome: Progressing     Problem: RESPIRATORY - ADULT  Goal: Achieves optimal ventilation and oxygenation  Description: INTERVENTIONS:  - Assess for changes in respiratory status  - Assess for changes in mentation and behavior  - Position to facilitate oxygenation and minimize respiratory effort  - Oxygen supplementation based on oxygen saturation or ABGs  - Provide Smoking Cessation handout, if applicable  - Encourage broncho-pulmonary hygiene including cough, deep breathe, Incentive Spirometry  - Assess the need for suctioning and perform as needed  - Assess and instruct to report SOB or any respiratory difficulty  - Respiratory Therapy support as indicated  - Manage/alleviate anxiety  - Monitor for signs/symptoms of CO2 retention  Outcome: Progressing

## 2022-10-25 NOTE — PLAN OF CARE
Pt is A&0x4, on tele-sr/st. Room air, no SOB. Heparin gtt d/c'd yesterday, pt started on eliquis. Pt denies any pain overnight. Plan for am labs and probable discharge depending on labs. Pt updated on plan of care, all questions answered.

## 2022-10-25 NOTE — DISCHARGE INSTRUCTIONS
The cardiology office will call to arrange for a 30 day MCT monitor. Please contact your [facility] endocrinologist's office for a hospital-follow up. You are recommended to also get repeat thyroid labs before the end of this week.

## 2022-10-26 ENCOUNTER — PATIENT OUTREACH (OUTPATIENT)
Dept: CASE MANAGEMENT | Age: 52
End: 2022-10-26

## 2022-10-26 DIAGNOSIS — Z02.9 ENCOUNTERS FOR ADMINISTRATIVE PURPOSE: ICD-10-CM

## 2022-10-26 NOTE — PAYOR COMM NOTE
--------------  DISCHARGE REVIEW    Payor: [facility]  Subscriber #:  [id number]  Authorization Number: [id number]    Admit date: 10/23/22  Admit time:   6:04 PM  Discharge Date: 10/25/2022  4:21 PM     Admitting Physician: Shi John MD  Attending Physician:  No att. providers found  Primary Care Physician: Jonathon oCyne MD

## 2022-10-27 ENCOUNTER — TELEMEDICINE (OUTPATIENT)
Dept: INTERNAL MEDICINE CLINIC | Facility: CLINIC | Age: 52
End: 2022-10-27

## 2022-10-27 ENCOUNTER — LAB ENCOUNTER (OUTPATIENT)
Dept: LAB | Age: 52
End: 2022-10-27
Attending: STUDENT IN AN ORGANIZED HEALTH CARE EDUCATION/TRAINING PROGRAM
Payer: COMMERCIAL

## 2022-10-27 DIAGNOSIS — U07.1 COVID-19 VIRUS INFECTION: ICD-10-CM

## 2022-10-27 DIAGNOSIS — I26.99 OTHER ACUTE PULMONARY EMBOLISM WITHOUT ACUTE COR PULMONALE (HCC): Primary | ICD-10-CM

## 2022-10-27 DIAGNOSIS — I10 ESSENTIAL HYPERTENSION: ICD-10-CM

## 2022-10-27 DIAGNOSIS — E05.90 HYPERTHYROIDISM: ICD-10-CM

## 2022-10-27 DIAGNOSIS — R07.9 ACUTE CHEST PAIN: ICD-10-CM

## 2022-10-27 LAB
T4 FREE SERPL-MCNC: 4.5 NG/DL (ref 0.8–1.7)
TSI SER-ACNC: <0.005 MIU/ML (ref 0.36–3.74)

## 2022-10-27 PROCEDURE — 84443 ASSAY THYROID STIM HORMONE: CPT | Performed by: STUDENT IN AN ORGANIZED HEALTH CARE EDUCATION/TRAINING PROGRAM

## 2022-10-27 PROCEDURE — 99214 OFFICE O/P EST MOD 30 MIN: CPT | Performed by: NURSE PRACTITIONER

## 2022-10-27 PROCEDURE — 84439 ASSAY OF FREE THYROXINE: CPT | Performed by: STUDENT IN AN ORGANIZED HEALTH CARE EDUCATION/TRAINING PROGRAM

## 2022-10-28 PROBLEM — I26.99 OTHER ACUTE PULMONARY EMBOLISM WITHOUT ACUTE COR PULMONALE (HCC): Status: RESOLVED | Noted: 2022-10-23 | Resolved: 2022-10-28

## 2022-10-28 PROBLEM — E66.01 MORBID OBESITY WITH BODY MASS INDEX OF 45.0-49.9 IN ADULT (HCC): Status: RESOLVED | Noted: 2018-10-11 | Resolved: 2022-10-28

## 2022-10-28 PROBLEM — R79.89 AZOTEMIA: Status: RESOLVED | Noted: 2022-10-23 | Resolved: 2022-10-28

## 2022-10-28 LAB — THYROID STIMULATING IMMUNOGLOBULIN: 7.06 IU/L

## 2022-11-06 PROBLEM — R07.9 ACUTE CHEST PAIN: Status: ACTIVE | Noted: 2022-11-06

## 2022-11-06 PROBLEM — U07.1 COVID-19 VIRUS INFECTION: Status: ACTIVE | Noted: 2022-11-06

## 2022-11-08 NOTE — CDS QUERY
Conflicting Diagnoses  CLINICAL DOCUMENTATION CLARIFICATION FORM     Dear Provider:  Clinical information (provided below) documents conflicting diagnoses. For accurate ICD-10-CM code assignment to reflect severity of illness and risk of mortality,   BASED ON YOUR CLINICAL JUDGEMENT, PLEASE SELECT   THE MOST APPROPRIATE DIAGNOSIS:     (   ) Hyperthyroidism  (   ) Hypothyroidism  (   ) Other  (   ) Clinically Unable to determine  ________________________________________________________________        Documentation from the Medical Record:     Risk Factors: PMH Graves Disease    Clinical Indicators:   ___10/23 To ER with c/o chest pain, CTA shows PE. T4 level was elevated at 7.3.TSH level is less than 0.005.  ___10/23 H&P: #Graves Dz/Hyperthyroid -Endocrine eval in AM  ___10/24: Cardiology Consult Hypothyroidism with very low TSH and increased free T4. Graves' disease. Follows with endocrinology outpatient. Off methimazole since February 2020. Patient was in remission with thyroid disease.  ___10/25: Endocrine Note 10/25 #Graves disease #Thyrotoxicosis Pt with hx of Graves' disease s/p 2.5 yrs of MMI therapy (in remission since Jan 2020) who presents with CP and found to have PE, labs with significant thyrotoxicosis. BWPS of 5 (for tachycardia) not suggestive of impending storm. Pt did receive IV contrast but labs were collected prior. No inciting events. More likely true endogenous hyperthyroidism rather than thyroiditis, as total T3 also significantly elevated. ___TSH <0.005 10/23, 10/24, 10/25, 10/27  ___ T4 10/23: 7.3, 10/24: 6.9, 10/25: 6.5, 10/27: 4.5  ___Total T3 363    Treatment:   Endocrine consult, Methimazole BID started while inpatient and upon discharge, Daily fT4 and TT3 labs     For questions regarding this query, please contact Clinical Documentation : Lyn Valdivia RN [phone] [email or web address]    Thank you  THIS DOCUMENTATION IS A PERMAMENT PART OF THE MEDICAL RECORD

## 2022-11-15 ENCOUNTER — OFFICE VISIT (OUTPATIENT)
Dept: INTERNAL MEDICINE CLINIC | Facility: CLINIC | Age: 52
End: 2022-11-15
Payer: COMMERCIAL

## 2022-11-15 VITALS
HEART RATE: 79 BPM | DIASTOLIC BLOOD PRESSURE: 76 MMHG | WEIGHT: 209 LBS | BODY MASS INDEX: 39.46 KG/M2 | SYSTOLIC BLOOD PRESSURE: 118 MMHG | OXYGEN SATURATION: 98 % | RESPIRATION RATE: 18 BRPM | HEIGHT: 61 IN

## 2022-11-15 DIAGNOSIS — E05.00 GRAVES DISEASE: ICD-10-CM

## 2022-11-15 DIAGNOSIS — Z12.31 ENCOUNTER FOR SCREENING MAMMOGRAM FOR MALIGNANT NEOPLASM OF BREAST: ICD-10-CM

## 2022-11-15 DIAGNOSIS — E05.90 HYPERTHYROIDISM: ICD-10-CM

## 2022-11-15 DIAGNOSIS — R73.03 PREDIABETES: ICD-10-CM

## 2022-11-15 DIAGNOSIS — I10 ESSENTIAL HYPERTENSION: ICD-10-CM

## 2022-11-15 DIAGNOSIS — Z79.01 ANTICOAGULATED: ICD-10-CM

## 2022-11-15 DIAGNOSIS — E87.6 HYPOKALEMIA: ICD-10-CM

## 2022-11-15 DIAGNOSIS — I26.99 OTHER ACUTE PULMONARY EMBOLISM WITHOUT ACUTE COR PULMONALE (HCC): Primary | ICD-10-CM

## 2022-11-15 PROBLEM — R07.9 ACUTE CHEST PAIN: Status: RESOLVED | Noted: 2022-11-06 | Resolved: 2022-11-15

## 2022-11-15 PROBLEM — Z86.16 HISTORY OF COVID-19: Status: ACTIVE | Noted: 2022-11-06

## 2022-11-15 PROBLEM — R79.89 ELEVATED TROPONIN: Status: RESOLVED | Noted: 2022-10-23 | Resolved: 2022-11-15

## 2022-11-15 PROBLEM — R77.8 ELEVATED TROPONIN: Status: RESOLVED | Noted: 2022-10-23 | Resolved: 2022-11-15

## 2022-11-15 PROBLEM — R07.89 CHEST PAIN, ATYPICAL: Status: RESOLVED | Noted: 2022-10-23 | Resolved: 2022-11-15

## 2022-11-15 PROBLEM — R94.6 ABNORMAL THYROID FUNCTION TEST: Status: RESOLVED | Noted: 2022-10-23 | Resolved: 2022-11-15

## 2022-11-15 PROCEDURE — 3078F DIAST BP <80 MM HG: CPT | Performed by: NURSE PRACTITIONER

## 2022-11-15 PROCEDURE — 99214 OFFICE O/P EST MOD 30 MIN: CPT | Performed by: NURSE PRACTITIONER

## 2022-11-15 PROCEDURE — 3074F SYST BP LT 130 MM HG: CPT | Performed by: NURSE PRACTITIONER

## 2022-11-15 PROCEDURE — 3008F BODY MASS INDEX DOCD: CPT | Performed by: NURSE PRACTITIONER

## 2022-11-15 RX ORDER — ATENOLOL 25 MG/1
25 TABLET ORAL DAILY
Qty: 30 TABLET | Refills: 1 | Status: SHIPPED | OUTPATIENT
Start: 2022-11-15

## 2022-11-18 ENCOUNTER — TELEPHONE (OUTPATIENT)
Dept: HEMATOLOGY/ONCOLOGY | Facility: HOSPITAL | Age: 52
End: 2022-11-18

## 2022-11-18 NOTE — TELEPHONE ENCOUNTER
Patient is calling to schedule new consult appointment with Dr. Talha Sood, diagnosis: Other acute pulmonary embolism without acute cor pulmonale.  Call back number is [phone]

## 2022-12-02 ENCOUNTER — OFFICE VISIT (OUTPATIENT)
Dept: HEMATOLOGY/ONCOLOGY | Facility: HOSPITAL | Age: 52
End: 2022-12-02
Attending: INTERNAL MEDICINE
Payer: COMMERCIAL

## 2022-12-02 VITALS
BODY MASS INDEX: 40 KG/M2 | TEMPERATURE: 99 F | OXYGEN SATURATION: 96 % | SYSTOLIC BLOOD PRESSURE: 136 MMHG | DIASTOLIC BLOOD PRESSURE: 80 MMHG | RESPIRATION RATE: 16 BRPM | WEIGHT: 209.63 LBS | HEART RATE: 70 BPM

## 2022-12-02 DIAGNOSIS — I26.99 OTHER ACUTE PULMONARY EMBOLISM WITHOUT ACUTE COR PULMONALE (HCC): Primary | ICD-10-CM

## 2022-12-02 PROCEDURE — 99214 OFFICE O/P EST MOD 30 MIN: CPT | Performed by: INTERNAL MEDICINE

## 2022-12-02 NOTE — PROGRESS NOTES
Pt here for post hospital follow up. She is taking eliquis 1 tab BID. She started coughing yesterday. Clear sputum.        Outpatient Oncology Care Plan  Problem list:  knowledge deficit    Problems related to:    disease/disease progression    Interventions:  provided general teaching    Expected outcomes:  understands plan of care    Progress towards outcome:  making progress    Education Record    Learner:  Patient  Barriers / Limitations:  None  Method:  Brief focused  Outcome:  Shows understanding  Comments:

## 2022-12-10 ENCOUNTER — LAB ENCOUNTER (OUTPATIENT)
Dept: LAB | Age: 52
End: 2022-12-10
Attending: NURSE PRACTITIONER
Payer: COMMERCIAL

## 2022-12-10 DIAGNOSIS — I26.99 OTHER ACUTE PULMONARY EMBOLISM WITHOUT ACUTE COR PULMONALE (HCC): ICD-10-CM

## 2022-12-10 DIAGNOSIS — E05.90 HYPERTHYROIDISM: ICD-10-CM

## 2022-12-10 DIAGNOSIS — E87.6 HYPOKALEMIA: ICD-10-CM

## 2022-12-10 DIAGNOSIS — R73.03 PREDIABETES: ICD-10-CM

## 2022-12-10 DIAGNOSIS — E05.00 BASEDOW'S DISEASE: ICD-10-CM

## 2022-12-10 DIAGNOSIS — Z79.01 ANTICOAGULATED: ICD-10-CM

## 2022-12-10 DIAGNOSIS — E05.90 PRETIBIAL MYXEDEMA: Primary | ICD-10-CM

## 2022-12-10 LAB
ALBUMIN SERPL-MCNC: 3.4 G/DL (ref 3.4–5)
ALBUMIN/GLOB SERPL: 1 {RATIO} (ref 1–2)
ALP LIVER SERPL-CCNC: 86 U/L
ALT SERPL-CCNC: 19 U/L
ANION GAP SERPL CALC-SCNC: 3 MMOL/L (ref 0–18)
AST SERPL-CCNC: 20 U/L (ref 15–37)
BASOPHILS # BLD AUTO: 0.03 X10(3) UL (ref 0–0.2)
BASOPHILS NFR BLD AUTO: 0.7 %
BILIRUB SERPL-MCNC: 0.7 MG/DL (ref 0.1–2)
BUN BLD-MCNC: 12 MG/DL (ref 7–18)
CALCIUM BLD-MCNC: 9.2 MG/DL (ref 8.5–10.1)
CHLORIDE SERPL-SCNC: 107 MMOL/L (ref 98–112)
CO2 SERPL-SCNC: 31 MMOL/L (ref 21–32)
CREAT BLD-MCNC: 0.55 MG/DL
EOSINOPHIL # BLD AUTO: 0.27 X10(3) UL (ref 0–0.7)
EOSINOPHIL NFR BLD AUTO: 6 %
ERYTHROCYTE [DISTWIDTH] IN BLOOD BY AUTOMATED COUNT: 12.4 %
FASTING STATUS PATIENT QL REPORTED: YES
GFR SERPLBLD BASED ON 1.73 SQ M-ARVRAT: 112 ML/MIN/1.73M2 (ref 60–?)
GLOBULIN PLAS-MCNC: 3.5 G/DL (ref 2.8–4.4)
GLUCOSE BLD-MCNC: 96 MG/DL (ref 70–99)
HCT VFR BLD AUTO: 37.8 %
HGB BLD-MCNC: 12.7 G/DL
IMM GRANULOCYTES # BLD AUTO: 0.01 X10(3) UL (ref 0–1)
IMM GRANULOCYTES NFR BLD: 0.2 %
LYMPHOCYTES # BLD AUTO: 2.27 X10(3) UL (ref 1–4)
LYMPHOCYTES NFR BLD AUTO: 50.8 %
MCH RBC QN AUTO: 29.2 PG (ref 26–34)
MCHC RBC AUTO-ENTMCNC: 33.6 G/DL (ref 31–37)
MCV RBC AUTO: 86.9 FL
MONOCYTES # BLD AUTO: 0.34 X10(3) UL (ref 0.1–1)
MONOCYTES NFR BLD AUTO: 7.6 %
NEUTROPHILS # BLD AUTO: 1.55 X10 (3) UL (ref 1.5–7.7)
NEUTROPHILS # BLD AUTO: 1.55 X10(3) UL (ref 1.5–7.7)
NEUTROPHILS NFR BLD AUTO: 34.7 %
OSMOLALITY SERPL CALC.SUM OF ELEC: 292 MOSM/KG (ref 275–295)
PLATELET # BLD AUTO: 264 10(3)UL (ref 150–450)
POTASSIUM SERPL-SCNC: 4 MMOL/L (ref 3.5–5.1)
PROT SERPL-MCNC: 6.9 G/DL (ref 6.4–8.2)
RBC # BLD AUTO: 4.35 X10(6)UL
SODIUM SERPL-SCNC: 141 MMOL/L (ref 136–145)
T4 FREE SERPL-MCNC: 0.5 NG/DL (ref 0.8–1.7)
TSI SER-ACNC: <0.005 MIU/ML (ref 0.36–3.74)
WBC # BLD AUTO: 4.5 X10(3) UL (ref 4–11)

## 2022-12-10 PROCEDURE — 85025 COMPLETE CBC W/AUTO DIFF WBC: CPT | Performed by: NURSE PRACTITIONER

## 2022-12-10 PROCEDURE — 80053 COMPREHEN METABOLIC PANEL: CPT | Performed by: NURSE PRACTITIONER

## 2022-12-20 ENCOUNTER — OFFICE VISIT (OUTPATIENT)
Dept: URGENT CARE | Age: 52
End: 2022-12-20

## 2022-12-20 ENCOUNTER — APPOINTMENT (OUTPATIENT)
Dept: URGENT CARE | Age: 52
End: 2022-12-20

## 2022-12-20 VITALS
DIASTOLIC BLOOD PRESSURE: 82 MMHG | OXYGEN SATURATION: 99 % | HEART RATE: 58 BPM | TEMPERATURE: 98.3 F | BODY MASS INDEX: 38.51 KG/M2 | SYSTOLIC BLOOD PRESSURE: 128 MMHG | WEIGHT: 204 LBS | HEIGHT: 61 IN | RESPIRATION RATE: 20 BRPM

## 2022-12-20 DIAGNOSIS — J30.9 ALLERGIC RHINITIS, UNSPECIFIED SEASONALITY, UNSPECIFIED TRIGGER: ICD-10-CM

## 2022-12-20 DIAGNOSIS — J06.9 UPPER RESPIRATORY TRACT INFECTION, UNSPECIFIED TYPE: Primary | ICD-10-CM

## 2022-12-20 DIAGNOSIS — J06.9 UPPER RESPIRATORY TRACT INFECTION, UNSPECIFIED TYPE: ICD-10-CM

## 2022-12-20 PROBLEM — Z79.01 ANTICOAGULANT LONG-TERM USE: Status: ACTIVE | Noted: 2022-12-02

## 2022-12-20 PROBLEM — Z86.16 HISTORY OF COVID-19: Status: ACTIVE | Noted: 2022-11-06

## 2022-12-20 PROBLEM — R00.0 TACHYCARDIA: Status: ACTIVE | Noted: 2022-12-02

## 2022-12-20 PROBLEM — R32 URINARY INCONTINENCE: Status: ACTIVE | Noted: 2020-10-26

## 2022-12-20 PROBLEM — E05.00 GRAVES DISEASE: Status: ACTIVE | Noted: 2017-10-15

## 2022-12-20 PROBLEM — D47.1: Status: ACTIVE | Noted: 2022-12-02

## 2022-12-20 PROBLEM — I27.29: Status: ACTIVE | Noted: 2022-12-02

## 2022-12-20 PROBLEM — I26.99 PULMONARY EMBOLUS (CMD): Status: ACTIVE | Noted: 2022-12-02

## 2022-12-20 PROBLEM — E66.9 OBESITY WITH BODY MASS INDEX 30 OR GREATER: Status: ACTIVE | Noted: 2022-12-02

## 2022-12-20 PROBLEM — J34.1 NASAL SINUS CYST: Status: ACTIVE | Noted: 2017-10-09

## 2022-12-20 PROBLEM — E05.90 HYPERTHYROIDISM: Status: ACTIVE | Noted: 2018-01-21

## 2022-12-20 PROBLEM — M17.12 PRIMARY OSTEOARTHRITIS OF LEFT KNEE: Status: ACTIVE | Noted: 2021-11-22

## 2022-12-20 PROBLEM — M62.81 PROXIMAL MUSCLE WEAKNESS: Status: ACTIVE | Noted: 2017-10-26

## 2022-12-20 PROBLEM — R73.03 PREDIABETES: Status: ACTIVE | Noted: 2020-10-26

## 2022-12-20 PROBLEM — E66.01 MORBID OBESITY (CMD): Status: ACTIVE | Noted: 2022-12-20

## 2022-12-20 PROBLEM — R42 VERTIGO: Status: ACTIVE | Noted: 2017-10-01

## 2022-12-20 PROBLEM — E78.00 ELEVATED CHOLESTEROL: Status: ACTIVE | Noted: 2021-11-22

## 2022-12-20 PROBLEM — R79.89 ABNORMAL LIVER FUNCTION TESTS: Status: ACTIVE | Noted: 2017-10-26

## 2022-12-20 PROBLEM — Z90.710 S/P HYSTERECTOMY: Status: ACTIVE | Noted: 2017-03-10

## 2022-12-20 LAB
FLUAV AG UPPER RESP QL IA.RAPID: NEGATIVE
FLUBV AG UPPER RESP QL IA.RAPID: NEGATIVE
SARS-COV+SARS-COV-2 AG RESP QL IA.RAPID: NOT DETECTED
TEST LOT EXPIRATION DATE: NORMAL
TEST LOT NUMBER: NORMAL

## 2022-12-20 PROCEDURE — 3074F SYST BP LT 130 MM HG: CPT | Performed by: NURSE PRACTITIONER

## 2022-12-20 PROCEDURE — 99214 OFFICE O/P EST MOD 30 MIN: CPT | Performed by: NURSE PRACTITIONER

## 2022-12-20 PROCEDURE — 3079F DIAST BP 80-89 MM HG: CPT | Performed by: NURSE PRACTITIONER

## 2022-12-20 PROCEDURE — 87428 SARSCOV & INF VIR A&B AG IA: CPT | Performed by: NURSE PRACTITIONER

## 2022-12-20 RX ORDER — METHIMAZOLE 10 MG/1
20 TABLET ORAL
COMMUNITY
Start: 2022-10-25

## 2022-12-20 RX ORDER — BENZONATATE 200 MG/1
200 CAPSULE ORAL 3 TIMES DAILY PRN
Qty: 20 CAPSULE | Refills: 0 | Status: SHIPPED | OUTPATIENT
Start: 2022-12-20

## 2022-12-20 RX ORDER — DEXTROMETHORPHAN HYDROBROMIDE AND PROMETHAZINE HYDROCHLORIDE 15; 6.25 MG/5ML; MG/5ML
5 SYRUP ORAL
Qty: 118 ML | Refills: 0 | Status: SHIPPED | OUTPATIENT
Start: 2022-12-20

## 2022-12-20 RX ORDER — IRBESARTAN AND HYDROCHLOROTHIAZIDE 150; 12.5 MG/1; MG/1
1 TABLET, FILM COATED ORAL EVERY MORNING
COMMUNITY
Start: 2022-12-02

## 2022-12-20 RX ORDER — METHIMAZOLE 10 MG/1
TABLET ORAL
COMMUNITY
Start: 2022-11-17

## 2022-12-20 RX ORDER — ATENOLOL 25 MG/1
TABLET ORAL
COMMUNITY
Start: 2022-11-17

## 2022-12-20 RX ORDER — APIXABAN 5 MG/1
TABLET, FILM COATED ORAL
COMMUNITY
Start: 2022-11-25

## 2022-12-20 RX ORDER — FLUTICASONE PROPIONATE 50 MCG
2 SPRAY, SUSPENSION (ML) NASAL DAILY
Qty: 16 G | Refills: 0 | Status: SHIPPED | OUTPATIENT
Start: 2022-12-20

## 2022-12-20 RX ORDER — FLUTICASONE PROPIONATE 50 MCG
SPRAY, SUSPENSION (ML) NASAL
Qty: 48 G | OUTPATIENT
Start: 2022-12-20

## 2022-12-28 ENCOUNTER — HOSPITAL ENCOUNTER (OUTPATIENT)
Dept: MAMMOGRAPHY | Age: 52
Discharge: HOME | End: 2022-12-28
Attending: NURSE PRACTITIONER
Payer: COMMERCIAL

## 2022-12-28 DIAGNOSIS — Z12.31 ENCOUNTER FOR SCREENING MAMMOGRAM FOR MALIGNANT NEOPLASM OF BREAST: ICD-10-CM

## 2022-12-28 PROCEDURE — 77067 SCR MAMMO BI INCL CAD: CPT | Performed by: NURSE PRACTITIONER

## 2022-12-28 PROCEDURE — 77063 BREAST TOMOSYNTHESIS BI: CPT | Performed by: NURSE PRACTITIONER

## 2023-01-24 ENCOUNTER — LAB ENCOUNTER (OUTPATIENT)
Dept: LAB | Age: 53
End: 2023-01-24
Attending: INTERNAL MEDICINE
Payer: COMMERCIAL

## 2023-01-24 DIAGNOSIS — E05.00 BASEDOW'S DISEASE: ICD-10-CM

## 2023-01-24 DIAGNOSIS — E05.90 HYPERTHYROIDISM: Primary | ICD-10-CM

## 2023-01-24 PROCEDURE — 84481 FREE ASSAY (FT-3): CPT

## 2023-01-24 PROCEDURE — 84439 ASSAY OF FREE THYROXINE: CPT

## 2023-01-24 PROCEDURE — 84443 ASSAY THYROID STIM HORMONE: CPT

## 2023-01-25 LAB — T3FREE SERPL-MCNC: 1.53 PG/ML (ref 2.4–4.2)

## 2023-01-26 LAB
T4 FREE SERPL-MCNC: 0.2 NG/DL (ref 0.8–1.7)
TSI SER-ACNC: 25.3 MIU/ML (ref 0.36–3.74)

## 2023-02-08 NOTE — TELEPHONE ENCOUNTER
Managed by hematology  Please let me know if she is running low for the upcoming weekend and I will send short supply.

## 2023-02-13 RX ORDER — APIXABAN 5 MG/1
TABLET, FILM COATED ORAL
Qty: 60 TABLET | Refills: 1 | OUTPATIENT
Start: 2023-02-13

## 2023-02-13 NOTE — TELEPHONE ENCOUNTER
Please see previous refill request.  This should be filled by hematology  Ok to fill short supply if needed as she cannot run out.

## 2023-02-17 ENCOUNTER — LAB ENCOUNTER (OUTPATIENT)
Dept: LAB | Age: 53
End: 2023-02-17
Attending: INTERNAL MEDICINE
Payer: COMMERCIAL

## 2023-02-17 DIAGNOSIS — E78.00 PURE HYPERCHOLESTEROLEMIA: ICD-10-CM

## 2023-02-17 DIAGNOSIS — I26.99 OTHER ACUTE PULMONARY EMBOLISM WITHOUT ACUTE COR PULMONALE (HCC): ICD-10-CM

## 2023-02-17 DIAGNOSIS — R73.03 BORDERLINE DIABETES: ICD-10-CM

## 2023-02-17 DIAGNOSIS — E05.90 PRETIBIAL MYXEDEMA: ICD-10-CM

## 2023-02-17 DIAGNOSIS — I10 HTN (HYPERTENSION): Primary | ICD-10-CM

## 2023-02-17 LAB
D DIMER PPP FEU-MCNC: <0.27 UG/ML FEU (ref ?–0.51)
T3 SERPL-MCNC: 118 NG/DL (ref 60–181)
T4 FREE SERPL-MCNC: 0.7 NG/DL (ref 0.8–1.7)
TSI SER-ACNC: 1.79 MIU/ML (ref 0.36–3.74)

## 2023-02-17 PROCEDURE — 84480 ASSAY TRIIODOTHYRONINE (T3): CPT

## 2023-02-17 PROCEDURE — 84443 ASSAY THYROID STIM HORMONE: CPT

## 2023-02-17 PROCEDURE — 84439 ASSAY OF FREE THYROXINE: CPT

## 2023-02-17 PROCEDURE — 85379 FIBRIN DEGRADATION QUANT: CPT

## 2023-02-20 ENCOUNTER — OFFICE VISIT (OUTPATIENT)
Dept: HEMATOLOGY/ONCOLOGY | Facility: HOSPITAL | Age: 53
End: 2023-02-20
Attending: INTERNAL MEDICINE
Payer: COMMERCIAL

## 2023-02-20 VITALS
WEIGHT: 223 LBS | TEMPERATURE: 98 F | OXYGEN SATURATION: 94 % | BODY MASS INDEX: 42 KG/M2 | HEART RATE: 70 BPM | RESPIRATION RATE: 16 BRPM | DIASTOLIC BLOOD PRESSURE: 82 MMHG | SYSTOLIC BLOOD PRESSURE: 145 MMHG

## 2023-02-20 DIAGNOSIS — I26.99 OTHER ACUTE PULMONARY EMBOLISM WITHOUT ACUTE COR PULMONALE (HCC): Primary | ICD-10-CM

## 2023-02-20 PROCEDURE — 99213 OFFICE O/P EST LOW 20 MIN: CPT | Performed by: INTERNAL MEDICINE

## 2023-02-20 NOTE — PROGRESS NOTES
Pt here for follow up. Pt is taking eliquis BID. She missed 3 days of eliquis due to pharmacy was out of medication.     Outpatient Oncology Care Plan  Problem list:  knowledge deficit    Problems related to:    disease/disease progression    Interventions:  provided general teaching    Expected outcomes:  understands plan of care    Progress towards outcome:  making progress    Education Record    Learner:  Patient  Barriers / Limitations:  None  Method:  Brief focused  Outcome:  Shows understanding  Comments:

## 2023-04-07 ENCOUNTER — APPOINTMENT (OUTPATIENT)
Dept: ULTRASOUND IMAGING | Facility: HOSPITAL | Age: 53
End: 2023-04-07
Attending: EMERGENCY MEDICINE
Payer: COMMERCIAL

## 2023-04-07 ENCOUNTER — APPOINTMENT (OUTPATIENT)
Dept: GENERAL RADIOLOGY | Facility: HOSPITAL | Age: 53
End: 2023-04-07
Attending: SURGERY
Payer: COMMERCIAL

## 2023-04-07 ENCOUNTER — HOSPITAL ENCOUNTER (OUTPATIENT)
Facility: HOSPITAL | Age: 53
Setting detail: OBSERVATION
Discharge: HOME | End: 2023-04-09
Attending: EMERGENCY MEDICINE | Admitting: HOSPITALIST
Payer: COMMERCIAL

## 2023-04-07 ENCOUNTER — ANESTHESIA EVENT (OUTPATIENT)
Dept: SURGERY | Facility: HOSPITAL | Age: 53
End: 2023-04-07
Payer: COMMERCIAL

## 2023-04-07 ENCOUNTER — ANESTHESIA (OUTPATIENT)
Dept: SURGERY | Facility: HOSPITAL | Age: 53
End: 2023-04-07
Payer: COMMERCIAL

## 2023-04-07 DIAGNOSIS — K80.50 BILIARY COLIC: Primary | ICD-10-CM

## 2023-04-07 DIAGNOSIS — K81.9 CHOLECYSTITIS: ICD-10-CM

## 2023-04-07 PROBLEM — E78.5 HYPERLIPIDEMIA: Status: ACTIVE | Noted: 2021-11-22

## 2023-04-07 PROBLEM — K80.00 ACUTE CHOLECYSTITIS DUE TO BILIARY CALCULUS: Status: ACTIVE | Noted: 2023-04-07

## 2023-04-07 LAB
ALBUMIN SERPL-MCNC: 3.3 G/DL (ref 3.4–5)
ALBUMIN/GLOB SERPL: 0.8 {RATIO} (ref 1–2)
ALP LIVER SERPL-CCNC: 82 U/L
ALT SERPL-CCNC: 13 U/L
ANION GAP SERPL CALC-SCNC: 4 MMOL/L (ref 0–18)
AST SERPL-CCNC: 17 U/L (ref 15–37)
BASOPHILS # BLD AUTO: 0.01 X10(3) UL (ref 0–0.2)
BASOPHILS NFR BLD AUTO: 0.2 %
BILIRUB SERPL-MCNC: 1.1 MG/DL (ref 0.1–2)
BILIRUB UR QL STRIP.AUTO: NEGATIVE
BUN BLD-MCNC: 11 MG/DL (ref 7–18)
CALCIUM BLD-MCNC: 9.5 MG/DL (ref 8.5–10.1)
CHLORIDE SERPL-SCNC: 108 MMOL/L (ref 98–112)
CO2 SERPL-SCNC: 29 MMOL/L (ref 21–32)
COLOR UR AUTO: YELLOW
CREAT BLD-MCNC: 0.5 MG/DL
EOSINOPHIL # BLD AUTO: 0.15 X10(3) UL (ref 0–0.7)
EOSINOPHIL NFR BLD AUTO: 3.4 %
ERYTHROCYTE [DISTWIDTH] IN BLOOD BY AUTOMATED COUNT: 11.2 %
GFR SERPLBLD BASED ON 1.73 SQ M-ARVRAT: 113 ML/MIN/1.73M2 (ref 60–?)
GLOBULIN PLAS-MCNC: 3.9 G/DL (ref 2.8–4.4)
GLUCOSE BLD-MCNC: 107 MG/DL (ref 70–99)
GLUCOSE UR STRIP.AUTO-MCNC: NEGATIVE MG/DL
HCT VFR BLD AUTO: 37.4 %
HGB BLD-MCNC: 12.9 G/DL
IMM GRANULOCYTES # BLD AUTO: 0.01 X10(3) UL (ref 0–1)
IMM GRANULOCYTES NFR BLD: 0.2 %
KETONES UR STRIP.AUTO-MCNC: NEGATIVE MG/DL
LEUKOCYTE ESTERASE UR QL STRIP.AUTO: NEGATIVE
LIPASE SERPL-CCNC: 36 U/L (ref 13–75)
LYMPHOCYTES # BLD AUTO: 1.85 X10(3) UL (ref 1–4)
LYMPHOCYTES NFR BLD AUTO: 41.4 %
MCH RBC QN AUTO: 28.5 PG (ref 26–34)
MCHC RBC AUTO-ENTMCNC: 34.5 G/DL (ref 31–37)
MCV RBC AUTO: 82.7 FL
MONOCYTES # BLD AUTO: 0.47 X10(3) UL (ref 0.1–1)
MONOCYTES NFR BLD AUTO: 10.5 %
NEUTROPHILS # BLD AUTO: 1.98 X10 (3) UL (ref 1.5–7.7)
NEUTROPHILS # BLD AUTO: 1.98 X10(3) UL (ref 1.5–7.7)
NEUTROPHILS NFR BLD AUTO: 44.3 %
NITRITE UR QL STRIP.AUTO: NEGATIVE
OSMOLALITY SERPL CALC.SUM OF ELEC: 292 MOSM/KG (ref 275–295)
PH UR STRIP.AUTO: 7 [PH] (ref 5–8)
PLATELET # BLD AUTO: 238 10(3)UL (ref 150–450)
POTASSIUM SERPL-SCNC: 3.6 MMOL/L (ref 3.5–5.1)
PROT SERPL-MCNC: 7.2 G/DL (ref 6.4–8.2)
PROT UR STRIP.AUTO-MCNC: NEGATIVE MG/DL
RBC # BLD AUTO: 4.52 X10(6)UL
RBC UR QL AUTO: NEGATIVE
SARS-COV-2 RNA RESP QL NAA+PROBE: NOT DETECTED
SODIUM SERPL-SCNC: 141 MMOL/L (ref 136–145)
SP GR UR STRIP.AUTO: 1.02 (ref 1–1.03)
UROBILINOGEN UR STRIP.AUTO-MCNC: <2 MG/DL
WBC # BLD AUTO: 4.5 X10(3) UL (ref 4–11)

## 2023-04-07 PROCEDURE — 76700 US EXAM ABDOM COMPLETE: CPT | Performed by: EMERGENCY MEDICINE

## 2023-04-07 PROCEDURE — 99223 1ST HOSP IP/OBS HIGH 75: CPT | Performed by: HOSPITALIST

## 2023-04-07 PROCEDURE — 47563 LAPARO CHOLECYSTECTOMY/GRAPH: CPT | Performed by: PHYSICIAN ASSISTANT

## 2023-04-07 PROCEDURE — 74300 X-RAY BILE DUCTS/PANCREAS: CPT | Performed by: SURGERY

## 2023-04-07 PROCEDURE — 47563 LAPARO CHOLECYSTECTOMY/GRAPH: CPT | Performed by: SURGERY

## 2023-04-07 PROCEDURE — BF100ZZ FLUOROSCOPY OF BILE DUCTS USING HIGH OSMOLAR CONTRAST: ICD-10-PCS | Performed by: SURGERY

## 2023-04-07 PROCEDURE — 99244 OFF/OP CNSLTJ NEW/EST MOD 40: CPT | Performed by: SURGERY

## 2023-04-07 PROCEDURE — 0FT44ZZ RESECTION OF GALLBLADDER, PERCUTANEOUS ENDOSCOPIC APPROACH: ICD-10-PCS | Performed by: SURGERY

## 2023-04-07 RX ORDER — ONDANSETRON 2 MG/ML
INJECTION INTRAMUSCULAR; INTRAVENOUS AS NEEDED
Status: DISCONTINUED | OUTPATIENT
Start: 2023-04-07 | End: 2023-04-07 | Stop reason: SURG

## 2023-04-07 RX ORDER — CEFOXITIN 1 G/1
INJECTION, POWDER, FOR SOLUTION INTRAVENOUS AS NEEDED
Status: DISCONTINUED | OUTPATIENT
Start: 2023-04-07 | End: 2023-04-07 | Stop reason: SURG

## 2023-04-07 RX ORDER — ONDANSETRON 2 MG/ML
4 INJECTION INTRAMUSCULAR; INTRAVENOUS ONCE
Status: COMPLETED | OUTPATIENT
Start: 2023-04-07 | End: 2023-04-07

## 2023-04-07 RX ORDER — NALOXONE HYDROCHLORIDE 0.4 MG/ML
80 INJECTION, SOLUTION INTRAMUSCULAR; INTRAVENOUS; SUBCUTANEOUS AS NEEDED
Status: DISCONTINUED | OUTPATIENT
Start: 2023-04-07 | End: 2023-04-07 | Stop reason: HOSPADM

## 2023-04-07 RX ORDER — MORPHINE SULFATE 4 MG/ML
4 INJECTION, SOLUTION INTRAMUSCULAR; INTRAVENOUS ONCE
Status: COMPLETED | OUTPATIENT
Start: 2023-04-07 | End: 2023-04-07

## 2023-04-07 RX ORDER — MORPHINE SULFATE 4 MG/ML
4 INJECTION, SOLUTION INTRAMUSCULAR; INTRAVENOUS EVERY 30 MIN PRN
Status: ACTIVE | OUTPATIENT
Start: 2023-04-07 | End: 2023-04-07

## 2023-04-07 RX ORDER — HYDROMORPHONE HYDROCHLORIDE 1 MG/ML
0.2 INJECTION, SOLUTION INTRAMUSCULAR; INTRAVENOUS; SUBCUTANEOUS EVERY 5 MIN PRN
Status: DISCONTINUED | OUTPATIENT
Start: 2023-04-07 | End: 2023-04-07 | Stop reason: HOSPADM

## 2023-04-07 RX ORDER — DEXAMETHASONE SODIUM PHOSPHATE 4 MG/ML
VIAL (ML) INJECTION AS NEEDED
Status: DISCONTINUED | OUTPATIENT
Start: 2023-04-07 | End: 2023-04-07 | Stop reason: SURG

## 2023-04-07 RX ORDER — IRBESARTAN AND HYDROCHLOROTHIAZIDE 150; 12.5 MG/1; MG/1
1 TABLET, FILM COATED ORAL 2 TIMES DAILY
Status: DISCONTINUED | OUTPATIENT
Start: 2023-04-07 | End: 2023-04-07

## 2023-04-07 RX ORDER — HYDROMORPHONE HYDROCHLORIDE 1 MG/ML
0.6 INJECTION, SOLUTION INTRAMUSCULAR; INTRAVENOUS; SUBCUTANEOUS EVERY 5 MIN PRN
Status: DISCONTINUED | OUTPATIENT
Start: 2023-04-07 | End: 2023-04-07 | Stop reason: HOSPADM

## 2023-04-07 RX ORDER — NEOSTIGMINE METHYLSULFATE 1 MG/ML
INJECTION, SOLUTION INTRAVENOUS AS NEEDED
Status: DISCONTINUED | OUTPATIENT
Start: 2023-04-07 | End: 2023-04-07 | Stop reason: SURG

## 2023-04-07 RX ORDER — PROCHLORPERAZINE EDISYLATE 5 MG/ML
5 INJECTION INTRAMUSCULAR; INTRAVENOUS EVERY 8 HOURS PRN
Status: DISCONTINUED | OUTPATIENT
Start: 2023-04-07 | End: 2023-04-07 | Stop reason: HOSPADM

## 2023-04-07 RX ORDER — METOCLOPRAMIDE HYDROCHLORIDE 5 MG/ML
INJECTION INTRAMUSCULAR; INTRAVENOUS AS NEEDED
Status: DISCONTINUED | OUTPATIENT
Start: 2023-04-07 | End: 2023-04-07 | Stop reason: SURG

## 2023-04-07 RX ORDER — PROCHLORPERAZINE EDISYLATE 5 MG/ML
INJECTION INTRAMUSCULAR; INTRAVENOUS
Status: COMPLETED
Start: 2023-04-07 | End: 2023-04-07

## 2023-04-07 RX ORDER — LIDOCAINE HYDROCHLORIDE 10 MG/ML
INJECTION, SOLUTION EPIDURAL; INFILTRATION; INTRACAUDAL; PERINEURAL AS NEEDED
Status: DISCONTINUED | OUTPATIENT
Start: 2023-04-07 | End: 2023-04-07 | Stop reason: SURG

## 2023-04-07 RX ORDER — MELATONIN
3 NIGHTLY PRN
Status: DISCONTINUED | OUTPATIENT
Start: 2023-04-07 | End: 2023-04-09

## 2023-04-07 RX ORDER — BUPIVACAINE HYDROCHLORIDE AND EPINEPHRINE 5; 5 MG/ML; UG/ML
INJECTION, SOLUTION EPIDURAL; INTRACAUDAL; PERINEURAL AS NEEDED
Status: DISCONTINUED | OUTPATIENT
Start: 2023-04-07 | End: 2023-04-07 | Stop reason: HOSPADM

## 2023-04-07 RX ORDER — SODIUM CHLORIDE 9 MG/ML
INJECTION, SOLUTION INTRAVENOUS CONTINUOUS
Status: DISCONTINUED | OUTPATIENT
Start: 2023-04-07 | End: 2023-04-09

## 2023-04-07 RX ORDER — HYDROMORPHONE HYDROCHLORIDE 1 MG/ML
0.4 INJECTION, SOLUTION INTRAMUSCULAR; INTRAVENOUS; SUBCUTANEOUS EVERY 5 MIN PRN
Status: DISCONTINUED | OUTPATIENT
Start: 2023-04-07 | End: 2023-04-07 | Stop reason: HOSPADM

## 2023-04-07 RX ORDER — BENZONATATE 100 MG/1
200 CAPSULE ORAL 3 TIMES DAILY PRN
Status: DISCONTINUED | OUTPATIENT
Start: 2023-04-07 | End: 2023-04-09

## 2023-04-07 RX ORDER — SODIUM CHLORIDE 9 MG/ML
INJECTION, SOLUTION INTRAVENOUS CONTINUOUS
Status: ACTIVE | OUTPATIENT
Start: 2023-04-07 | End: 2023-04-07

## 2023-04-07 RX ORDER — POLYETHYLENE GLYCOL 3350 17 G/17G
17 POWDER, FOR SOLUTION ORAL DAILY PRN
Status: DISCONTINUED | OUTPATIENT
Start: 2023-04-07 | End: 2023-04-09

## 2023-04-07 RX ORDER — HYDROCODONE BITARTRATE AND ACETAMINOPHEN 5; 325 MG/1; MG/1
1 TABLET ORAL EVERY 4 HOURS PRN
Status: DISCONTINUED | OUTPATIENT
Start: 2023-04-07 | End: 2023-04-09

## 2023-04-07 RX ORDER — ONDANSETRON 2 MG/ML
4 INJECTION INTRAMUSCULAR; INTRAVENOUS EVERY 4 HOURS PRN
Status: ACTIVE | OUTPATIENT
Start: 2023-04-07 | End: 2023-04-07

## 2023-04-07 RX ORDER — HYDROCODONE BITARTRATE AND ACETAMINOPHEN 5; 325 MG/1; MG/1
2 TABLET ORAL ONCE AS NEEDED
Status: DISCONTINUED | OUTPATIENT
Start: 2023-04-07 | End: 2023-04-07 | Stop reason: HOSPADM

## 2023-04-07 RX ORDER — HYDROCODONE BITARTRATE AND ACETAMINOPHEN 5; 325 MG/1; MG/1
2 TABLET ORAL EVERY 4 HOURS PRN
Status: DISCONTINUED | OUTPATIENT
Start: 2023-04-07 | End: 2023-04-09

## 2023-04-07 RX ORDER — HYDROCODONE BITARTRATE AND ACETAMINOPHEN 5; 325 MG/1; MG/1
1 TABLET ORAL ONCE AS NEEDED
Status: DISCONTINUED | OUTPATIENT
Start: 2023-04-07 | End: 2023-04-07 | Stop reason: HOSPADM

## 2023-04-07 RX ORDER — PHENYLEPHRINE HCL 10 MG/ML
VIAL (ML) INJECTION AS NEEDED
Status: DISCONTINUED | OUTPATIENT
Start: 2023-04-07 | End: 2023-04-07 | Stop reason: SURG

## 2023-04-07 RX ORDER — SENNA AND DOCUSATE SODIUM 50; 8.6 MG/1; MG/1
1 TABLET, FILM COATED ORAL DAILY
Qty: 30 TABLET | Refills: 0 | Status: SHIPPED | OUTPATIENT
Start: 2023-04-07

## 2023-04-07 RX ORDER — MIDAZOLAM HYDROCHLORIDE 1 MG/ML
INJECTION INTRAMUSCULAR; INTRAVENOUS AS NEEDED
Status: DISCONTINUED | OUTPATIENT
Start: 2023-04-07 | End: 2023-04-07 | Stop reason: SURG

## 2023-04-07 RX ORDER — MORPHINE SULFATE 4 MG/ML
4 INJECTION, SOLUTION INTRAMUSCULAR; INTRAVENOUS EVERY 2 HOUR PRN
Status: DISCONTINUED | OUTPATIENT
Start: 2023-04-07 | End: 2023-04-09

## 2023-04-07 RX ORDER — OXYCODONE HYDROCHLORIDE 5 MG/1
5 TABLET ORAL EVERY 6 HOURS PRN
Qty: 20 TABLET | Refills: 0 | Status: SHIPPED | OUTPATIENT
Start: 2023-04-07

## 2023-04-07 RX ORDER — MORPHINE SULFATE 2 MG/ML
1 INJECTION, SOLUTION INTRAMUSCULAR; INTRAVENOUS EVERY 2 HOUR PRN
Status: DISCONTINUED | OUTPATIENT
Start: 2023-04-07 | End: 2023-04-09

## 2023-04-07 RX ORDER — ACETAMINOPHEN 325 MG/1
650 TABLET ORAL EVERY 4 HOURS PRN
Status: DISCONTINUED | OUTPATIENT
Start: 2023-04-07 | End: 2023-04-09

## 2023-04-07 RX ORDER — ESMOLOL HYDROCHLORIDE 10 MG/ML
INJECTION INTRAVENOUS AS NEEDED
Status: DISCONTINUED | OUTPATIENT
Start: 2023-04-07 | End: 2023-04-07 | Stop reason: SURG

## 2023-04-07 RX ORDER — BISACODYL 10 MG
10 SUPPOSITORY, RECTAL RECTAL
Status: DISCONTINUED | OUTPATIENT
Start: 2023-04-07 | End: 2023-04-09

## 2023-04-07 RX ORDER — KETOROLAC TROMETHAMINE 30 MG/ML
INJECTION, SOLUTION INTRAMUSCULAR; INTRAVENOUS AS NEEDED
Status: DISCONTINUED | OUTPATIENT
Start: 2023-04-07 | End: 2023-04-07 | Stop reason: SURG

## 2023-04-07 RX ORDER — SODIUM PHOSPHATE, DIBASIC AND SODIUM PHOSPHATE, MONOBASIC 7; 19 G/133ML; G/133ML
1 ENEMA RECTAL ONCE AS NEEDED
Status: DISCONTINUED | OUTPATIENT
Start: 2023-04-07 | End: 2023-04-09

## 2023-04-07 RX ORDER — ONDANSETRON 2 MG/ML
4 INJECTION INTRAMUSCULAR; INTRAVENOUS EVERY 6 HOURS PRN
Status: DISCONTINUED | OUTPATIENT
Start: 2023-04-07 | End: 2023-04-09

## 2023-04-07 RX ORDER — IRBESARTAN AND HYDROCHLOROTHIAZIDE 150; 12.5 MG/1; MG/1
1 TABLET, FILM COATED ORAL DAILY
Status: DISCONTINUED | OUTPATIENT
Start: 2023-04-08 | End: 2023-04-09

## 2023-04-07 RX ORDER — MORPHINE SULFATE 2 MG/ML
2 INJECTION, SOLUTION INTRAMUSCULAR; INTRAVENOUS EVERY 2 HOUR PRN
Status: DISCONTINUED | OUTPATIENT
Start: 2023-04-07 | End: 2023-04-09

## 2023-04-07 RX ORDER — SODIUM CHLORIDE, SODIUM LACTATE, POTASSIUM CHLORIDE, CALCIUM CHLORIDE 600; 310; 30; 20 MG/100ML; MG/100ML; MG/100ML; MG/100ML
INJECTION, SOLUTION INTRAVENOUS CONTINUOUS
Status: DISCONTINUED | OUTPATIENT
Start: 2023-04-07 | End: 2023-04-07 | Stop reason: HOSPADM

## 2023-04-07 RX ORDER — SENNOSIDES 8.6 MG
17.2 TABLET ORAL NIGHTLY PRN
Status: DISCONTINUED | OUTPATIENT
Start: 2023-04-07 | End: 2023-04-09

## 2023-04-07 RX ORDER — ROCURONIUM BROMIDE 10 MG/ML
INJECTION, SOLUTION INTRAVENOUS AS NEEDED
Status: DISCONTINUED | OUTPATIENT
Start: 2023-04-07 | End: 2023-04-07 | Stop reason: SURG

## 2023-04-07 RX ORDER — ONDANSETRON 2 MG/ML
4 INJECTION INTRAMUSCULAR; INTRAVENOUS EVERY 6 HOURS PRN
Status: DISCONTINUED | OUTPATIENT
Start: 2023-04-07 | End: 2023-04-07 | Stop reason: HOSPADM

## 2023-04-07 RX ORDER — ACETAMINOPHEN 500 MG
1000 TABLET ORAL ONCE AS NEEDED
Status: DISCONTINUED | OUTPATIENT
Start: 2023-04-07 | End: 2023-04-07 | Stop reason: HOSPADM

## 2023-04-07 RX ORDER — PROCHLORPERAZINE EDISYLATE 5 MG/ML
5 INJECTION INTRAMUSCULAR; INTRAVENOUS EVERY 8 HOURS PRN
Status: DISCONTINUED | OUTPATIENT
Start: 2023-04-07 | End: 2023-04-09

## 2023-04-07 RX ORDER — GLYCOPYRROLATE 0.2 MG/ML
INJECTION, SOLUTION INTRAMUSCULAR; INTRAVENOUS AS NEEDED
Status: DISCONTINUED | OUTPATIENT
Start: 2023-04-07 | End: 2023-04-07 | Stop reason: SURG

## 2023-04-07 RX ADMIN — GLYCOPYRROLATE 0.6 MG: 0.2 INJECTION, SOLUTION INTRAMUSCULAR; INTRAVENOUS at 19:29:00

## 2023-04-07 RX ADMIN — ONDANSETRON 4 MG: 2 INJECTION INTRAMUSCULAR; INTRAVENOUS at 18:15:00

## 2023-04-07 RX ADMIN — SODIUM CHLORIDE: 9 INJECTION, SOLUTION INTRAVENOUS at 19:34:00

## 2023-04-07 RX ADMIN — KETOROLAC TROMETHAMINE 30 MG: 30 INJECTION, SOLUTION INTRAMUSCULAR; INTRAVENOUS at 19:29:00

## 2023-04-07 RX ADMIN — DEXAMETHASONE SODIUM PHOSPHATE 8 MG: 4 MG/ML VIAL (ML) INJECTION at 18:15:00

## 2023-04-07 RX ADMIN — METOCLOPRAMIDE HYDROCHLORIDE 10 MG: 5 INJECTION INTRAMUSCULAR; INTRAVENOUS at 18:15:00

## 2023-04-07 RX ADMIN — ROCURONIUM BROMIDE 45 MG: 10 INJECTION, SOLUTION INTRAVENOUS at 18:11:00

## 2023-04-07 RX ADMIN — PHENYLEPHRINE HCL 100 MCG: 10 MG/ML VIAL (ML) INJECTION at 18:22:00

## 2023-04-07 RX ADMIN — MIDAZOLAM HYDROCHLORIDE 2 MG: 1 INJECTION INTRAMUSCULAR; INTRAVENOUS at 18:03:00

## 2023-04-07 RX ADMIN — NEOSTIGMINE METHYLSULFATE 3 MG: 1 INJECTION, SOLUTION INTRAVENOUS at 19:29:00

## 2023-04-07 RX ADMIN — ROCURONIUM BROMIDE 5 MG: 10 INJECTION, SOLUTION INTRAVENOUS at 18:07:00

## 2023-04-07 RX ADMIN — CEFOXITIN 2 G: 1 INJECTION, POWDER, FOR SOLUTION INTRAVENOUS at 18:15:00

## 2023-04-07 RX ADMIN — PHENYLEPHRINE HCL 100 MCG: 10 MG/ML VIAL (ML) INJECTION at 18:23:00

## 2023-04-07 RX ADMIN — SODIUM CHLORIDE: 9 INJECTION, SOLUTION INTRAVENOUS at 18:03:00

## 2023-04-07 RX ADMIN — LIDOCAINE HYDROCHLORIDE 50 MG: 10 INJECTION, SOLUTION EPIDURAL; INFILTRATION; INTRACAUDAL; PERINEURAL at 18:03:00

## 2023-04-07 RX ADMIN — ESMOLOL HYDROCHLORIDE 30 MG: 10 INJECTION INTRAVENOUS at 18:14:00

## 2023-04-07 NOTE — ANESTHESIA PROCEDURE NOTES
Airway  Date/Time: 4/7/2023 6:09 PM  Urgency: elective      General Information and Staff    Patient location during procedure: OR  Anesthesiologist: Evon De La Rosa MD  Performed: anesthesiologist   Performed by: Evon De La Rosa MD  Authorized by: Evon De La Rosa MD      Indications and Patient Condition  Indications for airway management: anesthesia  Spontaneous Ventilation: absent  Sedation level: deep  Preoxygenated: yes  Patient position: sniffing  Mask difficulty assessment: 1 - vent by mask    Final Airway Details  Final airway type: endotracheal airway      Successful airway: ETT  Cuffed: yes   Successful intubation technique: Video laryngoscopy  Endotracheal tube insertion site: oral  Blade: GlideScope  Blade size: #3  ETT size (mm): 7.0    Placement verified by: chest auscultation and capnometry   Cuff volume (mL): 5  Measured from: lips  ETT to lips (cm): 22  Number of attempts at approach: 1    Additional Comments  VC clean, passed smoothly, atraumatically. OGT and soft bite block to molars.  Dentition unchanged

## 2023-04-07 NOTE — PLAN OF CARE
Pt c/o pain relating to gallstones, pt is alert and oriented x4, VSS, room air, IV fluids running as ordered, up ad karen, PO meds for pain see MAR, plan for lap fannie today at 1830, all safety measures in place, call light within reach. Will continue to monitor.

## 2023-04-07 NOTE — ED QUICK NOTES
Orders for admission, patient is aware of plan and ready to go upstairs. Any questions, please call ED RN Kya Cruz at extension [phone].      Patient Covid vaccination status: Fully vaccinated     COVID Test Ordered in ED: Rapid SARS-CoV-2 by PCR    COVID Suspicion at Admission: Low clinical suspicion for COVID    Running Infusions:  None    Mental Status/LOC at time of transport: Alert    Other pertinent information:   CIWA score: N/A   NIH score:  N/A

## 2023-04-07 NOTE — ED INITIAL ASSESSMENT (HPI)
Pt arrives to ed ambulatory with c/o abdominal pain since Saturday. Pt reports doing an enema Wednesday with little relief, and has not had a good bowel movement since. Pt endorses nausea and vomiting as well.

## 2023-04-08 LAB
ALBUMIN SERPL-MCNC: 3.1 G/DL (ref 3.4–5)
ALBUMIN/GLOB SERPL: 0.8 {RATIO} (ref 1–2)
ALP LIVER SERPL-CCNC: 74 U/L
ALT SERPL-CCNC: 23 U/L
ANION GAP SERPL CALC-SCNC: 5 MMOL/L (ref 0–18)
AST SERPL-CCNC: 25 U/L (ref 15–37)
BASOPHILS # BLD AUTO: 0.01 X10(3) UL (ref 0–0.2)
BASOPHILS NFR BLD AUTO: 0.1 %
BILIRUB SERPL-MCNC: 1.1 MG/DL (ref 0.1–2)
BUN BLD-MCNC: 13 MG/DL (ref 7–18)
CALCIUM BLD-MCNC: 9.3 MG/DL (ref 8.5–10.1)
CHLORIDE SERPL-SCNC: 106 MMOL/L (ref 98–112)
CO2 SERPL-SCNC: 26 MMOL/L (ref 21–32)
CREAT BLD-MCNC: 0.62 MG/DL
EOSINOPHIL # BLD AUTO: 0 X10(3) UL (ref 0–0.7)
EOSINOPHIL NFR BLD AUTO: 0 %
ERYTHROCYTE [DISTWIDTH] IN BLOOD BY AUTOMATED COUNT: 11.1 %
GFR SERPLBLD BASED ON 1.73 SQ M-ARVRAT: 108 ML/MIN/1.73M2 (ref 60–?)
GLOBULIN PLAS-MCNC: 3.9 G/DL (ref 2.8–4.4)
GLUCOSE BLD-MCNC: 164 MG/DL (ref 70–99)
HCT VFR BLD AUTO: 39 %
HGB BLD-MCNC: 13.1 G/DL
IMM GRANULOCYTES # BLD AUTO: 0.02 X10(3) UL (ref 0–1)
IMM GRANULOCYTES NFR BLD: 0.3 %
LYMPHOCYTES # BLD AUTO: 0.72 X10(3) UL (ref 1–4)
LYMPHOCYTES NFR BLD AUTO: 9.6 %
MCH RBC QN AUTO: 28.9 PG (ref 26–34)
MCHC RBC AUTO-ENTMCNC: 33.6 G/DL (ref 31–37)
MCV RBC AUTO: 86.1 FL
MONOCYTES # BLD AUTO: 0.41 X10(3) UL (ref 0.1–1)
MONOCYTES NFR BLD AUTO: 5.4 %
NEUTROPHILS # BLD AUTO: 6.37 X10 (3) UL (ref 1.5–7.7)
NEUTROPHILS # BLD AUTO: 6.37 X10(3) UL (ref 1.5–7.7)
NEUTROPHILS NFR BLD AUTO: 84.6 %
OSMOLALITY SERPL CALC.SUM OF ELEC: 288 MOSM/KG (ref 275–295)
PLATELET # BLD AUTO: 244 10(3)UL (ref 150–450)
POTASSIUM SERPL-SCNC: 4 MMOL/L (ref 3.5–5.1)
PROT SERPL-MCNC: 7 G/DL (ref 6.4–8.2)
RBC # BLD AUTO: 4.53 X10(6)UL
SODIUM SERPL-SCNC: 137 MMOL/L (ref 136–145)
WBC # BLD AUTO: 7.5 X10(3) UL (ref 4–11)

## 2023-04-08 PROCEDURE — 99232 SBSQ HOSP IP/OBS MODERATE 35: CPT | Performed by: HOSPITALIST

## 2023-04-08 RX ORDER — METOCLOPRAMIDE HYDROCHLORIDE 5 MG/ML
10 INJECTION INTRAMUSCULAR; INTRAVENOUS ONCE
Status: COMPLETED | OUTPATIENT
Start: 2023-04-08 | End: 2023-04-08

## 2023-04-08 RX ORDER — METOCLOPRAMIDE 10 MG/1
10 TABLET ORAL EVERY 6 HOURS PRN
Status: DISCONTINUED | OUTPATIENT
Start: 2023-04-08 | End: 2023-04-09

## 2023-04-08 RX ORDER — METOCLOPRAMIDE HYDROCHLORIDE 5 MG/ML
10 INJECTION INTRAMUSCULAR; INTRAVENOUS EVERY 6 HOURS PRN
Status: DISCONTINUED | OUTPATIENT
Start: 2023-04-08 | End: 2023-04-09

## 2023-04-08 NOTE — PLAN OF CARE
POD 1 Francisca Muñoz, Pt is AAOX4, VSS, room air, IV SL, up min assist, incisions remain closed and CDI, no pain noted at this time, Pt had three separate occurrences of nausea, antiemetics given, plan for discharge home if nausea resolves, Pt doing well, all needs met, all safety measures in place, call light within reach, will CTM.

## 2023-04-08 NOTE — PLAN OF CARE
Multiple episodes of emesis. Medications given, see MAR, IVF restarted, Pt feeling full and constipated, suppository given. Will CTM.

## 2023-04-08 NOTE — BRIEF OP NOTE
Pre-Operative Diagnosis: Acute Cholecystitis [K81.9]     Post-Operative Diagnosis: Acute cholecystitis     Procedure Performed:   LAPAROSCOPIC CHOLECYSTECTOMY WITH INTRAOPERATIVE CHOLANGIOGRAM    Surgeon(s) and Role:     * Lieutenant Oz Nathan MD - Primary    Assistant(s):        Surgical Findings: severely inflamed gallbladder with stones; normal IOC     Specimen: gallbladder with stones.       Estimated Blood Loss: Blood Output: 5 mL (4/7/2023  7:19 PM)      Dictation Number:      Richie Kevin MD  4/7/2023  7:24 PM

## 2023-04-08 NOTE — OPERATIVE REPORT
OPERATIVE REPORT   PREOPERATIVE DIAGNOSIS: Acute Cholecystitis and cholelithiasis. POSTOPERATIVE DIAGNOSIS: Acute Cholecystitis and cholelithiasis. PROCEDURE PERFORMED: Laparoscopic cholecystectomy with intraoperative cholangiogram.   ASSISTANT: Ms Espinoza Tavia. ANESTHESIA: General endotracheal anesthesia. Anesthesiologist.: Richard Cabrera MD  BLOOD LOSS: 5 mL. COMPLICATIONS: None apparent. FINDINGS:   1. Severe acute inflammation of the gallbladder  2. Normal IOC    DISPOSITION: The patient was awakened from anesthesia and brought to the recovery room in stable condition having tolerated the procedure without apparent complication. Needle, sponge, and instrument counts were correct at the end of the procedure. Please note, preprocedure antibiotic and DVT prophylaxis were administered per protocol, and a time-out was performed prior to initiating operation, identifying the correct patient, procedure, and surgeon. INDICATIONS FOR PROCEDURE: Please review the preprocedural history and physical. Briefly, the patient is a 48year old female who now presents for cholecystectomy. The risks, benefits, and alternatives of surgical intervention were explained to the patient in detail including but not limited to bleeding, infection, recurrence, incorrect diagnosis, injury to adjacent organs and structures, and bile duct injury requiring possible immediate or delayed reconstruction potentially by a hepatobiliary surgeon, postoperative bile leak with retained common bile duct stone, the need for post-procedure ERCP as well as the need for further therapeutic diagnostic or surgical intervention. The possibility of conversion to open procedure was also explained to the patient. The patient did voice understanding. Allpertinent questions were answered to the patient's satisfaction after which the patient provided willing and informed consent to proceed with surgery.    PROCEDURE:   NOTE[de-identified] A surgical assistant was absolutely essential to the proper conduct of this case, particularly in the performance of the cholangiogram, as well as the careful dissection necessary in and around the hilum of the gallbladder. DESCRIPTION OF PROCEDURE: The patient was brought to the operating room, and, after the induction of general endotracheal anesthesia, the abdomen was prepped and draped in the usual sterile fashion. The umbilicus was grasped and elevated with an Allis clamp and two perforating towel clips, and an 11-mm incision was made in the superior aspect of the umbilicus through which a Veress needle was placed. A pneumoperitoneum was established in usual manner. Next, an 11-mm trocar and a 10-mm laparoscope were placed into the abdomen. Brief diagnostic survey revealed no other acute pathology. Attention was then turned to the right upper quadrant. The patient was positioned with head up, right side up, and three 5-mm incisions were made in the upper abdomen, one in the subxiphoid position and two in the subcostal region, through which 5-mm trocars were placed into the abdomen. The gallbladder was grasped, elevated, and retracted. Dissection was initiated in the triangle of Calot, with the cystic duct and cystic artery being identified superior to the line of Rouviere, and a critical view was obtained. Next, a clip was placed on the specimen side of the cystic duct, and a cystic ductotomy was created through which a cholangiocatheter was introduced, and a cholangiogram was performed. The cholangiogram reveals brisk and immediate contrast excretion into the duodenum. The cystic duct, common bile duct, hepatic duct, intrahepatic biliary radicals were all free of lesions, stenoses, strictures, filling defects, or other abnormalities.  Representative images were submitted to the radiologist. The cholangiocatheter was removed, and three clips were placed on the patient's side of the cystic duct, which was divided by Endoshears. Next, one clip was placed on the cystic artery on the specimen side, three towards the patient side, and the cystic artery was divided with the Endoshears. Next, the gallbladder was elevated from the gallbladder fossa using electrocautery. Once the gallbladder was elevated from the gallbladder fossa, it was extracted from the umbilical trocar site and sent to Pathology for specimen. Attention was turned to achieve hemostasis on the gallbladder fossa; this was achieved with electrocautery. The right upper quadrant was then copiously irrigated until the effluent fluid was clear. The previously placed clips on the cystic duct and cystic artery were visualized and inspected; they were well approximated, well situated, and there was no evidence of active bleeding or bile leak. At this point, the pneumoperitoneum was released, and the upper trocars were removed under vision. The laparoscope was removed from the umbilicus, as was the trocar. The fascia at the umbilicus was reapproximated using 0 Maxon suture. All skin incisions were cleansed, irrigated, and injected with local anesthetic. Skin incisions were reapproximated using subcuticular 4-0 Vicryl suture. Dermabond was used to seal all the incisions. The patient was awakened from anesthesia and brought to the recovery room in stable condition, having tolerated the procedure without apparent complication. Operative findings were discussed with the patient's family immediately upon conclusion of the procedure.

## 2023-04-08 NOTE — PLAN OF CARE
Pt arrived to the floor from PACU around 2130, assumed care of pt at that time. Pt AOx4, saturating well on RA, VSS. No complaints of pain at this time. Lap sites to abdomen with dermabond. Pt tolerating regular diet. Voided in bathroom with no issue. Up with standby assist.   Plan is for pt to discharge today. Pt updated and agreeable to POC. Call light within reach, questions answered and needs met.

## 2023-04-09 VITALS
WEIGHT: 223 LBS | BODY MASS INDEX: 42 KG/M2 | RESPIRATION RATE: 17 BRPM | HEART RATE: 94 BPM | DIASTOLIC BLOOD PRESSURE: 74 MMHG | TEMPERATURE: 98 F | OXYGEN SATURATION: 92 % | SYSTOLIC BLOOD PRESSURE: 137 MMHG

## 2023-04-09 LAB
ATRIAL RATE: 87 BPM
P AXIS: 63 DEGREES
P-R INTERVAL: 162 MS
Q-T INTERVAL: 368 MS
QRS DURATION: 90 MS
QTC CALCULATION (BEZET): 442 MS
R AXIS: -27 DEGREES
T AXIS: 30 DEGREES
VENTRICULAR RATE: 87 BPM

## 2023-04-09 PROCEDURE — 99239 HOSP IP/OBS DSCHRG MGMT >30: CPT | Performed by: HOSPITALIST

## 2023-04-09 NOTE — PLAN OF CARE
Received patient at [phone]. Alert and oriented x4. Room air. No tele. Last bowel movement on 4/5. Miralax and suppository given. Up ad karen. IV fluids restarted due to multiple episodes of emesis. Regular low fat diet. EKG completed per orders. Plan is for patient to discharge home when medically stable.

## 2023-04-09 NOTE — PLAN OF CARE
Pt received A/OX4. Resting in bed. RA. VSS. Abd lap incisions with dermabond MARIPOSA C/D/I. Denies any pain at present. Reports just taking sips at this time. Denies any nausea. No emesis since this evening. IVF infusing per orders. Will cont to monitor.

## 2023-04-09 NOTE — PLAN OF CARE
Discharge instructions reviewed with patient. All questions answered. IV removed. Bedside belongings packed up and returned to patient.

## 2023-04-09 NOTE — PLAN OF CARE
POD 2 lobo greco, pt is alert and oriented x4, VSS, room air, IV fluids running as ordered, incisions with dermabond intact, up ad karen, PO meds for pain see MAR, plan is to discharge today if no complaints of nausea, tolerated food well this AM, all safety measures in place, call light within reach. Will continue to monitor.

## 2023-04-10 ENCOUNTER — PATIENT OUTREACH (OUTPATIENT)
Dept: CASE MANAGEMENT | Age: 53
End: 2023-04-10

## 2023-04-26 ENCOUNTER — OFFICE VISIT (OUTPATIENT)
Facility: LOCATION | Age: 53
End: 2023-04-26

## 2023-04-26 VITALS — TEMPERATURE: 98 F | HEART RATE: 85 BPM

## 2023-04-26 DIAGNOSIS — K81.1 CHRONIC CHOLECYSTITIS: Primary | ICD-10-CM

## 2023-04-26 PROCEDURE — 99024 POSTOP FOLLOW-UP VISIT: CPT | Performed by: SURGERY

## 2023-05-16 ENCOUNTER — WALK IN (OUTPATIENT)
Dept: URGENT CARE | Age: 53
End: 2023-05-16

## 2023-05-16 VITALS
HEART RATE: 88 BPM | TEMPERATURE: 98.5 F | BODY MASS INDEX: 38.51 KG/M2 | DIASTOLIC BLOOD PRESSURE: 84 MMHG | HEIGHT: 61 IN | SYSTOLIC BLOOD PRESSURE: 146 MMHG | OXYGEN SATURATION: 100 % | WEIGHT: 204 LBS | RESPIRATION RATE: 18 BRPM

## 2023-05-16 DIAGNOSIS — Z20.818 EXPOSURE TO STREP THROAT: ICD-10-CM

## 2023-05-16 DIAGNOSIS — R21 RASH AND NONSPECIFIC SKIN ERUPTION: Primary | ICD-10-CM

## 2023-05-16 LAB
INTERNAL PROCEDURAL CONTROLS ACCEPTABLE: YES
S PYO AG THROAT QL IA.RAPID: NEGATIVE
TEST LOT EXPIRATION DATE: NORMAL
TEST LOT NUMBER: NORMAL

## 2023-05-16 PROCEDURE — 3077F SYST BP >= 140 MM HG: CPT | Performed by: NURSE PRACTITIONER

## 2023-05-16 PROCEDURE — 87880 STREP A ASSAY W/OPTIC: CPT | Performed by: NURSE PRACTITIONER

## 2023-05-16 PROCEDURE — 99213 OFFICE O/P EST LOW 20 MIN: CPT | Performed by: NURSE PRACTITIONER

## 2023-05-16 PROCEDURE — 3079F DIAST BP 80-89 MM HG: CPT | Performed by: NURSE PRACTITIONER

## 2023-05-16 RX ORDER — PREDNISONE 20 MG/1
40 TABLET ORAL DAILY
Qty: 10 TABLET | Refills: 0 | Status: SHIPPED | OUTPATIENT
Start: 2023-05-16 | End: 2023-05-21

## 2023-05-16 RX ORDER — CEPHALEXIN 500 MG/1
500 CAPSULE ORAL 2 TIMES DAILY
Qty: 14 CAPSULE | Refills: 0 | Status: SHIPPED | OUTPATIENT
Start: 2023-05-16 | End: 2023-05-23

## 2023-05-16 ASSESSMENT — ENCOUNTER SYMPTOMS
CHEST TIGHTNESS: 0
CHILLS: 0
FATIGUE: 0
SHORTNESS OF BREATH: 0
COUGH: 0
ABDOMINAL PAIN: 0
VOMITING: 0
DIARRHEA: 0
RHINORRHEA: 0
FEVER: 0
SORE THROAT: 0
NAUSEA: 0

## 2023-05-16 ASSESSMENT — PAIN SCALES - GENERAL: PAINLEVEL: 0

## 2023-08-01 ENCOUNTER — APPOINTMENT (OUTPATIENT)
Dept: URBAN - METROPOLITAN AREA CLINIC 242 | Age: 53
Setting detail: DERMATOLOGY
End: 2023-08-01

## 2023-08-01 DIAGNOSIS — L20.89 OTHER ATOPIC DERMATITIS: ICD-10-CM

## 2023-08-01 PROCEDURE — OTHER COUNSELING: OTHER

## 2023-08-01 PROCEDURE — OTHER PRESCRIPTION: OTHER

## 2023-08-01 PROCEDURE — 99203 OFFICE O/P NEW LOW 30 MIN: CPT

## 2023-08-01 PROCEDURE — OTHER PRESCRIPTION MEDICATION MANAGEMENT: OTHER

## 2023-08-01 RX ORDER — TRIAMCINOLONE ACETONIDE 1 MG/G
CREAM TOPICAL
Qty: 30 | Refills: 0 | Status: ERX | COMMUNITY
Start: 2023-08-01

## 2023-08-01 ASSESSMENT — LOCATION DETAILED DESCRIPTION DERM: LOCATION DETAILED: RIGHT INFERIOR CENTRAL MALAR CHEEK

## 2023-08-01 ASSESSMENT — LOCATION SIMPLE DESCRIPTION DERM: LOCATION SIMPLE: RIGHT CHEEK

## 2023-08-01 ASSESSMENT — LOCATION ZONE DERM: LOCATION ZONE: FACE

## 2023-08-01 NOTE — PROCEDURE: PRESCRIPTION MEDICATION MANAGEMENT
Initiate Treatment: TAC 0.1% cream BID for 2 weeks then d/c
Detail Level: Zone
Render In Strict Bullet Format?: No

## 2023-09-22 ENCOUNTER — TELEPHONE (OUTPATIENT)
Dept: INTERNAL MEDICINE CLINIC | Facility: CLINIC | Age: 53
End: 2023-09-22

## 2023-09-22 DIAGNOSIS — Z13.220 SCREENING FOR LIPID DISORDERS: ICD-10-CM

## 2023-09-22 DIAGNOSIS — Z13.0 SCREENING FOR DISORDER OF BLOOD AND BLOOD-FORMING ORGANS: ICD-10-CM

## 2023-09-22 DIAGNOSIS — Z00.00 ROUTINE GENERAL MEDICAL EXAMINATION AT A HEALTH CARE FACILITY: Primary | ICD-10-CM

## 2023-09-22 DIAGNOSIS — R73.03 PREDIABETES: ICD-10-CM

## 2023-09-22 DIAGNOSIS — Z13.228 SCREENING FOR METABOLIC DISORDER: ICD-10-CM

## 2023-09-22 DIAGNOSIS — Z13.29 SCREENING FOR THYROID DISORDER: ICD-10-CM

## 2023-09-22 NOTE — TELEPHONE ENCOUNTER
Future Appointments   Date Time Provider Glenda Evelyn   10/25/2023  4:00 PM Annita Harris, APRN [facility]     Informed must fast no call back required.  Orders to [place]

## 2023-10-03 RX ORDER — IRBESARTAN AND HYDROCHLOROTHIAZIDE 150; 12.5 MG/1; MG/1
1 TABLET, FILM COATED ORAL DAILY
Qty: 90 TABLET | Refills: 0 | Status: SHIPPED | OUTPATIENT
Start: 2023-10-03

## 2023-10-03 NOTE — TELEPHONE ENCOUNTER
Last VISIT Acute  11/15/22 SD    Last CPE Wellness 11/22/21 SD    Last REFILL  Medication Quantity Refills Start End   Irbesartan-hydroCHLOROthiazide 150-12.5 MG Oral Tab 180 tablet 1 3/24/2023    Sig:   Take 1 tablet by mouth 2 (two) times daily. Route:   Oral       Last LABS  Cmp, CBC, 04/08/23  Future Appointments   Date Time Provider Glenda Rivas   10/25/2023  4:00 PM HEENA Hernandez [facility]         Per PROTOCOL? Please Approve or Deny.

## 2023-10-14 ENCOUNTER — LAB ENCOUNTER (OUTPATIENT)
Dept: LAB | Facility: HOSPITAL | Age: 53
End: 2023-10-14
Attending: NURSE PRACTITIONER
Payer: COMMERCIAL

## 2023-10-14 DIAGNOSIS — Z00.00 ROUTINE GENERAL MEDICAL EXAMINATION AT A HEALTH CARE FACILITY: ICD-10-CM

## 2023-10-14 DIAGNOSIS — Z13.0 SCREENING FOR DISORDER OF BLOOD AND BLOOD-FORMING ORGANS: ICD-10-CM

## 2023-10-14 DIAGNOSIS — Z13.29 SCREENING FOR THYROID DISORDER: ICD-10-CM

## 2023-10-14 DIAGNOSIS — Z13.228 SCREENING FOR METABOLIC DISORDER: ICD-10-CM

## 2023-10-14 DIAGNOSIS — Z13.220 SCREENING FOR LIPID DISORDERS: ICD-10-CM

## 2023-10-14 DIAGNOSIS — R73.03 PREDIABETES: ICD-10-CM

## 2023-10-14 LAB
ALBUMIN SERPL-MCNC: 3.2 G/DL (ref 3.4–5)
ALBUMIN/GLOB SERPL: 0.8 {RATIO} (ref 1–2)
ALP LIVER SERPL-CCNC: 127 U/L
ALT SERPL-CCNC: 13 U/L
ANION GAP SERPL CALC-SCNC: 2 MMOL/L (ref 0–18)
AST SERPL-CCNC: 19 U/L (ref 15–37)
BASOPHILS # BLD AUTO: 0.03 X10(3) UL (ref 0–0.2)
BASOPHILS NFR BLD AUTO: 0.6 %
BILIRUB SERPL-MCNC: 0.7 MG/DL (ref 0.1–2)
BUN BLD-MCNC: 19 MG/DL (ref 7–18)
CALCIUM BLD-MCNC: 8.7 MG/DL (ref 8.5–10.1)
CHLORIDE SERPL-SCNC: 107 MMOL/L (ref 98–112)
CHOLEST SERPL-MCNC: 198 MG/DL (ref ?–200)
CO2 SERPL-SCNC: 31 MMOL/L (ref 21–32)
CREAT BLD-MCNC: 0.82 MG/DL
EGFRCR SERPLBLD CKD-EPI 2021: 87 ML/MIN/1.73M2 (ref 60–?)
EOSINOPHIL # BLD AUTO: 0.31 X10(3) UL (ref 0–0.7)
EOSINOPHIL NFR BLD AUTO: 5.9 %
ERYTHROCYTE [DISTWIDTH] IN BLOOD BY AUTOMATED COUNT: 12.8 %
FASTING PATIENT LIPID ANSWER: YES
FASTING STATUS PATIENT QL REPORTED: YES
GLOBULIN PLAS-MCNC: 3.9 G/DL (ref 2.8–4.4)
GLUCOSE BLD-MCNC: 90 MG/DL (ref 70–99)
HCT VFR BLD AUTO: 37.8 %
HDLC SERPL-MCNC: 86 MG/DL (ref 40–59)
HGB BLD-MCNC: 13 G/DL
IMM GRANULOCYTES # BLD AUTO: 0.01 X10(3) UL (ref 0–1)
IMM GRANULOCYTES NFR BLD: 0.2 %
LDLC SERPL CALC-MCNC: 105 MG/DL (ref ?–100)
LYMPHOCYTES # BLD AUTO: 2.57 X10(3) UL (ref 1–4)
LYMPHOCYTES NFR BLD AUTO: 48.7 %
MCH RBC QN AUTO: 29 PG (ref 26–34)
MCHC RBC AUTO-ENTMCNC: 34.4 G/DL (ref 31–37)
MCV RBC AUTO: 84.4 FL
MONOCYTES # BLD AUTO: 0.41 X10(3) UL (ref 0.1–1)
MONOCYTES NFR BLD AUTO: 7.8 %
NEUTROPHILS # BLD AUTO: 1.95 X10 (3) UL (ref 1.5–7.7)
NEUTROPHILS # BLD AUTO: 1.95 X10(3) UL (ref 1.5–7.7)
NEUTROPHILS NFR BLD AUTO: 36.8 %
NONHDLC SERPL-MCNC: 112 MG/DL (ref ?–130)
OSMOLALITY SERPL CALC.SUM OF ELEC: 292 MOSM/KG (ref 275–295)
PLATELET # BLD AUTO: 257 10(3)UL (ref 150–450)
POTASSIUM SERPL-SCNC: 4.4 MMOL/L (ref 3.5–5.1)
PROT SERPL-MCNC: 7.1 G/DL (ref 6.4–8.2)
RBC # BLD AUTO: 4.48 X10(6)UL
SODIUM SERPL-SCNC: 140 MMOL/L (ref 136–145)
TRIGL SERPL-MCNC: 38 MG/DL (ref 30–149)
TSI SER-ACNC: 1.02 MIU/ML (ref 0.36–3.74)
VLDLC SERPL CALC-MCNC: 6 MG/DL (ref 0–30)
WBC # BLD AUTO: 5.3 X10(3) UL (ref 4–11)

## 2023-10-14 PROCEDURE — 80053 COMPREHEN METABOLIC PANEL: CPT

## 2023-10-14 PROCEDURE — 83036 HEMOGLOBIN GLYCOSYLATED A1C: CPT

## 2023-10-14 PROCEDURE — 36415 COLL VENOUS BLD VENIPUNCTURE: CPT

## 2023-10-14 PROCEDURE — 84443 ASSAY THYROID STIM HORMONE: CPT

## 2023-10-14 PROCEDURE — 85025 COMPLETE CBC W/AUTO DIFF WBC: CPT

## 2023-10-14 PROCEDURE — 80061 LIPID PANEL: CPT

## 2023-10-15 LAB — HGBA1C: 5.8 %

## 2023-11-13 ENCOUNTER — TELEPHONE (OUTPATIENT)
Dept: INTERNAL MEDICINE CLINIC | Facility: CLINIC | Age: 53
End: 2023-11-13

## 2023-11-13 DIAGNOSIS — Z01.84 IMMUNITY STATUS TESTING: Primary | ICD-10-CM

## 2023-11-13 NOTE — TELEPHONE ENCOUNTER
Pt called stating she is going back to school and needs to get MMR but she is allergic to neomycin which is in the MMR-was suggested she get a titer or an exemption letter stating she cannot get the MMR-will SD order the titer for her?

## 2023-11-15 ENCOUNTER — LAB ENCOUNTER (OUTPATIENT)
Dept: LAB | Age: 53
End: 2023-11-15
Attending: NURSE PRACTITIONER
Payer: COMMERCIAL

## 2023-11-15 DIAGNOSIS — Z01.84 IMMUNITY STATUS TESTING: ICD-10-CM

## 2023-11-15 LAB
RUBV IGG SER QL: POSITIVE
RUBV IGG SER-ACNC: 19.9 IU/ML (ref 10–?)

## 2023-11-15 PROCEDURE — 86787 VARICELLA-ZOSTER ANTIBODY: CPT | Performed by: NURSE PRACTITIONER

## 2023-11-15 PROCEDURE — 86765 RUBEOLA ANTIBODY: CPT | Performed by: NURSE PRACTITIONER

## 2023-11-15 PROCEDURE — 86735 MUMPS ANTIBODY: CPT | Performed by: NURSE PRACTITIONER

## 2023-11-15 PROCEDURE — 86762 RUBELLA ANTIBODY: CPT | Performed by: NURSE PRACTITIONER

## 2023-11-17 LAB
MEV IGG SER-ACNC: 37.7 AU/ML (ref 16.5–?)
MUV IGG SER IA-ACNC: >300 AU/ML (ref 11–?)
VZV IGG SER IA-ACNC: 2704 (ref 165–?)

## 2023-12-05 PROBLEM — K81.1 CHRONIC CHOLECYSTITIS: Status: RESOLVED | Noted: 2023-04-26 | Resolved: 2023-12-05

## 2023-12-05 PROBLEM — Z90.49 HISTORY OF CHOLECYSTECTOMY: Status: ACTIVE | Noted: 2023-12-05

## 2023-12-05 PROBLEM — K80.00 ACUTE CHOLECYSTITIS DUE TO BILIARY CALCULUS: Status: RESOLVED | Noted: 2023-04-07 | Resolved: 2023-12-05

## 2023-12-05 PROBLEM — Z86.16 HISTORY OF COVID-19: Status: RESOLVED | Noted: 2022-11-06 | Resolved: 2023-12-05

## 2023-12-06 ENCOUNTER — OFFICE VISIT (OUTPATIENT)
Dept: INTERNAL MEDICINE CLINIC | Facility: CLINIC | Age: 53
End: 2023-12-06
Payer: COMMERCIAL

## 2023-12-06 VITALS
WEIGHT: 237.19 LBS | HEART RATE: 77 BPM | DIASTOLIC BLOOD PRESSURE: 82 MMHG | BODY MASS INDEX: 44.78 KG/M2 | HEIGHT: 61 IN | RESPIRATION RATE: 18 BRPM | SYSTOLIC BLOOD PRESSURE: 138 MMHG | OXYGEN SATURATION: 95 % | TEMPERATURE: 98 F

## 2023-12-06 DIAGNOSIS — I10 PRIMARY HYPERTENSION: ICD-10-CM

## 2023-12-06 DIAGNOSIS — R73.03 PREDIABETES: ICD-10-CM

## 2023-12-06 DIAGNOSIS — E05.00 GRAVES DISEASE: ICD-10-CM

## 2023-12-06 DIAGNOSIS — Z00.00 ROUTINE GENERAL MEDICAL EXAMINATION AT A HEALTH CARE FACILITY: Primary | ICD-10-CM

## 2023-12-06 DIAGNOSIS — Z12.31 ENCOUNTER FOR SCREENING MAMMOGRAM FOR MALIGNANT NEOPLASM OF BREAST: ICD-10-CM

## 2023-12-06 DIAGNOSIS — Z90.49 HISTORY OF CHOLECYSTECTOMY: ICD-10-CM

## 2023-12-06 DIAGNOSIS — E78.5 HYPERLIPIDEMIA, UNSPECIFIED HYPERLIPIDEMIA TYPE: ICD-10-CM

## 2023-12-06 DIAGNOSIS — Z86.711 HISTORY OF PULMONARY EMBOLISM: ICD-10-CM

## 2023-12-06 DIAGNOSIS — Z90.710 S/P HYSTERECTOMY: ICD-10-CM

## 2023-12-06 DIAGNOSIS — R74.8 ELEVATED ALKALINE PHOSPHATASE LEVEL: ICD-10-CM

## 2023-12-06 DIAGNOSIS — K62.5 RECTAL BLEEDING: ICD-10-CM

## 2023-12-06 DIAGNOSIS — E05.90 HYPERTHYROIDISM: ICD-10-CM

## 2023-12-06 PROCEDURE — 3075F SYST BP GE 130 - 139MM HG: CPT | Performed by: NURSE PRACTITIONER

## 2023-12-06 PROCEDURE — 99396 PREV VISIT EST AGE 40-64: CPT | Performed by: NURSE PRACTITIONER

## 2023-12-06 PROCEDURE — 3079F DIAST BP 80-89 MM HG: CPT | Performed by: NURSE PRACTITIONER

## 2023-12-06 PROCEDURE — 3008F BODY MASS INDEX DOCD: CPT | Performed by: NURSE PRACTITIONER

## 2023-12-06 RX ORDER — METHIMAZOLE 5 MG/1
5 TABLET ORAL DAILY
COMMUNITY
Start: 2023-08-17 | End: 2024-02-13

## 2023-12-06 RX ORDER — AMLODIPINE BESYLATE 2.5 MG/1
2.5 TABLET ORAL DAILY
Qty: 90 TABLET | Refills: 0 | Status: SHIPPED | OUTPATIENT
Start: 2023-12-06

## 2023-12-06 RX ORDER — IRBESARTAN AND HYDROCHLOROTHIAZIDE 150; 12.5 MG/1; MG/1
1 TABLET, FILM COATED ORAL 2 TIMES DAILY
Qty: 180 TABLET | Refills: 2 | Status: SHIPPED | OUTPATIENT
Start: 2023-12-06

## 2023-12-17 ENCOUNTER — HOSPITAL ENCOUNTER (OUTPATIENT)
Age: 53
Discharge: HOME | End: 2023-12-17
Payer: COMMERCIAL

## 2023-12-17 VITALS
SYSTOLIC BLOOD PRESSURE: 140 MMHG | DIASTOLIC BLOOD PRESSURE: 84 MMHG | WEIGHT: 236 LBS | BODY MASS INDEX: 44.56 KG/M2 | RESPIRATION RATE: 20 BRPM | HEART RATE: 66 BPM | OXYGEN SATURATION: 99 % | HEIGHT: 61 IN | TEMPERATURE: 98 F

## 2023-12-17 DIAGNOSIS — T78.40XA ALLERGIC REACTION, INITIAL ENCOUNTER: ICD-10-CM

## 2023-12-17 DIAGNOSIS — H10.33 ACUTE CONJUNCTIVITIS OF BOTH EYES, UNSPECIFIED ACUTE CONJUNCTIVITIS TYPE: Primary | ICD-10-CM

## 2023-12-17 PROCEDURE — 99213 OFFICE O/P EST LOW 20 MIN: CPT

## 2023-12-17 RX ORDER — OFLOXACIN 3 MG/ML
1 SOLUTION/ DROPS OPHTHALMIC 4 TIMES DAILY
Qty: 1 EACH | Refills: 0 | Status: SHIPPED | OUTPATIENT
Start: 2023-12-17 | End: 2023-12-24

## 2023-12-17 NOTE — ED INITIAL ASSESSMENT (HPI)
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 Both eyes started to get red and used left over Neomycin eyeggts but is allergic to it. On Monday both eye right worse than left are  itchy, eyelid swollen with some rash.

## 2023-12-22 ENCOUNTER — TELEPHONE (OUTPATIENT)
Dept: INTERNAL MEDICINE CLINIC | Facility: CLINIC | Age: 53
End: 2023-12-22

## 2023-12-22 ENCOUNTER — PATIENT MESSAGE (OUTPATIENT)
Dept: CASE MANAGEMENT | Age: 53
End: 2023-12-22

## 2023-12-22 ENCOUNTER — TELEPHONE (OUTPATIENT)
Dept: CASE MANAGEMENT | Age: 53
End: 2023-12-22

## 2023-12-22 NOTE — TELEPHONE ENCOUNTER
Vida Eric,     Your health plan has denied the request for CT scheduled on  . You should be receiving a copy of the denial letter in the mail. Your doctor has been notified of the denial. For further discussion please contact the providers office. To reschedule/cancel your appointment, use the [employer] roby or contact Central Scheduling. Viktor: Sybil Arnold: [phone]     Have a good day! Sheree Becker Referral & Authorization Team     Status Of Case is DENIED. Attempted to reach out to patient by phone and/or [employer]. Insurance will not cover without approval.  PATIENT CANNOT PROCEED. Please have patient reach out to provider for next steps.

## 2023-12-22 NOTE — TELEPHONE ENCOUNTER
Status: DENIED        Reference number [id number]     A copy of the denial letter is filed under the MEDIA tab, reference for complete details. You may reach out to [address] [place], [place] [address]polly Ludwig  at [phone] to discuss decision. Please reach out to patient with plan of care.       Thank you

## 2023-12-22 NOTE — TELEPHONE ENCOUNTER
Received paperwork from [facility] indicating the CT Scan Abd/Pelvis with contrast has not be approved.  Paperwork on SD inbox for review.

## 2023-12-26 NOTE — TELEPHONE ENCOUNTER
CT denied.  To see GI.  If persistent or recurrent will need follow up to update documentation in order to justify new order for CT abd/pelvis.

## 2023-12-30 ENCOUNTER — HOSPITAL ENCOUNTER (OUTPATIENT)
Dept: MAMMOGRAPHY | Age: 53
Discharge: HOME | End: 2023-12-30
Attending: NURSE PRACTITIONER
Payer: COMMERCIAL

## 2023-12-30 DIAGNOSIS — Z12.31 ENCOUNTER FOR SCREENING MAMMOGRAM FOR MALIGNANT NEOPLASM OF BREAST: ICD-10-CM

## 2023-12-30 PROCEDURE — 77067 SCR MAMMO BI INCL CAD: CPT | Performed by: NURSE PRACTITIONER

## 2023-12-30 PROCEDURE — 77063 BREAST TOMOSYNTHESIS BI: CPT | Performed by: NURSE PRACTITIONER

## 2024-01-15 ENCOUNTER — HOSPITAL ENCOUNTER (OUTPATIENT)
Dept: MAMMOGRAPHY | Facility: HOSPITAL | Age: 54
Discharge: HOME | End: 2024-01-15
Attending: NURSE PRACTITIONER
Payer: COMMERCIAL

## 2024-01-15 DIAGNOSIS — R92.2 INCONCLUSIVE MAMMOGRAM: ICD-10-CM

## 2024-01-15 PROCEDURE — 76642 ULTRASOUND BREAST LIMITED: CPT | Performed by: NURSE PRACTITIONER

## 2024-01-15 PROCEDURE — 77061 BREAST TOMOSYNTHESIS UNI: CPT | Performed by: NURSE PRACTITIONER

## 2024-01-15 PROCEDURE — 77065 DX MAMMO INCL CAD UNI: CPT | Performed by: NURSE PRACTITIONER

## 2024-01-27 ENCOUNTER — HOSPITAL ENCOUNTER (OUTPATIENT)
Age: 54
Discharge: HOME | End: 2024-01-27
Payer: COMMERCIAL

## 2024-01-27 VITALS
OXYGEN SATURATION: 99 % | BODY MASS INDEX: 45 KG/M2 | TEMPERATURE: 99 F | DIASTOLIC BLOOD PRESSURE: 65 MMHG | RESPIRATION RATE: 20 BRPM | HEART RATE: 99 BPM | SYSTOLIC BLOOD PRESSURE: 127 MMHG | WEIGHT: 235.88 LBS

## 2024-01-27 DIAGNOSIS — J10.1 INFLUENZA A: Primary | ICD-10-CM

## 2024-01-27 DIAGNOSIS — H60.391 OTHER INFECTIVE ACUTE OTITIS EXTERNA OF RIGHT EAR: ICD-10-CM

## 2024-01-27 LAB
POCT INFLUENZA A: POSITIVE
POCT INFLUENZA B: NEGATIVE
SARS-COV-2 RNA RESP QL NAA+PROBE: NOT DETECTED

## 2024-01-27 PROCEDURE — 87502 INFLUENZA DNA AMP PROBE: CPT | Performed by: NURSE PRACTITIONER

## 2024-01-27 PROCEDURE — 99213 OFFICE O/P EST LOW 20 MIN: CPT

## 2024-01-27 PROCEDURE — 99214 OFFICE O/P EST MOD 30 MIN: CPT

## 2024-01-27 RX ORDER — OSELTAMIVIR PHOSPHATE 75 MG/1
75 CAPSULE ORAL 2 TIMES DAILY
Qty: 10 CAPSULE | Refills: 0 | Status: SHIPPED | OUTPATIENT
Start: 2024-01-27 | End: 2024-02-01

## 2024-01-27 RX ORDER — CIPROFLOXACIN AND DEXAMETHASONE 3; 1 MG/ML; MG/ML
4 SUSPENSION/ DROPS AURICULAR (OTIC) 2 TIMES DAILY
Qty: 7.5 ML | Refills: 0 | Status: SHIPPED | OUTPATIENT
Start: 2024-01-27 | End: 2024-02-03

## 2024-01-27 NOTE — DISCHARGE INSTRUCTIONS
Clear liquids, increased fluid intake   Fever control or pain, you may use ibuprofen, or acetaminophen as directed    Follow-up with your primary care physician for follow-up in three or 4 days if not improving    Return for respiratory distress, productive cough, vomiting, or any other changes in your condition    Rest  Tamiflu 75 milligrams, one tablet twice daily for 5 days

## 2024-01-27 NOTE — ED PROVIDER NOTES
Patient Seen in: [facility]      History     Chief Complaint   Patient presents with    Fever     Stated Complaint: fever, ear pain, cough    Subjective:   53-year-old female presents to immediate care for fever body aches cough.  Patient also reports last night started with right-sided ear pain.  She denies any hearing loss denies any drainage.            Objective:   Past Medical History:   Diagnosis Date    Graves disease 2017    High blood pressure     Morbid obesity with BMI of 45.0-49.9, adult (HCC)     PONV (postoperative nausea and vomiting)     Unspecified essential hypertension 2012    Visual impairment     contacts and glasses              Past Surgical History:   Procedure Laterality Date          x3    ENDOMETRIAL ABLATION      HERNIA SURGERY      inguinal hernai repair    HYSTERECTOMY      HYSTEROSCOPY,WITH ENDOMETRIAL  10/01/2006    OOPHORECTOMY      REMOVAL GALLBLADDER      TOTAL ABDOMINAL HYSTERECTOMY      TUBAL LIGATION  2004                Social History     Socioeconomic History    Marital status:    Tobacco Use    Smoking status: Never    Smokeless tobacco: Never   Vaping Use    Vaping Use: Never used   Substance and Sexual Activity    Alcohol use: Not Currently     Alcohol/week: 1.0 standard drink of alcohol     Types: 1 Standard drinks or equivalent per week    Drug use: No    Sexual activity: Yes   Other Topics Concern    Exercise No              Review of Systems   Constitutional: Negative.    Respiratory: Negative.     Cardiovascular: Negative.    Gastrointestinal: Negative.    Skin: Negative.    Neurological: Negative.        Positive for stated complaint: fever, ear pain, cough  Other systems are as noted in HPI.  Constitutional and vital signs reviewed.      All other systems reviewed and negative except as noted above.    Physical Exam     ED Triage Vitals [24 1002]   /65   Pulse 99   Resp 20   Temp 98.9 °F (37.2 °C)   Temp  src Temporal   SpO2 99 %   O2 Device None (Room air)       Current:/65   Pulse 99   Temp 98.9 °F (37.2 °C) (Temporal)   Resp 20   Wt 107 kg   LMP 01/23/2017 (LMP Unknown)   SpO2 99%   BMI 44.57 kg/m²         Physical Exam  Vitals and nursing note reviewed.   Constitutional:       General: She is not in acute distress.  HENT:      Head: Normocephalic.      Left Ear: Swelling present.   Cardiovascular:      Rate and Rhythm: Normal rate and regular rhythm.      Heart sounds: Normal heart sounds.   Pulmonary:      Effort: Pulmonary effort is normal.      Breath sounds: Normal breath sounds.   Musculoskeletal:         General: Normal range of motion.   Skin:     General: Skin is warm and dry.   Neurological:      General: No focal deficit present.      Mental Status: She is alert and oriented to person, place, and time.               ED Course     Labs Reviewed   POCT FLU TEST - Abnormal; Notable for the following components:       Result Value    POCT INFLUENZA A Positive (*)     All other components within normal limits    Narrative:     This assay is a rapid molecular in vitro test utilizing nucleic acid amplification of influenza A and B viral RNA.   RAPID SARS-COV-2 BY PCR - Normal                      MDM      Medical Decision Making  Pertinent Labs & Imaging studies reviewed. (See chart for details).  Patient coming in with fever body aches cough and ear pain.   Differential diagnosis includes otitis media, influenza, viral illness  Labs reviewed influenza A is positive.  Will treat for influenza A, otitis externa.  Will discharge on Ciprodex. Patient is comfortable with this plan.     Overall Pt looks good. Non-toxic, well-hydrated and in no respiratory distress. Vital signs are reassuring. Exam is reassuring. I do not believe pt requires and additional diagnostic studies or intervention. I believe pt can be discharged home to continue evaluation as an outpatient. Follow-up provider given. Discharge  instructions given and reviewed. Return for any problems. All understand and agreewith the plan.     Please note that this report has been produced using speech recognition software and may contain errors related to that system including, but not limited to, errors in grammar, punctuation, and spelling, as well as words and phrases that possibly may have been recognized inappropriately. If there are any questions or concerns, contact the dictating provider for clarification.       Problems Addressed:  Influenza A: acute illness or injury  Other infective acute otitis externa of right ear: acute illness or injury        Disposition and Plan     Clinical Impression:  1. Influenza A    2. Other infective acute otitis externa of right ear         Disposition:  Discharge  1/27/2024 10:47 am    Follow-up:  Yury Callaway MD  [address] [place] [address]  [phone]                Medications Prescribed:  Discharge Medication List as of 1/27/2024 10:48 AM        START taking these medications    Details   ciprofloxacin-dexamethasone 0.3-0.1 % Otic Suspension Place 4 drops into the right ear 2 (two) times daily for 7 days., Normal, Disp-7.5 mL, R-0      oseltamivir (TAMIFLU) 75 MG Oral Cap Take 1 capsule (75 mg total) by mouth 2 (two) times daily for 5 days., Normal, Disp-10 capsule, R-0

## 2024-02-10 ENCOUNTER — PATIENT MESSAGE (OUTPATIENT)
Dept: INTERNAL MEDICINE CLINIC | Facility: CLINIC | Age: 54
End: 2024-02-10

## 2024-02-12 NOTE — TELEPHONE ENCOUNTER
From: Shannon Mann  To: Fanta Flores  Sent: 2/10/2024 1:41 PM CST  Subject: Sinus infection    Juan Ferrer, I'm pretty sure I have a sinus infection. Is there any way that you can prescribe medicine for it? Or should I go to an urgent care?

## 2024-02-29 ENCOUNTER — HOSPITAL ENCOUNTER (OUTPATIENT)
Dept: GENERAL RADIOLOGY | Facility: HOSPITAL | Age: 54
Discharge: HOME | End: 2024-02-29
Attending: PHYSICIAN ASSISTANT
Payer: COMMERCIAL

## 2024-02-29 ENCOUNTER — OFFICE VISIT (OUTPATIENT)
Dept: INTERNAL MEDICINE CLINIC | Facility: CLINIC | Age: 54
End: 2024-02-29
Payer: COMMERCIAL

## 2024-02-29 VITALS
WEIGHT: 251 LBS | BODY MASS INDEX: 47.39 KG/M2 | SYSTOLIC BLOOD PRESSURE: 147 MMHG | RESPIRATION RATE: 18 BRPM | DIASTOLIC BLOOD PRESSURE: 89 MMHG | OXYGEN SATURATION: 97 % | HEIGHT: 61 IN | HEART RATE: 81 BPM | TEMPERATURE: 98 F

## 2024-02-29 DIAGNOSIS — I10 PRIMARY HYPERTENSION: ICD-10-CM

## 2024-02-29 DIAGNOSIS — M25.572 ACUTE LEFT ANKLE PAIN: ICD-10-CM

## 2024-02-29 DIAGNOSIS — R05.2 SUBACUTE COUGH: ICD-10-CM

## 2024-02-29 DIAGNOSIS — M25.572 ACUTE LEFT ANKLE PAIN: Primary | ICD-10-CM

## 2024-02-29 PROCEDURE — 3008F BODY MASS INDEX DOCD: CPT | Performed by: PHYSICIAN ASSISTANT

## 2024-02-29 PROCEDURE — 73610 X-RAY EXAM OF ANKLE: CPT | Performed by: PHYSICIAN ASSISTANT

## 2024-02-29 PROCEDURE — 3079F DIAST BP 80-89 MM HG: CPT | Performed by: PHYSICIAN ASSISTANT

## 2024-02-29 PROCEDURE — 3077F SYST BP >= 140 MM HG: CPT | Performed by: PHYSICIAN ASSISTANT

## 2024-02-29 PROCEDURE — 99214 OFFICE O/P EST MOD 30 MIN: CPT | Performed by: PHYSICIAN ASSISTANT

## 2024-02-29 NOTE — PROGRESS NOTES
Chief Complaint   Patient presents with    Urgent Care F/u     Follow up from urgent care     Cough     C/o cough     Ear Pain     C/o right ear pain denies discharge     Ankle Injury     C/o left ankle pain/ injury patient fell down stairs x 1 month ago        HPI:  Patient presents for follow-up of urgent care visits with complaint of recurrence of cough and with complaint of left ankle pain since a fall she sustained a month ago.  Patient was seen in urgent care on 1/27/2024 and was diagnosed with bilateral otitis externa, pinkeye, influenza.  She was treated with abx eye drops (she had at home) abx ear drops and Tamiflu.  Pt had worsening ear sxs and returned to  on 2/12/24.  She was then diagnosed with OM, \"sinusitis,\" bronchitis.  She was given an inhaler that she did not fill (due to \"jittery\" SEs), Augmentin.  Pt finished the course of Augmentin and reports that her ear pain is better and her cough had resolved until a couple of days ago when it started to return.  She admits to post nasal drainage.  No f/c.  She does not feel she is wheezing.  She denies CP and SOB.  She denies sinus pressure.  Pt did have + D-dimer in  and had a CT chest that ruled out PE and PNA.    L ankle pain since a fall a month ago.  Twisted her ankle.  Has not had evaluated.  Pain worsened after doing an exercise class.  Now painful when she walks, also swollen.  Pt has not used compression or elevation up to this point.  There is also some tenderness to the touch over the inside of her ankle.  Pain shoots up her shin and down the top of her foot at times.      HTN - Pt is not taking Labetolol due to SEs.  Had lost significant weight and so only taking her Irbesartan hydrochlorothiazide once a day instead of twice.      Review of Systems   See HPI.  No other complaints today.    Past Medical History:   Diagnosis Date    Graves disease 2017    High blood pressure     Morbid obesity with BMI of 45.0-49.9, adult (HCC)     PONV  (postoperative nausea and vomiting)     Unspecified essential hypertension 6/29/2012    Visual impairment     contacts and glasses       Patient Active Problem List   Diagnosis    Primary hypertension    S/P hysterectomy    Morbid obesity (HCC)    Hyperthyroidism    Graves disease    Prediabetes    Urinary incontinence    Primary osteoarthritis of left knee    Hyperlipidemia    History of pulmonary embolism    History of cholecystectomy       Current Outpatient Medications   Medication Sig Dispense Refill    Irbesartan-hydroCHLOROthiazide 150-12.5 MG Oral Tab Take 1 tablet by mouth in the morning and 1 tablet before bedtime. 180 tablet 2       Physical Exam  /89   Pulse 81   Temp 97.6 °F (36.4 °C)   Resp 18   Ht 5' 1\" (1.549 m)   Wt 251 lb (113.9 kg)   LMP 01/23/2017 (LMP Unknown)   SpO2 97%   BMI 47.43 kg/m²   Constitutional:  No distress.   HEENT:  Normocephalic and atraumatic. Tympanic membranes normal.  Nose normal. Oropharynx is moist with mild global erythema..   Eyes: Conjunctivae are normal. PERRLA.  Neck: Neck supple.   Cardiovascular: Normal rate, regular rhythm.  No murmur, rubs or gallops.   Pulmonary/Chest: Effort normal and breath sounds normal. No respiratory distress. No wheezes, rhonchi or rales  MS:  L ankle:  + swelling, no gross deformity.  + TTP around med malleolus and superiorly.  No pain with eversion but some with incersion.  Negative drawer.  Skin: Skin is warm and dry. No rash noted. No erythema. No pallor.       A/P:    Encounter Diagnoses   Name Primary?    Acute left ankle pain - check X-ray today to r/o fracture.  ACE wrap for compression provided.  If no fracture then likely sprain.  Encouraged R.I.C.E and ROM exercises.  Notify me if not improving and will refer to Ortho.  Pt agreeable to plan. Yes    Subacute cough - UC notes reviewed.  Sxs had improved significantly but then cough has worsened again.  ?PND  Will try Flonase NS 2 sprays per nostril daily (pt has at  home).  F/U if not resolving.       Primary hypertension - did not improve on repeat.  Increase Iresartan hydrochlorothiazide back to BID.  F/U in 4 weeks.        Code selection for this visit was based on time spent (38 min) on date of service in preparing to see the patient, obtaining and/or reviewing separately obtained history, performing a medically appropriate examination, counseling and educating the patient/family/caregiver, ordering medications or testing, referring and communicating with other healthcare providers, documenting clinical information in the EHR, independently interpreting results and communicating results to the patient/family/caregiver and care coordination with the patient's other providers.      No orders of the defined types were placed in this encounter.      Meds & Refills for this Visit:  Requested Prescriptions      No prescriptions requested or ordered in this encounter       Imaging & Consults:  None    Return in about 4 weeks (around 3/28/2024) for BP.  There are no Patient Instructions on file for this visit.    All questions were answered and the patient understands the plan.

## 2024-03-06 ENCOUNTER — HOSPITAL ENCOUNTER (OUTPATIENT)
Age: 54
Discharge: HOME | End: 2024-03-06
Payer: COMMERCIAL

## 2024-03-06 VITALS
DIASTOLIC BLOOD PRESSURE: 93 MMHG | TEMPERATURE: 99 F | SYSTOLIC BLOOD PRESSURE: 144 MMHG | HEART RATE: 79 BPM | OXYGEN SATURATION: 96 % | BODY MASS INDEX: 45.31 KG/M2 | WEIGHT: 240 LBS | RESPIRATION RATE: 20 BRPM | HEIGHT: 61 IN

## 2024-03-06 DIAGNOSIS — R09.82 PND (POST-NASAL DRIP): ICD-10-CM

## 2024-03-06 DIAGNOSIS — J02.9 THROAT SORENESS: Primary | ICD-10-CM

## 2024-03-06 LAB — S PYO AG THROAT QL IA.RAPID: NEGATIVE

## 2024-03-06 PROCEDURE — 99214 OFFICE O/P EST MOD 30 MIN: CPT

## 2024-03-06 PROCEDURE — 99213 OFFICE O/P EST LOW 20 MIN: CPT

## 2024-03-06 PROCEDURE — 87651 STREP A DNA AMP PROBE: CPT | Performed by: PHYSICIAN ASSISTANT

## 2024-03-06 RX ORDER — DEXAMETHASONE 4 MG/1
4 TABLET ORAL ONCE
Status: COMPLETED | OUTPATIENT
Start: 2024-03-06 | End: 2024-03-06

## 2024-03-07 NOTE — ED PROVIDER NOTES
Patient Seen in: [facility]      History     Chief Complaint   Patient presents with    Sore Throat    Ear Problem Pain     Stated Complaint: sore throat, ear pain    Subjective:   HPI    54-year-old female here with complaint of throat pain as well as postnasal drip.  Patient states that she has been on and off ill since January.  Patient was treated for double ear infection in the middle of February.  Patient denies chest pain, shortness of breath, cough, abdominal pain, nausea, vomiting or diarrhea.  Patient is tolerating p.o. speaking full sentences.  Afebrile.    Objective:   Past Medical History:   Diagnosis Date    Graves disease 2017    High blood pressure     Morbid obesity with BMI of 45.0-49.9, adult (HCC)     PONV (postoperative nausea and vomiting)     Unspecified essential hypertension 2012    Visual impairment     contacts and glasses              Past Surgical History:   Procedure Laterality Date          x3    ENDOMETRIAL ABLATION      HERNIA SURGERY      inguinal hernai repair    HYSTERECTOMY      HYSTEROSCOPY,WITH ENDOMETRIAL  10/01/2006    OOPHORECTOMY      REMOVAL GALLBLADDER      TOTAL ABDOMINAL HYSTERECTOMY      TUBAL LIGATION  2004                The patient's medication list, past medical history and social history elements  as listed in today's nurse's notes are reviewed and agree.   The patient's family history is reviewed and is noncontributory to the presenting problem, except as indicated as above.   Social History     Socioeconomic History    Marital status:    Tobacco Use    Smoking status: Never    Smokeless tobacco: Never   Vaping Use    Vaping Use: Never used   Substance and Sexual Activity    Alcohol use: Not Currently     Alcohol/week: 1.0 standard drink of alcohol     Types: 1 Standard drinks or equivalent per week    Drug use: No    Sexual activity: Yes   Other Topics Concern    Exercise No              Review of  Systems    Positive for stated complaint: sore throat, ear pain  Other systems are as noted in HPI.  Constitutional and vital signs reviewed.      All other systems reviewed and negative except as noted above.    Physical Exam     ED Triage Vitals [03/06/24 1834]   BP (!) 144/93   Pulse 79   Resp 20   Temp 98.7 °F (37.1 °C)   Temp src Temporal   SpO2 96 %   O2 Device None (Room air)       Current:BP (!) 144/93   Pulse 79   Temp 98.7 °F (37.1 °C) (Temporal)   Resp 20   Ht 154.9 cm (5' 1\")   Wt 108.9 kg   LMP 01/23/2017 (LMP Unknown)   SpO2 96%   BMI 45.35 kg/m²         Physical Exam  Vitals and nursing note reviewed.   Constitutional:       Appearance: She is well-developed.   HENT:      Head: Normocephalic.      Jaw: There is normal jaw occlusion.      Right Ear: External ear normal.      Left Ear: External ear normal.      Nose: Rhinorrhea present. Rhinorrhea is clear.      Comments: Uvula midline: No trismus or drooling: No peritonsillar abscess noted moderate cobblestoning the posterior pharynx.     Mouth/Throat:      Lips: Pink.      Mouth: Mucous membranes are moist.      Pharynx: Pharyngeal swelling and posterior oropharyngeal erythema present.   Eyes:      Conjunctiva/sclera: Conjunctivae normal.      Pupils: Pupils are equal, round, and reactive to light.   Cardiovascular:      Rate and Rhythm: Normal rate and regular rhythm.      Heart sounds: Normal heart sounds.   Pulmonary:      Effort: Pulmonary effort is normal.      Breath sounds: Normal breath sounds.   Musculoskeletal:      Cervical back: Normal range of motion and neck supple.   Skin:     General: Skin is warm.      Capillary Refill: Capillary refill takes less than 2 seconds.   Neurological:      General: No focal deficit present.      Mental Status: She is alert and oriented to person, place, and time.   Psychiatric:         Mood and Affect: Mood normal.         Behavior: Behavior normal.         Thought Content: Thought content normal.          Judgment: Judgment normal.             ED Course     Labs Reviewed   RAPID STREP A - Normal                      MDM           Clinical Impression: throat soreness/PND  Course of Treatment:   The decadron will work in your system the next several days.  Push fluids.  Recommend taking an over the counter antihistamine daily: IE zyrtec/claritin.  Sleep more upright. Use chloraseptic spray to help stop the cough trigger reflex.  Push fluids and gargle with warm saline rinses.   Make a follow-up appointment with your primary care physician for further evaluation and treatment.      The patient is encouraged to return if any concerning symptoms arise. Additional verbal discharge instructions are given and discussed. Discharge medications are discussed. The patient is in good condition throughout the visit today and remains so upon discharge. I discuss the plan of care with the patient, who expresses understanding. All questions and concerns are addressed to the patient's satisfaction prior to discharge today.  Previous conversations with PCP and charts were reviewed.                                   Disposition and Plan     Clinical Impression:  1. Throat soreness    2. PND (post-nasal drip)         Disposition:  Discharge  3/6/2024  7:00 pm    Follow-up:  Yury Callaway MD  [address] [place] [id number]  [phone]                Medications Prescribed:  Current Discharge Medication List

## 2024-03-07 NOTE — DISCHARGE INSTRUCTIONS
Please return to the ER/clinic if symptoms worsen. Follow-up with your PCP in 24-48 hours as needed.    The decadron will work in your system the next several days.  Push fluids.  Recommend taking an over the counter antihistamine daily: IE zyrtec/claritin.  Sleep more upright. Use chloraseptic spray to help stop the cough trigger reflex.  Push fluids and gargle with warm saline rinses.   Make a follow-up appointment with your primary care physician for further evaluation and treatment.

## 2024-06-25 ENCOUNTER — LAB ENCOUNTER (OUTPATIENT)
Dept: LAB | Age: 54
End: 2024-06-25
Attending: NURSE PRACTITIONER

## 2024-06-25 ENCOUNTER — HOSPITAL ENCOUNTER (EMERGENCY)
Facility: HOSPITAL | Age: 54
Discharge: HOME | End: 2024-06-25
Attending: EMERGENCY MEDICINE

## 2024-06-25 ENCOUNTER — OFFICE VISIT (OUTPATIENT)
Dept: INTERNAL MEDICINE CLINIC | Facility: CLINIC | Age: 54
End: 2024-06-25

## 2024-06-25 ENCOUNTER — APPOINTMENT (OUTPATIENT)
Dept: CT IMAGING | Facility: HOSPITAL | Age: 54
End: 2024-06-25
Attending: EMERGENCY MEDICINE

## 2024-06-25 VITALS
OXYGEN SATURATION: 96 % | BODY MASS INDEX: 49.28 KG/M2 | WEIGHT: 261 LBS | HEART RATE: 75 BPM | RESPIRATION RATE: 18 BRPM | TEMPERATURE: 97 F | SYSTOLIC BLOOD PRESSURE: 134 MMHG | HEIGHT: 61 IN | DIASTOLIC BLOOD PRESSURE: 80 MMHG

## 2024-06-25 VITALS
RESPIRATION RATE: 14 BRPM | TEMPERATURE: 97 F | WEIGHT: 260.56 LBS | BODY MASS INDEX: 49 KG/M2 | DIASTOLIC BLOOD PRESSURE: 84 MMHG | HEART RATE: 69 BPM | SYSTOLIC BLOOD PRESSURE: 155 MMHG | OXYGEN SATURATION: 100 %

## 2024-06-25 DIAGNOSIS — I10 PRIMARY HYPERTENSION: ICD-10-CM

## 2024-06-25 DIAGNOSIS — E04.9 ENLARGED THYROID: ICD-10-CM

## 2024-06-25 DIAGNOSIS — Z86.711 HISTORY OF PULMONARY EMBOLISM: ICD-10-CM

## 2024-06-25 DIAGNOSIS — E05.00 GRAVES DISEASE: ICD-10-CM

## 2024-06-25 DIAGNOSIS — R05.3 CHRONIC COUGH: ICD-10-CM

## 2024-06-25 DIAGNOSIS — N63.20 MASS OF LEFT BREAST, UNSPECIFIED QUADRANT: ICD-10-CM

## 2024-06-25 DIAGNOSIS — R05.3 CHRONIC COUGH: Primary | ICD-10-CM

## 2024-06-25 DIAGNOSIS — E05.90 HYPERTHYROIDISM: ICD-10-CM

## 2024-06-25 LAB
ALBUMIN SERPL-MCNC: 3.4 G/DL (ref 3.4–5)
ALBUMIN SERPL-MCNC: 3.5 G/DL (ref 3.4–5)
ALBUMIN/GLOB SERPL: 0.8 {RATIO} (ref 1–2)
ALBUMIN/GLOB SERPL: 0.9 {RATIO} (ref 1–2)
ALP LIVER SERPL-CCNC: 111 U/L
ALP LIVER SERPL-CCNC: 115 U/L
ALT SERPL-CCNC: 10 U/L
ALT SERPL-CCNC: 8 U/L
ANION GAP SERPL CALC-SCNC: 4 MMOL/L (ref 0–18)
ANION GAP SERPL CALC-SCNC: 4 MMOL/L (ref 0–18)
APTT PPP: 32.4 SECONDS (ref 23–36)
AST SERPL-CCNC: 17 U/L (ref 15–37)
AST SERPL-CCNC: 18 U/L (ref 15–37)
BASOPHILS # BLD AUTO: 0.02 X10(3) UL (ref 0–0.2)
BASOPHILS NFR BLD AUTO: 0.3 %
BILIRUB SERPL-MCNC: 0.5 MG/DL (ref 0.1–2)
BILIRUB SERPL-MCNC: 0.5 MG/DL (ref 0.1–2)
BUN BLD-MCNC: 18 MG/DL (ref 9–23)
BUN BLD-MCNC: 19 MG/DL (ref 9–23)
CALCIUM BLD-MCNC: 9.1 MG/DL (ref 8.5–10.1)
CALCIUM BLD-MCNC: 9.3 MG/DL (ref 8.5–10.1)
CHLORIDE SERPL-SCNC: 103 MMOL/L (ref 98–112)
CHLORIDE SERPL-SCNC: 104 MMOL/L (ref 98–112)
CO2 SERPL-SCNC: 31 MMOL/L (ref 21–32)
CO2 SERPL-SCNC: 32 MMOL/L (ref 21–32)
CREAT BLD-MCNC: 0.74 MG/DL
CREAT BLD-MCNC: 0.74 MG/DL
D DIMER PPP FEU-MCNC: 0.61 UG/ML FEU (ref ?–0.52)
EGFRCR SERPLBLD CKD-EPI 2021: 97 ML/MIN/1.73M2 (ref 60–?)
EGFRCR SERPLBLD CKD-EPI 2021: 97 ML/MIN/1.73M2 (ref 60–?)
EOSINOPHIL # BLD AUTO: 0.22 X10(3) UL (ref 0–0.7)
EOSINOPHIL NFR BLD AUTO: 3.6 %
ERYTHROCYTE [DISTWIDTH] IN BLOOD BY AUTOMATED COUNT: 12.2 %
FASTING STATUS PATIENT QL REPORTED: NO
GLOBULIN PLAS-MCNC: 4.1 G/DL (ref 2.8–4.4)
GLOBULIN PLAS-MCNC: 4.1 G/DL (ref 2.8–4.4)
GLUCOSE BLD-MCNC: 91 MG/DL (ref 70–99)
GLUCOSE BLD-MCNC: 97 MG/DL (ref 70–99)
HCT VFR BLD AUTO: 37 %
HGB BLD-MCNC: 13.1 G/DL
IMM GRANULOCYTES # BLD AUTO: 0.03 X10(3) UL (ref 0–1)
IMM GRANULOCYTES NFR BLD: 0.5 %
INR BLD: 0.92 (ref 0.8–1.2)
LYMPHOCYTES # BLD AUTO: 2.65 X10(3) UL (ref 1–4)
LYMPHOCYTES NFR BLD AUTO: 43.4 %
MCH RBC QN AUTO: 29.7 PG (ref 26–34)
MCHC RBC AUTO-ENTMCNC: 35.4 G/DL (ref 31–37)
MCV RBC AUTO: 83.9 FL
MONOCYTES # BLD AUTO: 0.47 X10(3) UL (ref 0.1–1)
MONOCYTES NFR BLD AUTO: 7.7 %
NEUTROPHILS # BLD AUTO: 2.71 X10 (3) UL (ref 1.5–7.7)
NEUTROPHILS # BLD AUTO: 2.71 X10(3) UL (ref 1.5–7.7)
NEUTROPHILS NFR BLD AUTO: 44.5 %
OSMOLALITY SERPL CALC.SUM OF ELEC: 289 MOSM/KG (ref 275–295)
OSMOLALITY SERPL CALC.SUM OF ELEC: 290 MOSM/KG (ref 275–295)
PLATELET # BLD AUTO: 269 10(3)UL (ref 150–450)
POTASSIUM SERPL-SCNC: 3.4 MMOL/L (ref 3.5–5.1)
POTASSIUM SERPL-SCNC: 3.7 MMOL/L (ref 3.5–5.1)
PROT SERPL-MCNC: 7.5 G/DL (ref 6.4–8.2)
PROT SERPL-MCNC: 7.6 G/DL (ref 6.4–8.2)
PROTHROMBIN TIME: 12.4 SECONDS (ref 11.6–14.8)
RBC # BLD AUTO: 4.41 X10(6)UL
SODIUM SERPL-SCNC: 139 MMOL/L (ref 136–145)
SODIUM SERPL-SCNC: 139 MMOL/L (ref 136–145)
TROPONIN I SERPL HS-MCNC: 12 NG/L
WBC # BLD AUTO: 6.1 X10(3) UL (ref 4–11)

## 2024-06-25 PROCEDURE — 99285 EMERGENCY DEPT VISIT HI MDM: CPT

## 2024-06-25 PROCEDURE — G2211 COMPLEX E/M VISIT ADD ON: HCPCS | Performed by: NURSE PRACTITIONER

## 2024-06-25 PROCEDURE — 85025 COMPLETE CBC W/AUTO DIFF WBC: CPT | Performed by: EMERGENCY MEDICINE

## 2024-06-25 PROCEDURE — 85379 FIBRIN DEGRADATION QUANT: CPT | Performed by: NURSE PRACTITIONER

## 2024-06-25 PROCEDURE — 3079F DIAST BP 80-89 MM HG: CPT | Performed by: NURSE PRACTITIONER

## 2024-06-25 PROCEDURE — 80053 COMPREHEN METABOLIC PANEL: CPT | Performed by: EMERGENCY MEDICINE

## 2024-06-25 PROCEDURE — 93005 ELECTROCARDIOGRAM TRACING: CPT

## 2024-06-25 PROCEDURE — 99214 OFFICE O/P EST MOD 30 MIN: CPT | Performed by: NURSE PRACTITIONER

## 2024-06-25 PROCEDURE — 36415 COLL VENOUS BLD VENIPUNCTURE: CPT

## 2024-06-25 PROCEDURE — 80053 COMPREHEN METABOLIC PANEL: CPT | Performed by: NURSE PRACTITIONER

## 2024-06-25 PROCEDURE — 3008F BODY MASS INDEX DOCD: CPT | Performed by: NURSE PRACTITIONER

## 2024-06-25 PROCEDURE — 84484 ASSAY OF TROPONIN QUANT: CPT | Performed by: EMERGENCY MEDICINE

## 2024-06-25 PROCEDURE — 3075F SYST BP GE 130 - 139MM HG: CPT | Performed by: NURSE PRACTITIONER

## 2024-06-25 PROCEDURE — 85610 PROTHROMBIN TIME: CPT | Performed by: EMERGENCY MEDICINE

## 2024-06-25 PROCEDURE — 93010 ELECTROCARDIOGRAM REPORT: CPT

## 2024-06-25 PROCEDURE — 85730 THROMBOPLASTIN TIME PARTIAL: CPT | Performed by: EMERGENCY MEDICINE

## 2024-06-25 PROCEDURE — 71275 CT ANGIOGRAPHY CHEST: CPT | Performed by: EMERGENCY MEDICINE

## 2024-06-25 RX ORDER — IRBESARTAN AND HYDROCHLOROTHIAZIDE 150; 12.5 MG/1; MG/1
1 TABLET, FILM COATED ORAL 2 TIMES DAILY
Qty: 180 TABLET | Refills: 2 | Status: SHIPPED | OUTPATIENT
Start: 2024-06-25 | End: 2024-06-27

## 2024-06-25 RX ORDER — METHIMAZOLE 5 MG/1
5 TABLET ORAL 3 TIMES DAILY
COMMUNITY
Start: 2024-01-09

## 2024-06-25 NOTE — ED INITIAL ASSESSMENT (HPI)
Pt to ER following abnormal labs results taken in an [facility] office. Pt states she was seeing Carissa Flores NP. Pt states she had a dimer of 0.61 and hx of PE. Pt denies SOB and is currently not symptomatic.

## 2024-06-25 NOTE — PROGRESS NOTES
Shannon Mann is a 54 year old female.    Chief Complaint   Patient presents with    Cough     EJ Rm 10- Pt is here for a cough she had off and on for the past 2 years       HPI:   here for eval of cough.  Intermittent.  She feels ever since she had covid and PE in 2022 she has had a chronic cough.  Comes and goes.  Most recently has worsened.    She states not feeling ill  No SOB at rest or with exertion.  She was in UC in March for ear pain.  Discussed cough and thought allergy / seasonal related  she has been taking zyrtec and nasal spray with no improvement of cough.   She does not feel allergy congested.   When cough is present, deep harsh.  6-7 times per day.  Worse at night when lying down.      She states historically she was on labetolol at which made cough worse  she stopped the medication and cough improved. (Chart review ? 2017)  has done better on irbesartan hct    Hx PE 10/22.  Thought to be triggered by covid and travel.  Had seen Dr Westfall.  3 mo Eliquis and stopped in 2/23.  D dimer at that time was negative.  She continues on ASA for secondary prevention.      Left breast mass.  New finding 4 days ago while doing self breast exam note abnormality left outer quadrant.   Last mammogram December 2023      HTN  stable  irbesartan HCT now taking bid    bp stable at home.   Increased stress.      Hyperthyroid   Dr Belle.    Transitioning to [place] and has appt next mo.  She states she discussed chronic cough with Dr Belle in the past and thought was maybe goiter causing bronchospasm to to pressure on the trachea.  Will check US   Needs refills until ov.  Methimazole 5mg daily.    Patient Active Problem List   Diagnosis    Primary hypertension    S/P hysterectomy    Morbid obesity (HCC)    Hyperthyroidism    Graves disease    Prediabetes    Urinary incontinence    Primary osteoarthritis of left knee    Hyperlipidemia    History of pulmonary embolism    History of cholecystectomy     Current Outpatient  Medications   Medication Sig Dispense Refill    methIMAzole 5 MG Oral Tab Take 1 tablet (5 mg total) by mouth 3 (three) times daily.      Irbesartan-hydroCHLOROthiazide 150-12.5 MG Oral Tab Take 1 tablet by mouth in the morning and 1 tablet before bedtime. 180 tablet 2      Past Medical History:    Graves disease    High blood pressure    Morbid obesity with BMI of 45.0-49.9, adult (HCC)    PONV (postoperative nausea and vomiting)    Unspecified essential hypertension    Visual impairment    contacts and glasses      Social History:  Social History     Socioeconomic History    Marital status:    Tobacco Use    Smoking status: Never    Smokeless tobacco: Never   Vaping Use    Vaping status: Never Used   Substance and Sexual Activity    Alcohol use: Not Currently     Alcohol/week: 1.0 standard drink of alcohol     Types: 1 Standard drinks or equivalent per week    Drug use: No    Sexual activity: Yes   Other Topics Concern    Exercise No     Social Determinants of Health      Received from .com, .com    [facility]     Family History   Problem Relation Age of Onset    Other (CAD) Father     Other (HTN) Maternal Grandfather     Other (HTN) Mother         Allergies  Allergies   Allergen Reactions    Neosporin Af [Miconazole Nitrate] RASH     CRREA-blisters    Sulfa Antibiotics OTHER (SEE COMMENTS)    Neomycin RASH    Sulfur-Salicylic Acid [Salicylic Acid-Sulfur] OTHER (SEE COMMENTS)     Cause the skin to bubble.       REVIEW OF SYSTEMS:   GENERAL HEALTH: feels well otherwise  SKIN: denies any unusual skin lesions or rashes  RESPIRATORY: denies shortness of breath with exertion, no cough  CARDIOVASCULAR: denies chest pain on exertion, no palpatations  GI: denies abdominal pain and denies heartburn, no diarrhea or constipation  MUSCULOSKELETAL:  No arthralgias or myalgias  NEURO: denies headaches,     EXAM:   /80   Pulse 75   Temp 96.6 °F (35.9 °C) (Temporal)   Resp 18   Ht 5' 1\" (1.549 m)    Wt 261 lb (118.4 kg)   LMP 01/23/2017 (LMP Unknown)   SpO2 96%   BMI 49.32 kg/m²   GENERAL: well developed, well nourished,in no apparent distress  HEENT: atraumatic, normocephalic,ears and throat are clear  NECK: supple,enlarged thyroid  LUNGS: normal rate without respiratory distress, lungs clear to auscultation  BREAST  left breast with palpable mobile pea size mass 3:00 position laterally.    CARDIO: RRR without murmur  GI: normal bowel sounds, no masses, HSM or tenderness  EXTREMITIES: no edema, normal strength and tone  PSYCH: alert and oriented x 3    ASSESSMENT AND PLAN:     Encounter Diagnoses   Name Primary?    Chronic cough  stat d dimer  further plan to follow results.  She is aware if positive will need ER eval and CTA chest.   Yes    Mass of left breast, unspecified quadrant  diagnostic mammogram with US if indicated.      Hyperthyroidism  has upcoming appt with Endo  check US prior to ov.      Graves disease     Enlarged thyroid     Primary hypertension  stable  cont irbesartan hct.      History of pulmonary embolism        Orders Placed This Encounter   Procedures    Comp Metabolic Panel (14) [E]    D-Dimer [E]       Meds & Refills for this Visit:  Requested Prescriptions     Signed Prescriptions Disp Refills    Irbesartan-hydroCHLOROthiazide 150-12.5 MG Oral Tab 180 tablet 2     Sig: Take 1 tablet by mouth in the morning and 1 tablet before bedtime.       Imaging & Consults:  PULMONARY - INTERNAL  US THYROID (CPT=76536)  [facility] KIM 2D+3D DIAGNOSTIC [facility] LEFT (CPT=77065/42117)    No follow-ups on file.  There are no Patient Instructions on file for this visit.

## 2024-06-25 NOTE — ED PROVIDER NOTES
Patient Seen in: [facility] Emergency Department      History     Chief Complaint   Patient presents with    Abnormal Labs     Stated Complaint: elevated ddimer 0.61, hx of blood clots, SOB    Subjective:   HPI    54-year-old female with a past medical history as below including hypertension, Graves' disease, PE no longer on Eliquis presents from her doctor's office for PE workup due to elevated D-dimer.  Patient states she went to see her doctor due to chronic cough that has been ongoing since she had COVID in .  She states it is usually dry but occasionally productive.  She denies any associated chest pain or shortness of breath.  States she did have a blood clot for PE workup due to elevated D-dimer.  Patient states she went after having flulike symptoms in  and was treated with Eliquis for 3 months.  She states she was not having any chest pain or shortness of breath at that time either.  Denies leg swelling or calf pain.  Denies feeling dizzy or lightheaded.  Denies fever or chills.    Objective:   Past Medical History:    Graves disease    High blood pressure    Morbid obesity with BMI of 45.0-49.9, adult (HCC)    PONV (postoperative nausea and vomiting)    Pulmonary embolism (HCC)    Unspecified essential hypertension    Visual impairment    contacts and glasses              Past Surgical History:   Procedure Laterality Date          x3    Endometrial ablation      Hernia surgery      inguinal hernai repair    Hysterectomy      Hysteroscopy,with endometrial  10/01/2006    Oophorectomy      Removal gallbladder      Total abdominal hysterectomy      Tubal ligation  2004                No pertinent social history.            Review of Systems    Positive for stated Chief Complaint: Abnormal Labs    Other systems are as noted in HPI.  Constitutional and vital signs reviewed.      All other systems reviewed and negative except as noted above.    Physical Exam     ED Triage Vitals  [06/25/24 1322]   BP (!) 145/94   Pulse 76   Resp 18   Temp 97 °F (36.1 °C)   Temp src Temporal   SpO2 98 %   O2 Device None (Room air)       Current Vitals:   Vital Signs  BP: 155/84  Pulse: 69  Resp: 14  Temp: 97 °F (36.1 °C)  Temp src: Temporal  MAP (mmHg): 94    Oxygen Therapy  SpO2: 100 %  O2 Device: None (Room air)            Physical Exam  Vitals and nursing note reviewed.   Constitutional:       Appearance: She is well-developed.   HENT:      Head: Normocephalic and atraumatic.      Mouth/Throat:      Mouth: Mucous membranes are moist.   Eyes:      General: No scleral icterus.  Cardiovascular:      Rate and Rhythm: Normal rate and regular rhythm.   Pulmonary:      Effort: Pulmonary effort is normal.      Breath sounds: Normal breath sounds.   Abdominal:      Palpations: Abdomen is soft.      Tenderness: There is no abdominal tenderness.   Musculoskeletal:         General: No tenderness.      Right lower leg: No edema.      Left lower leg: No edema.   Skin:     General: Skin is warm and dry.   Neurological:      General: No focal deficit present.      Mental Status: She is alert and oriented to person, place, and time.      Cranial Nerves: No cranial nerve deficit.      Motor: No weakness.   Psychiatric:         Mood and Affect: Mood normal.         Behavior: Behavior normal.               ED Course     Labs Reviewed   COMP METABOLIC PANEL (14) - Abnormal; Notable for the following components:       Result Value    Potassium 3.4 (*)     ALT 10 (*)     Alkaline Phosphatase 111 (*)     A/G Ratio 0.9 (*)     All other components within normal limits   PROTHROMBIN TIME (PT) - Normal   PTT, ACTIVATED - Normal   TROPONIN I HIGH SENSITIVITY - Normal   CBC WITH DIFFERENTIAL WITH PLATELET    Narrative:     The following orders were created for panel order CBC With Differential With Platelet.  Procedure                               Abnormality         Status                     ---------                                -----------         ------                     CBC W/ DIFFERENTIAL[[id number]]                              Final result                 Please view results for these tests on the individual orders.   RAINBOW DRAW LAVENDER   RAINBOW DRAW LIGHT GREEN   RAINBOW DRAW BLUE   CBC W/ DIFFERENTIAL     EKG    Rate, intervals and axes as noted on EKG Report.  Rate: 69  Rhythm: Sinus Rhythm  Reading: Normal sinus rhythm, left axis deviation, no ST/T wave changes                 CT ANGIOGRAPHY, CHEST (CPT=71275)    Result Date: 6/25/2024  CONCLUSION:  1. Negative for pulmonary embolism.  Clearing of pulmonary emboli seen on the prior study. 2. No new thoracic abnormality. 3. Details as above.  Continued clinical correlation recommended.     LOCATION:  [facility]   Dictated by (CST): Harris Metzger MD on 6/25/2024 at 4:44 PM     Finalized by (CST): Harris Metzger MD on 6/25/2024 at 4:50 PM               MDM   54-year-old female with a past medical history as below including hypertension, Graves' disease, PE no longer on Eliquis presents from her doctor's office for PE workup due to elevated D-dimer.  Patient states she went to see her doctor due to chronic cough that has been ongoing since she had COVID in 2022.      Differential includes but is not limited to chronic cough to bronchitis, reactive airway disease, less likely pneumonia or PE    Chart reviewed from PCP visit earlier today patient admitted for evaluation of chronic cough.  She had a D-dimer that was mildly elevated 0.61.    Labs are unremarkable with normal WBC, hemoglobin, renal function.  Troponin and BNP are normal.    Independent interpretation of CTA chest shows no evidence of PE.  Radiology report reviewed as above noting clearing of pulmonary emboli seen previously.  No new thoracic abnormality noted.    Unclear etiology of chronic cough.  Lungs are clear and SpO2 is 99 to 100% on room air.  Instructed to follow-up with PCP or pulmonology for further outpatient workup  and management.  Return precaution discussed.    Medical Decision Making  Amount and/or Complexity of Data Reviewed  External Data Reviewed: labs and notes.     Details: See MDM  Labs: ordered. Decision-making details documented in ED Course.  Radiology: ordered and independent interpretation performed. Decision-making details documented in ED Course.  ECG/medicine tests: ordered and independent interpretation performed. Decision-making details documented in ED Course.        Disposition and Plan     Clinical Impression:  1. Chronic cough         Disposition:  Discharge  6/25/2024  6:40 pm    Follow-up:  Yury Callaway MD  [facility] [address]  [place] [address]  [phone]    Schedule an appointment as soon as possible for a visit      Da Arenas MD  [address] [facility] [address]  [place] [address]  [phone]    Schedule an appointment as soon as possible for a visit            Medications Prescribed:  Current Discharge Medication List

## 2024-06-26 LAB
ATRIAL RATE: 69 BPM
P AXIS: 52 DEGREES
P-R INTERVAL: 202 MS
Q-T INTERVAL: 402 MS
QRS DURATION: 94 MS
QTC CALCULATION (BEZET): 430 MS
R AXIS: -36 DEGREES
T AXIS: 29 DEGREES
VENTRICULAR RATE: 69 BPM

## 2024-06-27 NOTE — TELEPHONE ENCOUNTER
Prescription was sent to the wrong pharmacy. Needs to go to [facility]    Disp Refills Start End    Irbesartan-hydroCHLOROthiazide 150-12.5 MG Oral Tab 180 tablet 2 6/25/2024 --    Sig - Route: Take 1 tablet by mouth in the morning and 1 tablet before bedtime. - Oral    Sent to pharmacy as: Irbesartan-hydroCHLOROthiazide 150-12.5 MG Oral Tablet ([facility])    E-Prescribing Status: Receipt confirmed by pharmacy (6/25/2024 11:08 AM CDT)        Shannon Mann requesting Medication Refill for:    Medication name and dose (copy and paste from medication list):   Irbesartan-hydroCHLOROthiazide 150-12.5 MG Oral Tab 180 tablet 2 6/25/2024 --    Sig - Route: Take 1 tablet by mouth in the morning and 1 tablet before bedtime. - Oral    Sent to pharmacy as: Irbesartan-hydroCHLOROthiazide 150-12.5 MG Oral Tablet ([facility])    E-Prescribing Status: Receipt confirmed by pharmacy (6/25/2024 11:08 AM CDT)        If medication is not on medication list - transfer patient to RN queue for triage    Preferred Pharmacy:   [facility] - [place], [place] - [address] [place] [address] [phone], [phone]   [address] [place] [id number]   Phone: [phone] Fax: [phone]       LOV: 6/25/2024   Last Refill date: 6/25/24  Next Scheduled appointment: 7/23/2024

## 2024-06-28 ENCOUNTER — HOSPITAL ENCOUNTER (OUTPATIENT)
Dept: MAMMOGRAPHY | Facility: HOSPITAL | Age: 54
Discharge: HOME | End: 2024-06-28
Attending: NURSE PRACTITIONER
Payer: COMMERCIAL

## 2024-06-28 DIAGNOSIS — N63.20 MASS OF LEFT BREAST, UNSPECIFIED QUADRANT: ICD-10-CM

## 2024-06-28 PROCEDURE — 76642 ULTRASOUND BREAST LIMITED: CPT | Performed by: NURSE PRACTITIONER

## 2024-06-28 PROCEDURE — 77065 DX MAMMO INCL CAD UNI: CPT | Performed by: NURSE PRACTITIONER

## 2024-06-28 PROCEDURE — 77061 BREAST TOMOSYNTHESIS UNI: CPT | Performed by: NURSE PRACTITIONER

## 2024-06-29 ENCOUNTER — HOSPITAL ENCOUNTER (OUTPATIENT)
Dept: ULTRASOUND IMAGING | Age: 54
Discharge: HOME | End: 2024-06-29
Attending: NURSE PRACTITIONER
Payer: COMMERCIAL

## 2024-06-29 DIAGNOSIS — E05.00 GRAVES DISEASE: ICD-10-CM

## 2024-06-29 DIAGNOSIS — E05.90 HYPERTHYROIDISM: ICD-10-CM

## 2024-06-29 DIAGNOSIS — E04.9 ENLARGED THYROID: ICD-10-CM

## 2024-06-29 PROCEDURE — 76536 US EXAM OF HEAD AND NECK: CPT | Performed by: NURSE PRACTITIONER

## 2024-07-01 RX ORDER — IRBESARTAN AND HYDROCHLOROTHIAZIDE 150; 12.5 MG/1; MG/1
1 TABLET, FILM COATED ORAL 2 TIMES DAILY
Qty: 180 TABLET | Refills: 2 | Status: SHIPPED | OUTPATIENT
Start: 2024-07-01

## 2024-07-02 NOTE — TELEPHONE ENCOUNTER
Please review. Rx failed/no protocol.    Patient would like prescription sent to [facility]    Requested Prescriptions   Pending Prescriptions Disp Refills    Irbesartan-hydroCHLOROthiazide 150-12.5 MG Oral Tab 180 tablet 2     Sig: Take 1 tablet by mouth in the morning and 1 tablet before bedtime.       Hypertension Medications Protocol Failed - 6/27/2024  2:37 PM        Failed - Last BP reading less than 140/90     BP Readings from Last 1 Encounters:   06/25/24 155/84               Passed - CMP or BMP in past 12 months        Passed - In person appointment or virtual visit in the past 12 mos or appointment in next 3 mos     Recent Outpatient Visits              6 days ago Chronic cough    [address] Fanta Flores APRN    Office Visit    4 months ago Acute left ankle pain    [address] Kortney Mendieta PA-C    Office Visit    6 months ago Routine general medical examination at a health care facility    [facility], [address] [place] [address] Fanta Flores APRN    Office Visit    1 year ago Chronic cholecystitis    [facility], [facility] [address] Nathan MD    Office Visit    1 year ago Other acute pulmonary embolism without acute cor pulmonale (HCC)    [facility] [place] Shyam Westfall MD    Office Visit          Future Appointments         Provider Department Appt Notes    In 1 week Sandra Madison MD [facility], [facility] Graves Disease    In 3 weeks Fanta Flores APRN [address] VV fup per [place]                    Passed - EGFRCR or GFRAA > 50     GFR Evaluation  EGFRCR: 97 , resulted on 6/25/2024               Future Appointments         Provider Department Appt Notes    In 1 week Sandra Madison MD [facility]  [place] Graves Disease    In 3 weeks Fanta Flores APRN [facility], [address] [place] [address] VV fup per [place]          Recent Outpatient Visits              6 days ago Chronic cough    [facility], [address] [place] [address] Fanta Flores APRN    Office Visit    4 months ago Acute left ankle pain    [facility], [address] [place] [address] Kortney Mendieta PA-C    Office Visit    6 months ago Routine general medical examination at a health care facility    [facility], [address] [place] [address] Fanta Flores APRN    Office Visit    1 year ago Chronic cholecystitis    [facility], [facility] [address] Nathan MD    Office Visit    1 year ago Other acute pulmonary embolism without acute cor pulmonale (HCC)    [facility] [place], Shyam LEDBETTER MD    Office Visit

## 2024-07-11 ENCOUNTER — OFFICE VISIT (OUTPATIENT)
Facility: LOCATION | Age: 54
End: 2024-07-11
Payer: COMMERCIAL

## 2024-07-11 VITALS
HEART RATE: 76 BPM | OXYGEN SATURATION: 97 % | HEIGHT: 61 IN | WEIGHT: 260.63 LBS | BODY MASS INDEX: 49.21 KG/M2 | DIASTOLIC BLOOD PRESSURE: 88 MMHG | SYSTOLIC BLOOD PRESSURE: 136 MMHG

## 2024-07-11 DIAGNOSIS — E78.5 HYPERLIPIDEMIA, UNSPECIFIED HYPERLIPIDEMIA TYPE: ICD-10-CM

## 2024-07-11 DIAGNOSIS — R73.03 PREDIABETES: ICD-10-CM

## 2024-07-11 DIAGNOSIS — Z86.711 HISTORY OF PULMONARY EMBOLISM: ICD-10-CM

## 2024-07-11 DIAGNOSIS — R05.9 COUGH, UNSPECIFIED TYPE: ICD-10-CM

## 2024-07-11 DIAGNOSIS — E66.01 MORBID OBESITY (HCC): ICD-10-CM

## 2024-07-11 DIAGNOSIS — Z90.49 HISTORY OF CHOLECYSTECTOMY: ICD-10-CM

## 2024-07-11 DIAGNOSIS — I10 PRIMARY HYPERTENSION: ICD-10-CM

## 2024-07-11 DIAGNOSIS — E05.00 GRAVES DISEASE: ICD-10-CM

## 2024-07-11 DIAGNOSIS — E05.90 HYPERTHYROIDISM: ICD-10-CM

## 2024-07-11 DIAGNOSIS — E05.00 GRAVES DISEASE: Primary | ICD-10-CM

## 2024-07-11 DIAGNOSIS — Z90.710 S/P HYSTERECTOMY: ICD-10-CM

## 2024-07-11 DIAGNOSIS — E05.00 GRAVES' ORBITOPATHY: ICD-10-CM

## 2024-07-11 DIAGNOSIS — E07.9 THYROID DISEASE: ICD-10-CM

## 2024-07-11 RX ORDER — HYDROCHLOROTHIAZIDE 25 MG/1
25 TABLET ORAL DAILY
Qty: 30 TABLET | Refills: 0 | Status: SHIPPED | OUTPATIENT
Start: 2024-07-11 | End: 2024-07-12

## 2024-07-11 RX ORDER — MULTIVIT-MIN/IRON/FOLIC ACID/K 18-600-40
CAPSULE ORAL
COMMUNITY

## 2024-07-11 NOTE — PATIENT INSTRUCTIONS
Will switch BP meds and reassess if couch from Irbesartan-hydroCHLOROthiazide to hydrochlorothiazide 25mg daily   Labs and /RTC in 1 mo     Methimazole 5 mg/day  Will check antibodies before next visit   Use eye drops and ointment   Discussed with patient possible dx, treatment options, LERMA vs surgery vs meds. Discussed thyroid and  wt gain, eye disease, and SE of meds and LERMA. Methimazole may cause significant bone marrow depression; the most severe manifestation is agranulocytosis. May also cause Hepatotoxicity (including acute liver failure) Symptoms suggestive of hepatic dysfunction (eg, anorexia, pruritus, right upper quadrant pain) should prompt evaluation. If you have nausea, vomiting, abdominal pain, jaundice- yellow skin or eye color-, dark urine, light stool color, fever, sore throat or infection, call us or the PCP.  Remission rate of ~ 40% with use of antithyroid drugs for 1-1.5 years, their side effects, monitoring, and follow-up issues, specifically agranulocytosis, liver toxicity, anaphylaxis, rash, lupus like syndrome, metallic taste in the mouth, and joint aches. We discussed that patient should discontinue methimazole at the earliest sign of a fever, sore throat, or other infection, should call our office and have WBC count with differential done. The drug should be held until the result is available.

## 2024-07-11 NOTE — PROGRESS NOTES
New Patient Evaluation - History and Physical    CONSULT - Reason for Visit:    graves , dry cough, goiter, HTN  Requesting Physician:   ..Yury Callaway MD    CHIEF COMPLAINT:    Chief Complaint   Patient presents with    Consult     For Graves Disease thyroid labs done 6/24 and thyroid US done 6/29/24. Was referred by LUCIE Lucero        HISTORY OF PRESENT ILLNESS:   Shannon Mann is a 54 year old female who presents with Graves, hyperthroidism , GO,  dry cough, goiter, HTN, low Vit D, and PE after covid.     Was on blood thinner till 2/2023    Graves was Dx in 2017. Started MMI higher doses then titrated to MMI 5 mg/day. Got in  remission in 2019  Relapse in 2022. High TSI 10/2022  Now on Methimazole 5 mg /day   Reviewed labs from outside and d/w pt.     Has GO with symptoms. Saw ophtho in the past. Uses eye drops /prednisone. Last visit 2019    Had CTA 6/2024   Feels thyroid/ neck tightness and cannot tilt head down   Has dry cough   No hyperthyroid sx   Has eye dryness and eyelid swelling.     No eye or vision changes.   No heat or cold intolerance  Hair and skin: dryness and itching  Menstrual hx: ln 2017, had HYS   Neck surgery: No  Neck radiation: No   Biotin: no  Turmeric:no       ASSESSMENT AND PLAN:  54 year old female who presents with Graves, hyperthroidism , GO,  dry cough, goiter, HTN, PE  and low Vit D  Discussed cough might be from her BP meds   She will think about surgery for goiter- reviewed images and she has large goiter on exam and US.   Clinically and biochemically, euthyroid on MMI 5 mg/day    Plan  Will switch BP meds and reassess if couch from Irbesartan-hydroCHLOROthiazide to hydrochlorothiazide 25mg daily   Labs and /RTC in 1 mo     Methimazole 5 mg/day  Will check antibodies before next visit   Use eye drops and ointment     Discussed with patient possible dx, treatment options, LERMA vs surgery vs meds. Discussed thyroid and  wt gain, eye disease, and SE of meds and LERMA. Methimazole  may cause significant bone marrow depression; the most severe manifestation is agranulocytosis. May also cause Hepatotoxicity (including acute liver failure) Symptoms suggestive of hepatic dysfunction (eg, anorexia, pruritus, right upper quadrant pain) should prompt evaluation. If you have nausea, vomiting, abdominal pain, jaundice- yellow skin or eye color-, dark urine, light stool color, fever, sore throat or infection, call us or the PCP.  Remission rate of ~ 40% with use of antithyroid drugs for 1-1.5 years, their side effects, monitoring, and follow-up issues, specifically agranulocytosis, liver toxicity, anaphylaxis, rash, lupus like syndrome, metallic taste in the mouth, and joint aches. We discussed that patient should discontinue methimazole at the earliest sign of a fever, sore throat, or other infection, should call our office and have WBC count with differential done. The drug should be held until the result is available.          PAST MEDICAL HISTORY:   Past Medical History:    Graves disease    High blood pressure    Morbid obesity with BMI of 45.0-49.9, adult (HCC)    PONV (postoperative nausea and vomiting)    Pulmonary embolism (HCC)    Unspecified essential hypertension    Visual impairment    contacts and glasses       PAST SURGICAL HISTORY:   Past Surgical History:   Procedure Laterality Date          x3    Endometrial ablation      Hernia surgery      inguinal hernai repair    Hysterectomy      Hysteroscopy,with endometrial  10/01/2006    Oophorectomy      Removal gallbladder      Total abdominal hysterectomy      Tubal ligation  2004       CURRENT MEDICATIONS:     Cholecalciferol (VITAMIN D) 50 MCG (2000) Oral Cap Take by mouth.      hydroCHLOROthiazide 25 MG Oral Tab Take 1 tablet (25 mg total) by mouth daily. 30 tablet 0    Irbesartan-hydroCHLOROthiazide 150-12.5 MG Oral Tab Take 1 tablet by mouth in the morning and 1 tablet before bedtime. 180 tablet 2    methIMAzole 5  MG Oral Tab Take 1 tablet (5 mg total) by mouth 3 (three) times daily.         ALLERGIES:  Allergies   Allergen Reactions    Neosporin Af [Miconazole Nitrate] RASH     CRREA-blisters    Sulfa Antibiotics OTHER (SEE COMMENTS)    Neomycin RASH    Sulfur-Salicylic Acid [Salicylic Acid-Sulfur] OTHER (SEE COMMENTS)     Cause the skin to bubble.       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  Goiter   + eyelid swelling   CONSTITUTIONAL:  Awake and alert. Age appropriate, good hygiene not in acute distress. Well-nourished and well developed. no acute distress   PSYCH:   Orientated to time, place, person & situation, Normal mood and affect, memory intact, normal insight and judgment, cooperative  Neuro: speech is clear. Awake, alert, no aphasia, no facial asymmetry, no nuchal rigidity  EYES:  No proptosis, no ptosis, conjunctiva normal  ENT:  Normocephalic, atraumatic  Eye: EOMI, normal lids, no discharge, no conjunctival erythema. No exophthalmos/proptosis,  Ptosis negative   No rhinorrhea, moist oral mucosa  Neck: full range of motion  Neck/Thyroid: neck inspection: normal, No scar, No goiter   LUNGS:  No acute respiratory distress, non-labored respiration. Speaking full sentences  CARDIOVASCULAR:  regular rate   ABDOMEN:  No abdominal pain.   SKIN:  no bruising or bleeding, no rashes and no lesions, Skin is dry, no obvious rashes or lesions  EXTREMITIES: no gross abnormality   MSK: Moves extremities spontaneously. full range of motion in all major joints      DATA:     Pertinent data reviewed     6/12/24  8:55 AM    TSH  0.270 - 4.200 mIU/L 1.230       02/12/2024 02/12/2024         TSH 5.508 High      --   T4, Free -- 0.83      06/29/24 14:40   US THYROID (CPT=76536)  Thyromegaly.  No discrete nodules identified.        Rpt: View report in Results Review for more information   06/25/24 16:36   CT ANGIOGRAPHY, CHEST (CPT=71275) Rpt      Latest Reference Range & Units 10/25/22 07:34 10/25/22 07:35 10/27/22 10:19 12/10/22 07:35 01/24/23 16:01   T4,Free (Direct) 0.8 - 1.7 ng/dL 6.5 (H)  4.5 (H) 0.5 (L) 0.2 (L)   TSH 0.358 - 3.740 mIU/mL <0.005 (L)  <0.005 (L) <0.005 (L) 25.300 (H)   T3 TOTAL 60 - 181 ng/dL  363 (H)      T3 FREE 2.40 - 4.20 pg/mL     1.53 (L)   THYROID STIMULATING IMMUNOGLOB <=0.54 IU/L 7.06 (H)       (H): Data is abnormally high  (L): Data is abnormally low     No results for input(s): \"TSH\", \"T4F\", \"T3F\", \"THYP\" in the last 72 hours.  No results found.    Orders Placed This Encounter   Procedures    Thyroid Stimulating Immunoglobulin    TSH RECEPTOR ANTIBODY (TBII)    TSH W Reflex To Free T4     Orders Placed This Encounter    Thyroid Stimulating Immunoglobulin     Order Specific Question:   Release to patient     Answer:   Immediate    TSH RECEPTOR ANTIBODY (TBII)     Order Specific Question:   Release to patient     Answer:   Immediate    TSH W Reflex To Free T4     Standing Status:   Future     Standing Expiration Date:   7/11/2025     Order Specific  Question:   Release to patient     Answer:   Immediate    Cholecalciferol (VITAMIN D) 50 MCG (2000 UT) Oral Cap     Sig: Take by mouth.    hydroCHLOROthiazide 25 MG Oral Tab     Sig: Take 1 tablet (25 mg total) by mouth daily.     Dispense:  30 tablet     Refill:  0          This is a specialized patient consultation in endocrinology and required comprehensive review of prior records, as well as current evaluation, with time required for consideration of complex endocrine issues and consultation. For this visit, I personally interviewed the patient, and family member if accompanied, performed the pertinent parts of the history and physical examination. ROS included screening for appropriate endocrine conditions.   Today's diagnosis and plan were reviewed in detail with the patient who states understanding and agrees with plan. I discussed with the patient possible diagnosis, differential diagnosis, need for work up, treatment options, alternatives and side effects.     Please see note for details about time spent which includes:   · pre-visit preparation  · reviewing records  · face to face time with the patient   · timely documentation of the encounter  · ordering medications/tests  · communication with care team  · care coordination    I appreciate the opportunity to be part of your patient's medical care and will keep you, as the referring and primary physicians, informed about the care of your patient. Please feel free to contact me should you have any questions.    The 21st Century Cures Act makes medical notes like these available to patients in the interest of transparency. Please be advised this is a medical document. Medical documents are intended to carry relevant information, facts as evident, and the clinical opinion of the practitioner. The medical note is intended as peer to peer communication and may appear blunt or direct. It is written in medical language and may contain abbreviations or verbiage  that are unfamiliar.   Sandra Madison MD

## 2024-07-12 RX ORDER — HYDROCHLOROTHIAZIDE 25 MG/1
25 TABLET ORAL DAILY
Qty: 90 TABLET | Refills: 1 | Status: SHIPPED | OUTPATIENT
Start: 2024-07-12

## 2024-07-12 NOTE — TELEPHONE ENCOUNTER
Endocrine Refill protocol for oral and injectable diabetic medications    Protocol Criteria:pass    -Appointment with Endocrinology completed in the last 6 months or scheduled in the next 3 months    -A1c result below 8.5% in the past 6 months      Verify the above has been completed or scheduled in the appropriate timeline. If so can send a 90 day supply with 1 refill.     Last completed office visit: 7/11/2024 Sandra Madison MD   Next scheduled Follow up: 08/27/24     Last A1c result: Last A1c value was 5.8% done 10/14/2023.

## 2024-07-23 ENCOUNTER — TELEMEDICINE (OUTPATIENT)
Dept: INTERNAL MEDICINE CLINIC | Facility: CLINIC | Age: 54
End: 2024-07-23
Payer: COMMERCIAL

## 2024-07-23 DIAGNOSIS — E05.00 GRAVES DISEASE: ICD-10-CM

## 2024-07-23 DIAGNOSIS — R73.03 PREDIABETES: ICD-10-CM

## 2024-07-23 DIAGNOSIS — I10 PRIMARY HYPERTENSION: Primary | ICD-10-CM

## 2024-07-23 DIAGNOSIS — E05.90 HYPERTHYROIDISM: ICD-10-CM

## 2024-07-23 DIAGNOSIS — E66.01 CLASS 3 SEVERE OBESITY DUE TO EXCESS CALORIES WITH SERIOUS COMORBIDITY IN ADULT, UNSPECIFIED BMI (HCC): ICD-10-CM

## 2024-07-23 DIAGNOSIS — R05.3 CHRONIC COUGH: ICD-10-CM

## 2024-07-23 PROCEDURE — 99214 OFFICE O/P EST MOD 30 MIN: CPT | Performed by: NURSE PRACTITIONER

## 2024-07-23 PROCEDURE — G2211 COMPLEX E/M VISIT ADD ON: HCPCS | Performed by: NURSE PRACTITIONER

## 2024-07-23 NOTE — PROGRESS NOTES
This visit is conducted using video and audio.  The patient consents to this service.  The patient understands and accepts financial responsibility for any deductible, co-insurance and/or co-pays associated with this service.    Time Spent: 15min      Shannon Mann is a 54 year old female.  No chief complaint on file.      HPI:   Video visit for follow up cough.  Last ov June with cough  positive d dimer prompted further evaluation with CTA  she has since followed up with Endo and thyroid US.  Hx graves.      Cough is not quite as bad but still present.      Endocrinology  Dr Madison.  Changed meds  stopped irbesartan and doubled hydrochlorothiazide to 25mg.  Bp has been stable.  He is following up with labs and ov next month and will add BMP.  The change was made to hopefully help with cough.  She has not noted much difference.  There is some thought that thyromegaly may also be contributing to the cough.  If not improved, she may need to see ENT for further recommendation regarding removal.         She has joined [employer] and is going back to the gym.  Started zepbound  has lost 10#  doing well  her insurance is covering zepbound  this is great for her.     Prediabetes   working on her weight      Patient Active Problem List   Diagnosis    Primary hypertension    S/P hysterectomy    Morbid obesity (HCC)    Hyperthyroidism    Graves disease    Prediabetes    Urinary incontinence    Primary osteoarthritis of left knee    Hyperlipidemia    History of pulmonary embolism    History of cholecystectomy     Current Outpatient Medications   Medication Sig Dispense Refill    HYDROCHLOROTHIAZIDE 25 MG Oral Tab TAKE 1 TABLET(25 MG) BY MOUTH DAILY 90 tablet 1    Cholecalciferol (VITAMIN D) 50 MCG (2000 UT) Oral Cap Take by mouth.      methIMAzole 5 MG Oral Tab Take 1 tablet (5 mg total) by mouth 3 (three) times daily.                Allergies  Allergies   Allergen Reactions    Neosporin Af [Miconazole Nitrate] RASH      CRREA-blisters    Sulfa Antibiotics OTHER (SEE COMMENTS)    Neomycin RASH    Sulfur-Salicylic Acid [Salicylic Acid-Sulfur] OTHER (SEE COMMENTS)     Cause the skin to bubble.       REVIEW OF SYSTEMS:   GENERAL HEALTH: feels well otherwise  RESPIRATORY: denies shortness of breath with exertion,  cough  CARDIOVASCULAR: denies chest pain on exertion, no palpatations  GI: denies abdominal pain and denies heartburn, no diarrhea or constipation  MUSCULOSKELETAL:  No arthralgias or myalgias  NEURO: denies headaches,   EXAM:   Limited exam as this is a video visit.  Appropriate affect, alert and oriented x 3.  No distress.  No conversational dyspnea.  Speaking in full sentences.        ASSESSMENT AND PLAN:     Encounter Diagnoses   Name Primary?    Primary hypertension irbesartna stopped by Endo  continue to monitor bp closely and check bmp on hydrochlorothiazide alone.  Will see if stopping ARB helps cough and if not, will restart.   Yes    Chronic cough  w/u in progress  has upcoming ov with pulmonary as well.        Class 3 severe obesity due to excess calories with serious comorbidity in adult, unspecified BMI (HCC)  doing well with Zepbound.      Graves disease  per endo       Hyperthyroidism     Prediabetes  continue with weight loss.         Orders Placed This Encounter   Procedures    Basic Metabolic Panel (8) [E]       Meds & Refills for this Visit:  Requested Prescriptions      No prescriptions requested or ordered in this encounter       Imaging & Consults:  None      Please note that the above visit was completed using two-way, real-time interactive audio and video communication.  There are limitations of this visit as no physical exam could be performed.  Every conscious effort was taken to allow for sufficient and adequate time.  This billing visit was spent on reviewing labs, medications, radiology tests and decision making.  Appropriate medical decision-making and tests are ordered as detailed in the plan of  care below.    Shannon Mann understands phone evaluation is not a substitute for face-to-face examination or emergency care. Patient advised to go to ER or call 911 for worsening symptoms or acute distress.

## 2024-07-29 ENCOUNTER — OFFICE VISIT (OUTPATIENT)
Facility: CLINIC | Age: 54
End: 2024-07-29
Payer: COMMERCIAL

## 2024-07-29 VITALS
SYSTOLIC BLOOD PRESSURE: 136 MMHG | OXYGEN SATURATION: 99 % | WEIGHT: 260 LBS | HEIGHT: 61 IN | HEART RATE: 81 BPM | RESPIRATION RATE: 16 BRPM | BODY MASS INDEX: 49.09 KG/M2 | DIASTOLIC BLOOD PRESSURE: 84 MMHG

## 2024-07-29 DIAGNOSIS — R05.3 CHRONIC COUGH: Primary | ICD-10-CM

## 2024-07-29 PROCEDURE — 3079F DIAST BP 80-89 MM HG: CPT | Performed by: INTERNAL MEDICINE

## 2024-07-29 PROCEDURE — 3008F BODY MASS INDEX DOCD: CPT | Performed by: INTERNAL MEDICINE

## 2024-07-29 PROCEDURE — 99204 OFFICE O/P NEW MOD 45 MIN: CPT | Performed by: INTERNAL MEDICINE

## 2024-07-29 PROCEDURE — 3075F SYST BP GE 130 - 139MM HG: CPT | Performed by: INTERNAL MEDICINE

## 2024-07-29 RX ORDER — IPRATROPIUM BROMIDE 42 UG/1
1 SPRAY, METERED NASAL NIGHTLY
Qty: 1 EACH | Refills: 5 | Status: SHIPPED | OUTPATIENT
Start: 2024-07-29

## 2024-07-29 RX ORDER — TIRZEPATIDE 2.5 MG/.5ML
INJECTION, SOLUTION SUBCUTANEOUS
COMMUNITY
Start: 2024-07-12

## 2024-07-29 NOTE — PROGRESS NOTES
[facility] Pul[facility] Consult Note    Chief Complaint:  Chief Complaint   Patient presents with    New Patient     Pt c/o cough x 2 years after having covid        History of Present Illness:  Shannon Mann is a 54 year old female who presents today for evaluation of chronic cough.  Cough started 2 years ago after covid.  It is mostly dry.  It is worse mostly at night.  about the same with food.  Significant postnasal drip.  Patient denies fevers, chills, nausea, or vomiting.  No history of asthma or COPD.  Notable workup thus far includes CT chest negative.  Treatments tried thus far includes albuterol, zyrtec, medication changes.  Never smoker.  History of asthma had been in reasonable control until this.  (Asthma a/w gestation 20+ years).    .      Past Medical History:   Past Medical History:    Graves disease    High blood pressure    History of blood clots    Bllod clotnin lung    Morbid obesity with BMI of 45.0-49.9, adult (HCC)    Obesity    PONV (postoperative nausea and vomiting)    Pulmonary embolism (HCC)    Unspecified essential hypertension    Visual impairment    contacts and glasses        Past Surgical History:   Past Surgical History:   Procedure Laterality Date          x3    Cholecystectomy  3/2023    Colonoscopy  2017    Endometrial ablation      Hernia surgery      inguinal hernai repair    Hysterectomy      Hysteroscopy,with endometrial  10/01/2006    Oophorectomy      Removal gallbladder      Total abdominal hysterectomy      Tubal ligation  2004       Family Medical History:   Family History   Problem Relation Age of Onset    Other (CAD) Father     Other (HTN) Maternal Grandfather     Other (HTN) Mother     Cancer Mother     Hypertension Mother         Social History:   Social History     Socioeconomic History    Marital status:      Spouse name: Not on file    Number of children: Not on file    Years of education: Not on file    Highest education level: Not on file    Occupational History    Not on file   Tobacco Use    Smoking status: Never     Passive exposure: Past    Smokeless tobacco: Never   Vaping Use    Vaping status: Never Used   Substance and Sexual Activity    Alcohol use: Not Currently     Alcohol/week: 1.0 standard drink of alcohol    Drug use: No    Sexual activity: Yes   Other Topics Concern     Service Not Asked    Blood Transfusions Not Asked    Caffeine Concern Not Asked    Occupational Exposure Not Asked    Hobby Hazards Not Asked    Sleep Concern Not Asked    Stress Concern Not Asked    Weight Concern Not Asked    Special Diet Not Asked    Back Care Not Asked    Exercise No    Bike Helmet Not Asked    Seat Belt Not Asked    Self-Exams Not Asked   Social History Narrative    Not on file     Social Determinants of Health     Financial Resource Strain: Not on file   Food Insecurity: Not on file   Transportation Needs: Not on file   Physical Activity: Not on file   Stress: Not on file   Social Connections: Not on file   Housing Stability: At Risk (8/18/2023)    [facility] [employer]     Living Situation: Not on file     Housing Problems: Not on file        Allergies: Neosporin af [miconazole nitrate], Sulfa antibiotics, Neomycin, and Sulfur-salicylic acid [salicylic acid-sulfur]     Medications:   Current Outpatient Medications   Medication Sig Dispense Refill    ZEPBOUND 2.5 MG/0.5ML Subcutaneous Solution Auto-injector ADMINISTER 2.5 MG UNDER THE SKIN 1 TIME WEEKLY FOR 4 WEEKS      ipratropium 0.06 % Nasal Solution 1 spray by Nasal route nightly. 1 sprays in each nostril nightly 1 each 5    HYDROCHLOROTHIAZIDE 25 MG Oral Tab TAKE 1 TABLET(25 MG) BY MOUTH DAILY 90 tablet 1    Cholecalciferol (VITAMIN D) 50 MCG (2000 UT) Oral Cap Take by mouth.      methIMAzole 5 MG Oral Tab Take 1 tablet (5 mg total) by mouth 3 (three) times daily.         Review of Systems: Review of Systems    Physical Exam:  /84 (BP Location:  Right arm, Patient Position: Sitting, Cuff Size: large)   Pulse 81   Resp 16   Ht 5' 1\" (1.549 m)   Wt 260 lb (117.9 kg)   LMP 01/23/2017 (LMP Unknown)   SpO2 99%   BMI 49.13 kg/m²      Constitutional: alert, cooperative. No acute distress.  HEENT: Head NC/AT. Nares normal. Septum midline. Mucosa normal. No drainage or sinus tenderness.. Mallampati 1+  Cardio: Regular rate and rhythm. Normal S1 and S2. No murmurs.   Respiratory: Thorax symmetrical with no labored breathing. clear to auscultation bilaterally  GI: NABS. Abd soft, non-tender.  Extremities: No clubbing or cyanosis. No BLE edema.    Neurologic: A&Ox3. No gross motor deficits.  Skin: Warm, dry  Psych: Calm, cooperative. Pleasant affect.    Results:  Personally reviewed  WBC: 6.1, done on 6/25/2024.  HGB: 13.1, done on 6/25/2024.  PLT: 269, done on 6/25/2024.     Glucose: 91, done on 6/25/2024.  Cr: 0.74, done on 6/25/2024.  Last eGFR was 97 on 6/25/2024.  CA: 9.3, done on 6/25/2024.  Na: 139, done on 6/25/2024.  K: 3.4, done on 6/25/2024.  Cl: 103, done on 6/25/2024.  CO2: 32, done on 6/25/2024.  Last ALB was 3.5% done on 6/25/2024.     US THYROID (CPT=76536)    Result Date: 6/29/2024  CONCLUSION:  Thyromegaly.  No discrete nodules identified.   ACR TI-RADS recommendations: TR5 - FNA if greater than or equal to 1 cm, follow-up if 0.5-0.9 cm every year for 5 years. TR4 - FNA if greater than or equal to 1.5 cm, follow-up if 1-1.4 cm in 1, 2, 3 and 5 years. TR3 - FNA if greater than or equal to 2.5 cm, follow-up if 1.5-2.4 cm in 1, 3 and 5 years TR1 and TR2 - no FNA or follow-up  Please note ACR TI-RADS recommendations are based on imaging features and size of the nodule only.  In certain clinical circumstances additional or alternative evaluation may be indicated.   LOCATION:  [facility]        Dictated by (CST): Eugenio Lu MD on 6/29/2024 at 5:06 PM     Finalized by (CST): Eugenio Lu MD on 6/29/2024 at 5:07 PM       US BREAST LEFT LIMITED (MARIEL  SAME DAY US)(CPT=76642)    Result Date: 6/28/2024  CONCLUSION:  **PLEASE SEE REPORT FOR DIAGNOSTIC MAMMOGRAM**   LOCATION:  [facility]            Dictated by (CST): Eugenio Carrillo MD on 6/28/2024 at 10:47 AM     Finalized by (CST): Eugenio Carrillo MD on 6/28/2024 at 10:47 AM       MARIEL KIM 2D+3D DIAGNOSTIC MARIEL LEFT (CPT=77065/63287)    Result Date: 6/28/2024  CONCLUSION:  No sonographic or mammographic evidence of malignancy.  BI-RADS CATEGORY:  DIAGNOSTIC CATEGORY 2--BENIGN FINDING NO CHANGE FROM COMPARISON ASSESSMENT.   RECOMMENDATIONS:  ROUTINE MAMMOGRAM AND CLINICAL EVALUATION IN 12 MONTHS.      A letter explaining the results in lay terms has been sent to the patient.  This exam was evaluated with a computer-aided device.  This patient's information has been entered into a reminder system with a target due date for the next mammogram.   LOCATION:  [facility]       Dictated by ([facility]): Eugenio Carrillo MD on 6/28/2024 at 8:31 AM     Finalized by (CST): Eugenio Carrillo MD on 6/28/2024 at 8:33 AM       CT ANGIOGRAPHY, CHEST (CPT=71275)    Result Date: 6/25/2024  CONCLUSION:  1. Negative for pulmonary embolism.  Clearing of pulmonary emboli seen on the prior study. 2. No new thoracic abnormality. 3. Details as above.  Continued clinical correlation recommended.     LOCATION:  [facility]   Dictated by (CST): Harris Metzger MD on 6/25/2024 at 4:44 PM     Finalized by (CST): Harris Metzger MD on 6/25/2024 at 4:50 PM         Assessment/Plan:  #1. Chronic cough  Start nasal rinses followed by atrovent spray  If no improvement would try Symbicort in the event this is more pulmonary related  If no improvement  All questions answered        No follow-ups on file.    Sanjuana Taylor MD  7/29/2024

## 2024-08-23 ENCOUNTER — LAB ENCOUNTER (OUTPATIENT)
Dept: LAB | Age: 54
End: 2024-08-23
Attending: NURSE PRACTITIONER
Payer: COMMERCIAL

## 2024-08-23 DIAGNOSIS — E07.9 THYROID DISEASE: ICD-10-CM

## 2024-08-23 DIAGNOSIS — R74.8 ELEVATED ALKALINE PHOSPHATASE LEVEL: ICD-10-CM

## 2024-08-23 DIAGNOSIS — R05.3 CHRONIC COUGH: ICD-10-CM

## 2024-08-23 DIAGNOSIS — Z86.711 HISTORY OF PULMONARY EMBOLISM: ICD-10-CM

## 2024-08-23 DIAGNOSIS — I10 PRIMARY HYPERTENSION: ICD-10-CM

## 2024-08-23 DIAGNOSIS — Z90.710 S/P HYSTERECTOMY: ICD-10-CM

## 2024-08-23 DIAGNOSIS — E05.00 GRAVES DISEASE: ICD-10-CM

## 2024-08-23 DIAGNOSIS — Z90.49 HISTORY OF CHOLECYSTECTOMY: ICD-10-CM

## 2024-08-23 DIAGNOSIS — E05.90 HYPERTHYROIDISM: ICD-10-CM

## 2024-08-23 DIAGNOSIS — R73.03 PREDIABETES: ICD-10-CM

## 2024-08-23 DIAGNOSIS — E66.01 CLASS 3 SEVERE OBESITY DUE TO EXCESS CALORIES WITH SERIOUS COMORBIDITY IN ADULT, UNSPECIFIED BMI (HCC): ICD-10-CM

## 2024-08-23 DIAGNOSIS — E66.01 MORBID OBESITY (HCC): ICD-10-CM

## 2024-08-23 DIAGNOSIS — E78.5 HYPERLIPIDEMIA, UNSPECIFIED HYPERLIPIDEMIA TYPE: ICD-10-CM

## 2024-08-23 LAB
ALBUMIN SERPL-MCNC: 4.3 G/DL (ref 3.2–4.8)
ALBUMIN/GLOB SERPL: 1.2 {RATIO} (ref 1–2)
ALP LIVER SERPL-CCNC: 75 U/L
ALT SERPL-CCNC: <7 U/L
ANION GAP SERPL CALC-SCNC: 0 MMOL/L (ref 0–18)
AST SERPL-CCNC: 16 U/L (ref ?–34)
BILIRUB SERPL-MCNC: 0.7 MG/DL (ref 0.3–1.2)
BUN BLD-MCNC: 11 MG/DL (ref 9–23)
CALCIUM BLD-MCNC: 10.1 MG/DL (ref 8.7–10.4)
CHLORIDE SERPL-SCNC: 102 MMOL/L (ref 98–112)
CO2 SERPL-SCNC: 36 MMOL/L (ref 21–32)
CREAT BLD-MCNC: 0.75 MG/DL
EGFRCR SERPLBLD CKD-EPI 2021: 96 ML/MIN/1.73M2 (ref 60–?)
FASTING STATUS PATIENT QL REPORTED: NO
GLOBULIN PLAS-MCNC: 3.5 G/DL (ref 2–3.5)
GLUCOSE BLD-MCNC: 84 MG/DL (ref 70–99)
OSMOLALITY SERPL CALC.SUM OF ELEC: 285 MOSM/KG (ref 275–295)
POTASSIUM SERPL-SCNC: 3.4 MMOL/L (ref 3.5–5.1)
PROT SERPL-MCNC: 7.8 G/DL (ref 5.7–8.2)
SODIUM SERPL-SCNC: 138 MMOL/L (ref 136–145)
TSI SER-ACNC: 1.29 MIU/ML (ref 0.55–4.78)

## 2024-08-23 PROCEDURE — 36415 COLL VENOUS BLD VENIPUNCTURE: CPT | Performed by: INTERNAL MEDICINE

## 2024-08-23 PROCEDURE — 84080 ASSAY ALKALINE PHOSPHATASES: CPT

## 2024-08-23 PROCEDURE — 84443 ASSAY THYROID STIM HORMONE: CPT

## 2024-08-23 PROCEDURE — 84075 ASSAY ALKALINE PHOSPHATASE: CPT

## 2024-08-23 PROCEDURE — 80053 COMPREHEN METABOLIC PANEL: CPT

## 2024-08-23 PROCEDURE — 84445 ASSAY OF TSI GLOBULIN: CPT | Performed by: INTERNAL MEDICINE

## 2024-08-24 DIAGNOSIS — E87.6 HYPOKALEMIA: Primary | ICD-10-CM

## 2024-08-24 RX ORDER — POTASSIUM CHLORIDE 750 MG/1
10 CAPSULE, EXTENDED RELEASE ORAL 2 TIMES DAILY
Qty: 90 CAPSULE | Refills: 1 | Status: SHIPPED | OUTPATIENT
Start: 2024-08-24

## 2024-08-25 LAB — THY STIM IMMUNO: 0.44 IU/L

## 2024-08-26 LAB
ALK PHOSPHATASE: 79 IU/L
BONE FRAC: 22 %
INTESTINAL FRAC: 16 %
LIVER FRAC: 63 %

## 2024-08-27 ENCOUNTER — OFFICE VISIT (OUTPATIENT)
Facility: LOCATION | Age: 54
End: 2024-08-27
Payer: COMMERCIAL

## 2024-08-27 VITALS
WEIGHT: 257 LBS | BODY MASS INDEX: 50.45 KG/M2 | SYSTOLIC BLOOD PRESSURE: 130 MMHG | DIASTOLIC BLOOD PRESSURE: 88 MMHG | HEIGHT: 60 IN

## 2024-08-27 DIAGNOSIS — E05.90 HYPERTHYROIDISM: ICD-10-CM

## 2024-08-27 DIAGNOSIS — R05.9 COUGH, UNSPECIFIED TYPE: ICD-10-CM

## 2024-08-27 DIAGNOSIS — I10 PRIMARY HYPERTENSION: ICD-10-CM

## 2024-08-27 DIAGNOSIS — Z86.711 HISTORY OF PULMONARY EMBOLISM: ICD-10-CM

## 2024-08-27 DIAGNOSIS — R73.03 PREDIABETES: ICD-10-CM

## 2024-08-27 DIAGNOSIS — E05.00 GRAVES DISEASE: Primary | ICD-10-CM

## 2024-08-27 DIAGNOSIS — Z90.710 S/P HYSTERECTOMY: ICD-10-CM

## 2024-08-27 DIAGNOSIS — Z90.49 HISTORY OF CHOLECYSTECTOMY: ICD-10-CM

## 2024-08-27 DIAGNOSIS — E05.00 GRAVES' ORBITOPATHY: ICD-10-CM

## 2024-08-27 DIAGNOSIS — E66.01 MORBID OBESITY (HCC): ICD-10-CM

## 2024-08-27 DIAGNOSIS — E07.9 THYROID DISEASE: ICD-10-CM

## 2024-08-27 PROCEDURE — 3008F BODY MASS INDEX DOCD: CPT | Performed by: INTERNAL MEDICINE

## 2024-08-27 PROCEDURE — 99214 OFFICE O/P EST MOD 30 MIN: CPT | Performed by: INTERNAL MEDICINE

## 2024-08-27 PROCEDURE — 3075F SYST BP GE 130 - 139MM HG: CPT | Performed by: INTERNAL MEDICINE

## 2024-08-27 PROCEDURE — 3079F DIAST BP 80-89 MM HG: CPT | Performed by: INTERNAL MEDICINE

## 2024-08-27 RX ORDER — PANTOPRAZOLE SODIUM 40 MG/1
40 TABLET, DELAYED RELEASE ORAL
Qty: 90 TABLET | Refills: 0 | Status: SHIPPED | OUTPATIENT
Start: 2024-08-27

## 2024-08-27 NOTE — PROGRESS NOTES
Reason for Visit:    graves , dry cough, goiter, HTN  Requesting Physician:   ..Yury Callaawy MD    CHIEF COMPLAINT:    Chief Complaint   Patient presents with    Follow - Up        HISTORY OF PRESENT ILLNESS:   Shannon Mann is a 54 year old female who presents with Graves, hyperthroidism , GO,  dry cough, goiter, HTN, low Vit D, and PE after covid. Was on blood thinner till 2/2023    Graves was Dx in 2017. Started MMI higher doses then titrated to MMI 5 mg/day. Got in  remission in 2019  Relapse in 2022. High TSI 10/2022. TSI is normal 8/2024    Lost wt on Zepbound 5 mg/week now  Dry eye still. Needs eye exam   Cough is the same with the med change I did last time. Cough started 2 yrs ago  Now on Methimazole 5 mg /day      Will see Ent for cough   Saw pulmonary     Has GO with symptoms. Saw ophtho in the past. Uses eye drops /prednisone.   Had CTA 6/2024     No hyperthyroid sx   Has eye dryness and eyelid swelling.     Hair and skin: dryness and itching  Menstrual hx: ln 2017, had HYS   Neck surgery: No  Neck radiation: No   Biotin: no  Turmeric:no      Discussed her labs and TSI is normal now       Latest Reference Range & Units 08/23/24 16:47   Thy Stim Immuno 0.00 - 0.55 IU/L 0.44     ASSESSMENT AND PLAN:  54 year old female who presents with Graves, hyperthroidism , GO,  dry cough, goiter, HTN, PE  and low Vit D  Discussed cough again and will see ENT>    Clinically and biochemically, euthyroid on MMI 5 mg/day  Normal TSI in 8/2024. We discussed remission and will hold MMI now. Will monitor clinically and assess with labs. D/w pt hyperthyroid symptoms again.    Plan  If you get hyperthyroid symptoms, do labs soon and RTC in 1-2 mo   Use eye drops and ointment   Will see ENT   Will do a trial of Pantoprazole     Discussed with patient possible dx, treatment options, LERMA vs surgery vs meds. Discussed thyroid and  wt gain, eye disease, and SE of meds and LERMA. Methimazole may cause significant bone marrow  depression; the most severe manifestation is agranulocytosis. May also cause Hepatotoxicity (including acute liver failure) Symptoms suggestive of hepatic dysfunction (eg, anorexia, pruritus, right upper quadrant pain) should prompt evaluation. If you have nausea, vomiting, abdominal pain, jaundice- yellow skin or eye color-, dark urine, light stool color, fever, sore throat or infection, call us or the PCP.  Remission rate of ~ 40% with use of antithyroid drugs for 1-1.5 years, their side effects, monitoring, and follow-up issues, specifically agranulocytosis, liver toxicity, anaphylaxis, rash, lupus like syndrome, metallic taste in the mouth, and joint aches. We discussed that patient should discontinue methimazole at the earliest sign of a fever, sore throat, or other infection, should call our office and have WBC count with differential done. The drug should be held until the result is available.          PAST MEDICAL HISTORY:   Past Medical History:    Graves disease    High blood pressure    History of blood clots    Bllod clotnin lung    Morbid obesity with BMI of 45.0-49.9, adult (HCC)    Obesity    PONV (postoperative nausea and vomiting)    Pulmonary embolism (HCC)    Unspecified essential hypertension    Visual impairment    contacts and glasses       PAST SURGICAL HISTORY:   Past Surgical History:   Procedure Laterality Date          x3    Cholecystectomy  3/2023    Colonoscopy  2017    Endometrial ablation      Hernia surgery      inguinal hernai repair    Hysterectomy      Hysteroscopy,with endometrial  10/01/2006    Oophorectomy      Removal gallbladder      Total abdominal hysterectomy      Tubal ligation  2004       CURRENT MEDICATIONS:     pantoprazole 40 MG Oral Tab EC Take 1 tablet (40 mg total) by mouth every morning before breakfast. 90 tablet 0    Potassium Chloride ER 10 MEQ Oral Cap CR Take 1 capsule (10 mEq total) by mouth 2 (two) times daily. 90 capsule 1    ZEPBOUND 2.5  MG/0.5ML Subcutaneous Solution Auto-injector ADMINISTER 2.5 MG UNDER THE SKIN 1 TIME WEEKLY FOR 4 WEEKS      ipratropium 0.06 % Nasal Solution 1 spray by Nasal route nightly. 1 sprays in each nostril nightly 1 each 5    HYDROCHLOROTHIAZIDE 25 MG Oral Tab TAKE 1 TABLET(25 MG) BY MOUTH DAILY 90 tablet 1    Cholecalciferol (VITAMIN D) 50 MCG (2000 UT) Oral Cap Take by mouth.         ALLERGIES:  Allergies   Allergen Reactions    Neosporin Af [Miconazole Nitrate] RASH     CRREA-blisters    Sulfa Antibiotics OTHER (SEE COMMENTS)    Neomycin RASH    Sulfur-Salicylic Acid [Salicylic Acid-Sulfur] OTHER (SEE COMMENTS)     Cause the skin to bubble.       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  No tremors   Goiter   + eyelid swelling   CONSTITUTIONAL:  Awake and alert. Age appropriate, good hygiene not in acute distress. Well-nourished and well developed. no acute distress   PSYCH:   Orientated to time, place, person & situation, Normal mood and affect, memory intact, normal insight and judgment, cooperative  Neuro: speech is  clear. Awake, alert, no aphasia, no facial asymmetry, no nuchal rigidity  EYES:  No proptosis, no ptosis, conjunctiva normal  ENT:  Normocephalic, atraumatic  Eye: EOMI, normal lids, no discharge, no conjunctival erythema. No exophthalmos/proptosis, Ptosis negative   No rhinorrhea, moist oral mucosa  Neck: full range of motion        DATA:     Pertinent data reviewed         06/29/24 14:40   US THYROID (CPT=76536)  Thyromegaly.  No discrete nodules identified.        Rpt: View report in Results Review for more information   06/25/24 16:36   CT ANGIOGRAPHY, CHEST (CPT=71275) Rpt      Latest Reference Range & Units 10/25/22 07:34 10/25/22 07:35 10/27/22 10:19 12/10/22 07:35 01/24/23 16:01   T4,Free (Direct) 0.8 - 1.7 ng/dL 6.5 (H)  4.5 (H) 0.5 (L) 0.2 (L)   TSH 0.358 - 3.740 mIU/mL <0.005 (L)  <0.005 (L) <0.005 (L) 25.300 (H)   T3 TOTAL 60 - 181 ng/dL  363 (H)      T3 FREE 2.40 - 4.20 pg/mL     1.53 (L)   THYROID STIMULATING IMMUNOGLOB <=0.54 IU/L 7.06 (H)       (H): Data is abnormally high  (L): Data is abnormally low     No results for input(s): \"TSH\", \"T4F\", \"T3F\", \"THYP\" in the last 72 hours.  No results found.    Orders Placed This Encounter   Procedures    TSH W Reflex To Free T4    TSH W Reflex To Free T4     Orders Placed This Encounter    TSH W Reflex To Free T4     Standing Status:   Future     Standing Expiration Date:   8/27/2025    TSH W Reflex To Free T4     Standing Status:   Future     Standing Expiration Date:   8/27/2025    pantoprazole 40 MG Oral Tab EC     Sig: Take 1 tablet (40 mg total) by mouth every morning before breakfast.     Dispense:  90 tablet     Refill:  0          This is a specialized patient consultation in endocrinology and required comprehensive review of prior records, as well as current evaluation, with time required for consideration of complex endocrine issues and consultation. For this visit, I personally interviewed the patient, and family member if accompanied, performed the  pertinent parts of the history and physical examination. ROS included screening for appropriate endocrine conditions.   Today's diagnosis and plan were reviewed in detail with the patient who states understanding and agrees with plan. I discussed with the patient possible diagnosis, differential diagnosis, need for work up, treatment options, alternatives and side effects.     Please see note for details about time spent which includes:   · pre-visit preparation  · reviewing records  · face to face time with the patient   · timely documentation of the encounter  · ordering medications/tests  · communication with care team  · care coordination    I appreciate the opportunity to be part of your patient's medical care and will keep you, as the referring and primary physicians, informed about the care of your patient. Please feel free to contact me should you have any questions.    The 21st Century Cures Act makes medical notes like these available to patients in the interest of transparency. Please be advised this is a medical document. Medical documents are intended to carry relevant information, facts as evident, and the clinical opinion of the practitioner. The medical note is intended as peer to peer communication and may appear blunt or direct. It is written in medical language and may contain abbreviations or verbiage that are unfamiliar.   Sandra Madison MD

## 2024-08-27 NOTE — PATIENT INSTRUCTIONS
TSI is low so will stop methimazole now   Will reassess by symptoms and labs   If you get hyperthyroid symptoms, do labs soon and RTC in 1-2 mo   Use eye drops and ointment   Will see ENT   Will try Pantoprazole     Discussed with patient possible dx, treatment options, LERMA vs surgery vs meds. Discussed thyroid and  wt gain, eye disease, and SE of meds and LERMA. Methimazole may cause significant bone marrow depression; the most severe manifestation is agranulocytosis. May also cause Hepatotoxicity (including acute liver failure) Symptoms suggestive of hepatic dysfunction (eg, anorexia, pruritus, right upper quadrant pain) should prompt evaluation. If you have nausea, vomiting, abdominal pain, jaundice- yellow skin or eye color-, dark urine, light stool color, fever, sore throat or infection, call us or the PCP.  Remission rate of ~ 40% with use of antithyroid drugs for 1-1.5 years, their side effects, monitoring, and follow-up issues, specifically agranulocytosis, liver toxicity, anaphylaxis, rash, lupus like syndrome, metallic taste in the mouth, and joint aches. We discussed that patient should discontinue methimazole at the earliest sign of a fever, sore throat, or other infection, should call our office and have WBC count with differential done. The drug should be held until the result is available.

## 2024-09-04 ENCOUNTER — OFFICE VISIT (OUTPATIENT)
Facility: LOCATION | Age: 54
End: 2024-09-04

## 2024-09-04 DIAGNOSIS — J34.3 HYPERTROPHY OF NASAL TURBINATES: ICD-10-CM

## 2024-09-04 DIAGNOSIS — J30.1 ALLERGIC RHINITIS DUE TO POLLEN, UNSPECIFIED SEASONALITY: ICD-10-CM

## 2024-09-04 DIAGNOSIS — R05.3 CHRONIC COUGH: ICD-10-CM

## 2024-09-04 DIAGNOSIS — K21.9 LARYNGOPHARYNGEAL REFLUX (LPR): Primary | ICD-10-CM

## 2024-09-04 PROCEDURE — 99203 OFFICE O/P NEW LOW 30 MIN: CPT | Performed by: STUDENT IN AN ORGANIZED HEALTH CARE EDUCATION/TRAINING PROGRAM

## 2024-09-04 PROCEDURE — 31575 DIAGNOSTIC LARYNGOSCOPY: CPT | Performed by: STUDENT IN AN ORGANIZED HEALTH CARE EDUCATION/TRAINING PROGRAM

## 2024-09-04 RX ORDER — AZELASTINE 1 MG/ML
2 SPRAY, METERED NASAL 2 TIMES DAILY
Qty: 30 ML | Refills: 3 | Status: SHIPPED | OUTPATIENT
Start: 2024-09-04

## 2024-09-04 RX ORDER — FLUTICASONE PROPIONATE 50 MCG
2 SPRAY, SUSPENSION (ML) NASAL 2 TIMES DAILY
Qty: 16 G | Refills: 3 | Status: SHIPPED | OUTPATIENT
Start: 2024-09-04

## 2024-09-05 NOTE — PROGRESS NOTES
[facility]  OTOLARYNGOLOG[facility] - HEAD & NECK SURGERY    2024     Reason for Consultation:   Chronic cough    History of Present Illness:   Patient is a pleasant 54 year old female who is being seen for chronic cough which she developed after having COVID in .  The patient states that prior to this she did not have any problems with chronic cough.  She does not have any history of asthma.  Since her COVID infection she has had chronic cough which has fluctuated.  At times it does bother her at night and at times she does not have an issue at night.  She has been seen by pulmonologist and was started on Atrovent nasal spray.  She was also seen by her endocrinologist  and was prescribed pantoprazole which she has not started yet.  She does have a history of allergic rhinitis but states that her cough does not correlate with her allergy symptoms.  She does not have any overt symptoms of gastroesophageal reflux.  She states that sometimes the cough is dry and at times it can be productive.    Past Medical History  Past Medical History:    Graves disease    High blood pressure    History of blood clots    Bllod clotnin lung    Morbid obesity with BMI of 45.0-49.9, adult (HCC)    Obesity    PONV (postoperative nausea and vomiting)    Pulmonary embolism (HCC)    Unspecified essential hypertension    Visual impairment    contacts and glasses       Past Surgical History  Past Surgical History:   Procedure Laterality Date          x3    Cholecystectomy  3/2023    Colonoscopy  2017    Endometrial ablation      Hernia surgery      inguinal hernai repair    Hysterectomy      Hysteroscopy,with endometrial  10/01/2006    Oophorectomy      Removal gallbladder      Total abdominal hysterectomy      Tubal ligation  2004       Family History  Family History   Problem Relation Age of Onset    Other (CAD) Father     Other (HTN) Maternal Grandfather     Other (HTN) Mother     Cancer Mother     Hypertension  Mother        Social History  Pediatric History   Patient Parents    Maricarmen Ramirez (Mother)     Other Topics Concern     Service Not Asked    Blood Transfusions Not Asked    Caffeine Concern Not Asked    Occupational Exposure Not Asked    Hobby Hazards Not Asked    Sleep Concern Not Asked    Stress Concern Not Asked    Weight Concern Not Asked    Special Diet Not Asked    Back Care Not Asked    Exercise No    Bike Helmet Not Asked    Seat Belt Not Asked    Self-Exams Not Asked   Social History Narrative    Not on file           Current Medications:  Current Outpatient Medications   Medication Sig Dispense Refill    azelastine 0.1 % Nasal Solution 2 sprays by Nasal route 2 (two) times daily. 30 mL 3    fluticasone propionate 50 MCG/ACT Nasal Suspension 2 sprays by Nasal route 2 (two) times daily. 16 g 3    pantoprazole 40 MG Oral Tab EC Take 1 tablet (40 mg total) by mouth every morning before breakfast. 90 tablet 0    Potassium Chloride ER 10 MEQ Oral Cap CR Take 1 capsule (10 mEq total) by mouth 2 (two) times daily. 90 capsule 1    ZEPBOUND 2.5 MG/0.5ML Subcutaneous Solution Auto-injector ADMINISTER 2.5 MG UNDER THE SKIN 1 TIME WEEKLY FOR 4 WEEKS      HYDROCHLOROTHIAZIDE 25 MG Oral Tab TAKE 1 TABLET(25 MG) BY MOUTH DAILY 90 tablet 1    Cholecalciferol (VITAMIN D) 50 MCG (2000 UT) Oral Cap Take by mouth.         Allergies  Allergies   Allergen Reactions    Neosporin Af [Miconazole Nitrate] RASH     CRREA-blisters    Sulfa Antibiotics OTHER (SEE COMMENTS)    Neomycin RASH    Sulfur-Salicylic Acid [Salicylic Acid-Sulfur] OTHER (SEE COMMENTS)     Cause the skin to bubble.       Review of Systems:   A comprehensive 10 point review of systems was completed.  Pertinent positives and negatives noted in the the HPI.    Physical Exam:   Last menstrual period 01/23/2017, not currently breastfeeding.    GENERAL: No acute distress, Comfortable appearing  FACE: HB 1/6, Normal Animation  HEAD: Normocephalic  EYES:  EOMI, pupils equil  EARS: Bilateral Auricles Symmetric, bilateral tympanic membranes appear normal  NOSE: Nares patent bilaterally  ORAL CAVITY: Tongue mobile, Oropharynx clear, Floor of mouth clear, Posterior oropharynx normal  NECK: No palpable lymphadenopathy, thyroid not palpable, nontender    PROCEDURE: FLEXIBLE FIBEROPTIC LARYNGOSCOPY (37764)    Due to inability for adequate examination of the larynx and need for magnification to perform the examination, endoscopy was performed.  Risks and benefits were discussed with patient/family and they have given verbal consent to proceed.    Procedure Detail & Findings:     After placement of topical anesthetic intranasally the flexible laryngoscope was inserted into the nare and driven through the nasal cavity with no significant abnormal findings noted in the nasal cavities or nasopharynx. The oropharynx, hypopharynx and larynx were subsequently examined and the following findings were noted:    Nasal cavity and nasopharynx: The bilateral inferior turbinates appear to have pale edema, and the sinus cavities appear to have postoperative changes and are patent.  There are no obvious polyps noted.  There is a significant amount of postnasal drip noted along the turbinates and down through the nasopharynx.    Base of Tongue: Normal    Valeculla: Normal    Arytenoids: Normal    Introitus of the esophagus: Moderate edema noted    Epiglottis: Normal    False vocal cords: Normal    True vocal cords: Normal    Subglottic space: Normal    Mobility of True vocal cords: Normal    Condition: Stable    Complications: Patient tolerated the procedure well with no immediate complication.      Results:     Laboratory Data:  Lab Results   Component Value Date    WBC 6.1 06/25/2024    HGB 13.1 06/25/2024    HCT 37.0 06/25/2024    .0 06/25/2024    CREATSERUM 0.75 08/23/2024    BUN 11 08/23/2024     08/23/2024    K 3.4 (L) 08/23/2024     08/23/2024    CO2 36.0 (H)  08/23/2024    GLU 84 08/23/2024    CA 10.1 08/23/2024    ALB 4.3 08/23/2024    ALKPHO 75 08/23/2024    TP 7.8 08/23/2024    AST 16 08/23/2024    ALT <7 (L) 08/23/2024    PTT 32.4 06/25/2024    INR 0.92 06/25/2024    PTP 12.4 06/25/2024    T4F 0.7 (L) 02/17/2023    TSH 1.295 08/23/2024    DELPHINE 158 (H) 03/12/2017    LIP 36 04/07/2023    DDIMER 0.61 (H) 06/25/2024    ESRML 59 (H) 10/25/2017    CRP 1.86 (H) 10/25/2017    TROP <0.046 10/01/2017    B12 702 04/15/2021         Imaging:  No results found.      Impression:       ICD-10-CM    1. Laryngopharyngeal reflux (LPR)  K21.9       2. Hypertrophy of nasal turbinates  J34.3       3. Allergic rhinitis due to pollen, unspecified seasonality  J30.1       4. Chronic cough  R05.3           Recommendations:  I would like the patient to start both Flonase and azelastine in combination.  She will do this over the next 2 to 3 weeks.  If she does not feel improved in terms of her cough I would like her to add the pantoprazole once daily.  If this still does not improve her symptoms I would like to have her follow-up with me in 6 to 8 weeks from now and we may consider treatment of laryngeal sensory neuropathy with gabapentin or amitriptyline.    Thank you for allowing me to participate in the care of your patient.    Arash Ken, DO   Otolaryngology/Rhinology, Sinus, and Endoscopic Skull Base Surgery  [facility] [address] [place] [id number]  Phone [phone]  Fax [phone]  9/4/2024  7:49 PM  9/4/2024

## 2024-10-29 ENCOUNTER — OFFICE VISIT (OUTPATIENT)
Facility: LOCATION | Age: 54
End: 2024-10-29
Payer: COMMERCIAL

## 2024-10-29 ENCOUNTER — LAB ENCOUNTER (OUTPATIENT)
Dept: LAB | Age: 54
End: 2024-10-29
Attending: INTERNAL MEDICINE
Payer: COMMERCIAL

## 2024-10-29 VITALS
HEIGHT: 61 IN | SYSTOLIC BLOOD PRESSURE: 130 MMHG | WEIGHT: 249 LBS | HEART RATE: 64 BPM | DIASTOLIC BLOOD PRESSURE: 90 MMHG | BODY MASS INDEX: 47.01 KG/M2

## 2024-10-29 DIAGNOSIS — R73.03 PREDIABETES: ICD-10-CM

## 2024-10-29 DIAGNOSIS — E78.5 HYPERLIPIDEMIA, UNSPECIFIED HYPERLIPIDEMIA TYPE: ICD-10-CM

## 2024-10-29 DIAGNOSIS — E05.90 HYPERTHYROIDISM: ICD-10-CM

## 2024-10-29 DIAGNOSIS — Z90.710 S/P HYSTERECTOMY: ICD-10-CM

## 2024-10-29 DIAGNOSIS — E66.01 MORBID OBESITY (HCC): ICD-10-CM

## 2024-10-29 DIAGNOSIS — I10 PRIMARY HYPERTENSION: ICD-10-CM

## 2024-10-29 DIAGNOSIS — E05.00 GRAVES' ORBITOPATHY: ICD-10-CM

## 2024-10-29 DIAGNOSIS — Z86.711 HISTORY OF PULMONARY EMBOLISM: ICD-10-CM

## 2024-10-29 DIAGNOSIS — E05.00 GRAVES DISEASE: ICD-10-CM

## 2024-10-29 DIAGNOSIS — E05.00 GRAVES DISEASE: Primary | ICD-10-CM

## 2024-10-29 DIAGNOSIS — Z90.49 HISTORY OF CHOLECYSTECTOMY: ICD-10-CM

## 2024-10-29 LAB — TSI SER-ACNC: 0.58 MIU/ML (ref 0.55–4.78)

## 2024-10-29 PROCEDURE — 3080F DIAST BP >= 90 MM HG: CPT | Performed by: INTERNAL MEDICINE

## 2024-10-29 PROCEDURE — 3008F BODY MASS INDEX DOCD: CPT | Performed by: INTERNAL MEDICINE

## 2024-10-29 PROCEDURE — 36415 COLL VENOUS BLD VENIPUNCTURE: CPT

## 2024-10-29 PROCEDURE — 99214 OFFICE O/P EST MOD 30 MIN: CPT | Performed by: INTERNAL MEDICINE

## 2024-10-29 PROCEDURE — 84443 ASSAY THYROID STIM HORMONE: CPT

## 2024-10-29 PROCEDURE — 3075F SYST BP GE 130 - 139MM HG: CPT | Performed by: INTERNAL MEDICINE

## 2024-10-29 NOTE — PATIENT INSTRUCTIONS
Labs today   Labs and RTC in 3 mo   If you get hyperthyroid symptoms, do labs soon and RTC in 1-2 mo   Use eye drops and ointment  PRN

## 2024-10-29 NOTE — PROGRESS NOTES
Reason for Visit:    Graves , dry cough, goiter, HTN  Requesting Physician:   ..Yury Callaway MD    CHIEF COMPLAINT:    Chief Complaint   Patient presents with    Thyroid Problem     F/u        HISTORY OF PRESENT ILLNESS:   Shannon Mann is a 54 year old female who presents with Graves, hyperthroidism , GO,  dry cough, goiter, HTN, low Vit D, and PE after covid. Was on blood thinner till 2/2023    Graves was Dx in 2017. Started MMI higher doses then titrated to MMI 5 mg/day. Got in  remission in 2019  Relapse in 2022. High TSI 10/2022. TSI is normal 8/2024 with negative TSI     held Methimazole 5 mg /day last visit   Denied hyperthyroid symptoms.       Lost wt on Zepbound 7.5 mg/week now  Wt Readings from Last 6 Encounters:   10/29/24 249 lb (112.9 kg)   08/27/24 257 lb (116.6 kg)   07/29/24 260 lb (117.9 kg)   07/11/24 260 lb 9.6 oz (118.2 kg)   06/25/24 260 lb 9.3 oz (118.2 kg)   06/25/24 261 lb (118.4 kg)       No Dry eye and using eye drops PRN .    Had  eye exam   Cough is the same with the med change I did last time. Cough started 2 yrs ago  Saw pulm and ENT   Tried PPI but did not help     Had CTA 6/2024    Menstrual hx: ln 2017, had HYS   Neck surgery: No  Neck radiation: No   Biotin: no  Turmeric:no     Discussed her labs and TSI is normal now       Latest Reference Range & Units 08/23/24 16:47   Thy Stim Immuno 0.00 - 0.55 IU/L 0.44     ASSESSMENT AND PLAN:  54 year old female who presents with Graves, hyperthroidism , GO,  dry cough, goiter, HTN, PE  and low Vit D  For graves's she is in remission now and off MMI. Clinically, euthyroid  Biochemically, will get labs today   Normal TSI in 8/2024. We discussed remission   D/w pt hyperthyroid symptoms again.    Plan  Labs today   Labs and RTC in 3 mo   If you get hyperthyroid symptoms, do labs soon and RTC in 1-2 mo   Use eye drops and ointment  PRN                PAST MEDICAL HISTORY:   Past Medical History:    Graves disease    High blood pressure     History of blood clots    Bllod clotnin lung    Morbid obesity with BMI of 45.0-49.9, adult (HCC)    Obesity    PONV (postoperative nausea and vomiting)    Pulmonary embolism (HCC)    Unspecified essential hypertension    Visual impairment    contacts and glasses       PAST SURGICAL HISTORY:   Past Surgical History:   Procedure Laterality Date          x3    Cholecystectomy  3/2023    Colonoscopy  2017    Endometrial ablation      Hernia surgery      inguinal hernai repair    Hysterectomy      Hysteroscopy,with endometrial  10/01/2006    Oophorectomy      Removal gallbladder      Total abdominal hysterectomy      Tubal ligation  2004       CURRENT MEDICATIONS:     Potassium Chloride ER 10 MEQ Oral Cap CR Take 1 capsule (10 mEq total) by mouth 2 (two) times daily. 90 capsule 1    HYDROCHLOROTHIAZIDE 25 MG Oral Tab TAKE 1 TABLET(25 MG) BY MOUTH DAILY 90 tablet 1    Cholecalciferol (VITAMIN D) 50 MCG (2000 UT) Oral Cap Take by mouth.         ALLERGIES:  Allergies   Allergen Reactions    Neosporin Af [Miconazole Nitrate] RASH     CRREA-blisters    Sulfa Antibiotics OTHER (SEE COMMENTS)    Neomycin RASH    Sulfur-Salicylic Acid [Salicylic Acid-Sulfur] OTHER (SEE COMMENTS)     Cause the skin to bubble.       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  No tremors   Goiter   + eyelid swelling   CONSTITUTIONAL:  Awake and alert. Age appropriate, good hygiene not in acute distress. Well-nourished and well developed. no acute distress   PSYCH:   Orientated to time, place, person & situation, Normal mood and affect, memory intact, normal insight and judgment, cooperative  Neuro: speech is clear. Awake, alert, no aphasia, no facial asymmetry, no nuchal rigidity  EYES:  No proptosis, no ptosis, conjunctiva normal  ENT:  Normocephalic, atraumatic  Eye: EOMI, normal lids, no discharge, no conjunctival erythema. No exophthalmos/proptosis, Ptosis negative   No rhinorrhea, moist oral mucosa  Neck: full range of motion        DATA:     Pertinent data reviewed       Latest Reference Range & Units 08/23/24 16:47   Thy Stim Immuno 0.00 - 0.55 IU/L 0.44      06/29/24 14:40   US THYROID (CPT=76536)  Thyromegaly.  No discrete nodules identified.        Rpt: View report in Results Review for more information   06/25/24 16:36   CT ANGIOGRAPHY, CHEST (CPT=71275) Rpt      Latest Reference Range & Units 10/25/22 07:34 10/25/22 07:35 10/27/22 10:19 12/10/22 07:35 01/24/23 16:01   T4,Free (Direct) 0.8 - 1.7 ng/dL 6.5 (H)  4.5 (H) 0.5 (L) 0.2 (L)   TSH 0.358 - 3.740 mIU/mL <0.005 (L)  <0.005 (L) <0.005 (L) 25.300 (H)   T3 TOTAL 60 - 181 ng/dL  363 (H)      T3 FREE 2.40 - 4.20 pg/mL     1.53 (L)   THYROID STIMULATING IMMUNOGLOB <=0.54 IU/L 7.06 (H)             No results for input(s): \"TSH\", \"T4F\", \"T3F\", \"THYP\" in the last 72 hours.  No results found.    Orders Placed This Encounter   Procedures    TSH W Reflex To Free T4    TSH W Reflex To Free T4     Orders Placed This Encounter    TSH W Reflex To Free T4     Standing Status:    Future     Number of Occurrences:   1     Standing Expiration Date:   10/29/2025    TSH W Reflex To Free T4     Standing Status:   Future     Standing Expiration Date:   10/29/2025          This is a specialized patient consultation in endocrinology and required comprehensive review of prior records, as well as current evaluation, with time required for consideration of complex endocrine issues and consultation. For this visit, I personally interviewed the patient, and family member if accompanied, performed the pertinent parts of the history and physical examination. ROS included screening for appropriate endocrine conditions.   Today's diagnosis and plan were reviewed in detail with the patient who states understanding and agrees with plan. I discussed with the patient possible diagnosis, differential diagnosis, need for work up, treatment options, alternatives and side effects.     Please see note for details about time spent which includes:   · pre-visit preparation  · reviewing records  · face to face time with the patient   · timely documentation of the encounter  · ordering medications/tests  · communication with care team  · care coordination    I appreciate the opportunity to be part of your patient's medical care and will keep you, as the referring and primary physicians, informed about the care of your patient. Please feel free to contact me should you have any questions.    The 21st Century Cures Act makes medical notes like these available to patients in the interest of transparency. Please be advised this is a medical document. Medical documents are intended to carry relevant information, facts as evident, and the clinical opinion of the practitioner. The medical note is intended as peer to peer communication and may appear blunt or direct. It is written in medical language and may contain abbreviations or verbiage that are unfamiliar.   Sandra Madison MD

## 2024-11-02 ENCOUNTER — HOSPITAL ENCOUNTER (EMERGENCY)
Facility: HOSPITAL | Age: 54
Discharge: HOME | End: 2024-11-02
Attending: EMERGENCY MEDICINE
Payer: COMMERCIAL

## 2024-11-02 VITALS
DIASTOLIC BLOOD PRESSURE: 96 MMHG | BODY MASS INDEX: 45.31 KG/M2 | RESPIRATION RATE: 16 BRPM | HEIGHT: 61 IN | SYSTOLIC BLOOD PRESSURE: 147 MMHG | HEART RATE: 75 BPM | TEMPERATURE: 98 F | WEIGHT: 240 LBS | OXYGEN SATURATION: 97 %

## 2024-11-02 DIAGNOSIS — K52.9 GASTROENTERITIS: Primary | ICD-10-CM

## 2024-11-02 LAB
ALBUMIN SERPL-MCNC: 4.4 G/DL (ref 3.2–4.8)
ALBUMIN/GLOB SERPL: 1.1 {RATIO} (ref 1–2)
ALP LIVER SERPL-CCNC: 74 U/L
ALT SERPL-CCNC: <7 U/L
ANION GAP SERPL CALC-SCNC: 6 MMOL/L (ref 0–18)
AST SERPL-CCNC: 17 U/L (ref ?–34)
BASOPHILS # BLD AUTO: 0.01 X10(3) UL (ref 0–0.2)
BASOPHILS NFR BLD AUTO: 0.1 %
BILIRUB SERPL-MCNC: 0.7 MG/DL (ref 0.3–1.2)
BILIRUB UR QL STRIP.AUTO: NEGATIVE
BUN BLD-MCNC: 11 MG/DL (ref 9–23)
CALCIUM BLD-MCNC: 10.5 MG/DL (ref 8.7–10.4)
CHLORIDE SERPL-SCNC: 99 MMOL/L (ref 98–112)
CLARITY UR REFRACT.AUTO: CLEAR
CO2 SERPL-SCNC: 31 MMOL/L (ref 21–32)
CREAT BLD-MCNC: 0.77 MG/DL
EGFRCR SERPLBLD CKD-EPI 2021: 93 ML/MIN/1.73M2 (ref 60–?)
EOSINOPHIL # BLD AUTO: 0.25 X10(3) UL (ref 0–0.7)
EOSINOPHIL NFR BLD AUTO: 3.1 %
ERYTHROCYTE [DISTWIDTH] IN BLOOD BY AUTOMATED COUNT: 12.1 %
GLOBULIN PLAS-MCNC: 4 G/DL (ref 2–3.5)
GLUCOSE BLD-MCNC: 128 MG/DL (ref 70–99)
GLUCOSE UR STRIP.AUTO-MCNC: NORMAL MG/DL
HCT VFR BLD AUTO: 40.7 %
HGB BLD-MCNC: 14.3 G/DL
IMM GRANULOCYTES # BLD AUTO: 0.03 X10(3) UL (ref 0–1)
IMM GRANULOCYTES NFR BLD: 0.4 %
LEUKOCYTE ESTERASE UR QL STRIP.AUTO: NEGATIVE
LIPASE SERPL-CCNC: 36 U/L (ref 12–53)
LYMPHOCYTES # BLD AUTO: 1.1 X10(3) UL (ref 1–4)
LYMPHOCYTES NFR BLD AUTO: 13.6 %
MCH RBC QN AUTO: 29.1 PG (ref 26–34)
MCHC RBC AUTO-ENTMCNC: 35.1 G/DL (ref 31–37)
MCV RBC AUTO: 82.9 FL
MONOCYTES # BLD AUTO: 0.48 X10(3) UL (ref 0.1–1)
MONOCYTES NFR BLD AUTO: 5.9 %
NEUTROPHILS # BLD AUTO: 6.23 X10 (3) UL (ref 1.5–7.7)
NEUTROPHILS # BLD AUTO: 6.23 X10(3) UL (ref 1.5–7.7)
NEUTROPHILS NFR BLD AUTO: 76.9 %
NITRITE UR QL STRIP.AUTO: NEGATIVE
OSMOLALITY SERPL CALC.SUM OF ELEC: 283 MOSM/KG (ref 275–295)
PH UR STRIP.AUTO: 6 [PH] (ref 5–8)
PLATELET # BLD AUTO: 298 10(3)UL (ref 150–450)
POTASSIUM SERPL-SCNC: 3 MMOL/L (ref 3.5–5.1)
PROT SERPL-MCNC: 8.4 G/DL (ref 5.7–8.2)
PROT UR STRIP.AUTO-MCNC: 20 MG/DL
RBC # BLD AUTO: 4.91 X10(6)UL
RBC UR QL AUTO: NEGATIVE
SODIUM SERPL-SCNC: 136 MMOL/L (ref 136–145)
SP GR UR STRIP.AUTO: 1.02 (ref 1–1.03)
UROBILINOGEN UR STRIP.AUTO-MCNC: NORMAL MG/DL
WBC # BLD AUTO: 8.1 X10(3) UL (ref 4–11)

## 2024-11-02 PROCEDURE — 99284 EMERGENCY DEPT VISIT MOD MDM: CPT

## 2024-11-02 PROCEDURE — 83690 ASSAY OF LIPASE: CPT | Performed by: EMERGENCY MEDICINE

## 2024-11-02 PROCEDURE — 85025 COMPLETE CBC W/AUTO DIFF WBC: CPT | Performed by: EMERGENCY MEDICINE

## 2024-11-02 PROCEDURE — 80053 COMPREHEN METABOLIC PANEL: CPT | Performed by: EMERGENCY MEDICINE

## 2024-11-02 PROCEDURE — 96375 TX/PRO/DX INJ NEW DRUG ADDON: CPT

## 2024-11-02 PROCEDURE — 96361 HYDRATE IV INFUSION ADD-ON: CPT

## 2024-11-02 PROCEDURE — 96374 THER/PROPH/DIAG INJ IV PUSH: CPT

## 2024-11-02 PROCEDURE — 81001 URINALYSIS AUTO W/SCOPE: CPT | Performed by: EMERGENCY MEDICINE

## 2024-11-02 RX ORDER — METOCLOPRAMIDE 10 MG/1
10 TABLET ORAL 3 TIMES DAILY PRN
Qty: 20 TABLET | Refills: 0 | Status: SHIPPED | OUTPATIENT
Start: 2024-11-02 | End: 2024-12-02

## 2024-11-02 RX ORDER — ONDANSETRON 2 MG/ML
4 INJECTION INTRAMUSCULAR; INTRAVENOUS ONCE
Status: COMPLETED | OUTPATIENT
Start: 2024-11-02 | End: 2024-11-02

## 2024-11-02 RX ORDER — ONDANSETRON 4 MG/1
4 TABLET, ORALLY DISINTEGRATING ORAL EVERY 6 HOURS PRN
Qty: 15 TABLET | Refills: 0 | Status: SHIPPED | OUTPATIENT
Start: 2024-11-02 | End: 2024-11-09

## 2024-11-02 RX ORDER — DIPHENHYDRAMINE HYDROCHLORIDE 50 MG/ML
25 INJECTION INTRAMUSCULAR; INTRAVENOUS ONCE
Status: COMPLETED | OUTPATIENT
Start: 2024-11-02 | End: 2024-11-02

## 2024-11-02 RX ORDER — POTASSIUM CHLORIDE 1.5 G/1.58G
40 POWDER, FOR SOLUTION ORAL ONCE
Status: COMPLETED | OUTPATIENT
Start: 2024-11-02 | End: 2024-11-02

## 2024-11-02 RX ORDER — METOCLOPRAMIDE HYDROCHLORIDE 5 MG/ML
10 INJECTION INTRAMUSCULAR; INTRAVENOUS ONCE
Status: COMPLETED | OUTPATIENT
Start: 2024-11-02 | End: 2024-11-02

## 2024-11-03 NOTE — DISCHARGE INSTRUCTIONS
Use Imodium if needed for diarrhea.    Use Zofran and/or Reglan for nausea.  You may alternate each 1 every 3-4 hours as needed.    Expect improvement over the next 24 to 48 hours.  If symptoms persist, be reevaluated with your PCP.  If symptoms worsen over the weekend return here

## 2024-11-03 NOTE — ED INITIAL ASSESSMENT (HPI)
Patient complains of vomiting and diarrhea for approx. 2 days. Patient states she has had approx. 4 episodes of vomiting and \"too many to count\" episodes of diarrhea. Patient complains of RLQ pain.

## 2024-11-03 NOTE — ED PROVIDER NOTES
Patient Seen in: [facility] Emergency Department      History     Chief Complaint   Patient presents with    Nausea/Vomiting/Diarrhea     Stated Complaint: c/o n/v/d for 2 days, no blood in stools or emesis    Subjective:   HPI      54-year-old female presents for evaluation of of vomiting and diarrhea for the past 2 days.  Patient thinks she may have eaten a old tuna steak the night before symptoms started.  Has some abdominal pain.  Vomiting is nonbilious and diarrhea is nonbloody.  No other persons or sick at home.  No new cough or cold.  No fever or shaking chills.    Objective:     Past Medical History:    Graves disease    High blood pressure    History of blood clots    Bllod clotnin lung    Morbid obesity with BMI of 45.0-49.9, adult (HCC)    Obesity    PONV (postoperative nausea and vomiting)    Pulmonary embolism (HCC)    Unspecified essential hypertension    Visual impairment    contacts and glasses              Past Surgical History:   Procedure Laterality Date          x3    Cholecystectomy  3/2023    Colonoscopy  2017    Endometrial ablation      Hernia surgery      inguinal hernai repair    Hysterectomy      Hysteroscopy,with endometrial  10/01/2006    Oophorectomy      Removal gallbladder      Total abdominal hysterectomy      Tubal ligation  2004                Social History     Socioeconomic History    Marital status:    Tobacco Use    Smoking status: Never     Passive exposure: Past    Smokeless tobacco: Never   Vaping Use    Vaping status: Never Used   Substance and Sexual Activity    Alcohol use: Not Currently     Alcohol/week: 1.0 standard drink of alcohol    Drug use: No    Sexual activity: Yes   Other Topics Concern    Exercise No     Social Drivers of Health      Received from [employer], [employer]    [facility]                  Physical Exam     ED Triage Vitals [24 1919]   BP (!) 163/105   Pulse 82   Resp 20   Temp 97.9 °F (36.6 °C)   Temp src  Temporal   SpO2 96 %   O2 Device None (Room air)       Current Vitals:   Vital Signs  BP: (!) 154/91  Pulse: 76  Resp: 16  Temp: 97.9 °F (36.6 °C)  Temp src: Temporal  MAP (mmHg): (!) 108    Oxygen Therapy  SpO2: 97 %  O2 Device: None (Room air)        Physical Exam  General: Alert, oriented, no apparent distress  HEENT:  Pupils equal reactive.  Extraocular motions intact. MMM.  Neck: Supple  Lungs: Clear to auscultation bilaterally.  Heart: Regular rate and rhythm.  Abdomen: Soft, nontender.   Skin: No rash.  No edema.  Neurologic: No focal neurologic deficits.  Normal speech pattern  Musculoskeletal: No tenderness or deformity noted.  Full range of motion throughout.        ED Course     Labs Reviewed   COMP METABOLIC PANEL (14) - Abnormal; Notable for the following components:       Result Value    Glucose 128 (*)     Potassium 3.0 (*)     Calcium, Total 10.5 (*)     ALT <7 (*)     Total Protein 8.4 (*)     Globulin  4.0 (*)     All other components within normal limits   URINALYSIS WITH CULTURE REFLEX - Abnormal; Notable for the following components:    Ketones Urine Trace (*)     Protein Urine 20 (*)     All other components within normal limits   LIPASE - Normal   CBC WITH DIFFERENTIAL WITH PLATELET   GI STOOL PANEL BY PCR                   MDM      Differential diagnosis includes viral gastroenteritis, food poisoning, dehydration, metabolic disturbance      Medications   sodium chloride 0.9 % IV bolus 1,000 mL (1,000 mL Intravenous New Bag 11/2/24 2112)   ondansetron (Zofran) 4 MG/2ML injection 4 mg (4 mg Intravenous Given 11/2/24 1953)   sodium chloride 0.9 % IV bolus 1,000 mL (1,000 mL Intravenous New Bag 11/2/24 1948)   potassium chloride (Klor-Con) 20 MEQ oral powder 40 mEq (40 mEq Oral Given 11/2/24 2037)   metoclopramide (Reglan) 5 mg/mL injection 10 mg (10 mg Intravenous Given 11/2/24 2109)   diphenhydrAMINE (Benadryl) 50 mg/mL  injection 25 mg (25 mg Intravenous Given 11/2/24 2109)     Chemistry with  slightly low potassium that was repleted.  Normal kidney function.  Lipase normal.  CBC normal.  Urine normal    2130: Abdomen reexamined and remains soft and nontender.  Patient tolerated oral potassium    Patient unable to provide a stool sample while in the ER.  It seems some of the diarrhea symptoms have improved.  Patient will be discharged with a prescription for Zofran and Reglan.  She can alternate every 3-4 hours.  I suspect this is most likely food poisoning, possibly still a viral    Medical Decision Making      Disposition and Plan     Clinical Impression:  1. Gastroenteritis         Disposition:  Discharge  11/2/2024  9:52 pm    Follow-up:  Yury Callaway MD  [facility] [address]  [phone]    Call in 2 day(s)  As needed          Medications Prescribed:  Current Discharge Medication List        START taking these medications    Details   ondansetron 4 MG Oral Tablet Dispersible Take 1 tablet (4 mg total) by mouth every 6 (six) hours as needed for Nausea.  Qty: 15 tablet, Refills: 0      metoclopramide 10 MG Oral Tab Take 1 tablet (10 mg total) by mouth 3 (three) times daily as needed.  Qty: 20 tablet, Refills: 0                 Supplementary Documentation:

## 2024-11-15 NOTE — PROGRESS NOTES
Shannon Mann is a 54 year old female.    Chief Complaint   Patient presents with    Hospital F/U     Pt reports for ER f/u after two weeks ago food poisoning, and last weekend acute abdominal pain.       HPI:   Here for ER follow up   Seen in ER at [facility] 11/2 for NVD prescribed zofran.  Treated as gastroenteritis.  Suspected food poisoning.   dischaged to home.   Returned to [facility] ER 11/14 for intense abd pain.    CT  small hiatal hernia and slow transit    Zofran, IV   Mild elevation of lipase.  269 - pancreatitis.    Mild hypokalemia 3.4 (on kdur 10meq bid)    Constipation. Likely drug induced.  ?zofran or reglan +/- zepbound.    Miralax is helping now.  She will back off Miralax as today she had diarrhea..      Zepbound  prescribed by Brian.  I have asked her to stop zepbound and likely all GLP1s.       HTN  bp is stable  hydrochlorothiazide for endo and bp.  Known hypokalemia and replaced with 10meq bid     Graves.  [facility].  Thyroid has been stable      Patient Active Problem List   Diagnosis    Primary hypertension    S/P hysterectomy    Morbid obesity (HCC)    Hyperthyroidism    Graves disease    Prediabetes    Urinary incontinence    Primary osteoarthritis of left knee    Hyperlipidemia    History of pulmonary embolism    History of cholecystectomy     Current Outpatient Medications   Medication Sig Dispense Refill    Polyethylene Glycol 3350 17 g Oral Powd Pack Take 17 g by mouth daily.      pantoprazole 40 MG Oral Tab EC Take 1 tablet (40 mg total) by mouth every morning before breakfast. 90 tablet 0    Potassium Chloride ER 10 MEQ Oral Cap CR Take 1 capsule (10 mEq total) by mouth 2 (two) times daily. 90 capsule 1    HYDROCHLOROTHIAZIDE 25 MG Oral Tab TAKE 1 TABLET(25 MG) BY MOUTH DAILY 90 tablet 1    Cholecalciferol (VITAMIN D) 50 MCG (2000 UT) Oral Cap Take by mouth.      ZEPBOUND 2.5 MG/0.5ML Subcutaneous Solution Auto-injector ADMINISTER 2.5 MG UNDER THE SKIN 1 TIME WEEKLY FOR 4 WEEKS        Past  Medical History:    Graves disease    High blood pressure    History of blood clots    Bllod clotnin lung    Morbid obesity with BMI of 45.0-49.9, adult (HCC)    Obesity    PONV (postoperative nausea and vomiting)    Pulmonary embolism (HCC)    Unspecified essential hypertension    Visual impairment    contacts and glasses      Social History:  Social History     Socioeconomic History    Marital status:    Tobacco Use    Smoking status: Never     Passive exposure: Past    Smokeless tobacco: Never   Vaping Use    Vaping status: Never Used   Substance and Sexual Activity    Alcohol use: Not Currently     Alcohol/week: 1.0 standard drink of alcohol    Drug use: No    Sexual activity: Yes   Other Topics Concern    Exercise No     Social Drivers of Health      Received from [employer]    [employer]     Family History   Problem Relation Age of Onset    Other (CAD) Father     Other (HTN) Maternal Grandfather     Other (HTN) Mother     Cancer Mother     Hypertension Mother         Allergies  Allergies[1]    REVIEW OF SYSTEMS:   GENERAL HEALTH: feels better  RESPIRATORY: denies shortness of breath with exertion, no cough  CARDIOVASCULAR: denies chest pain on exertion, no palpatations  GI: as above   EXAM:   /82   Temp (!) 79 °F (26.1 °C)   Resp 18   Ht 5' 1\" (1.549 m)   Wt 244 lb (110.7 kg)   LMP 01/23/2017 (LMP Unknown)   SpO2 95%   BMI 46.10 kg/m²   GENERAL: well developed, well nourished,in no apparent distress  LUNGS: normal rate without respiratory distress, lungs clear to auscultation  CARDIO: RRR without murmur  GI: normal bowel sounds, no masses, HSM or tenderness  soft  EXTREMITIES: no edema, normal strength and tone  PSYCH: alert and oriented x 3    ASSESSMENT AND PLAN:     Encounter Diagnoses   Name Primary?    Generalized abdominal pain  improving.  [place] diet.   Yes    Acute pancreatitis, unspecified complication status, unspecified pancreatitis type (HCC)  stop all GLP 1 at  this time.       Drug-induced constipation  improved with miralax.       Viral gastroenteritis  resolve.d      Hyperlipidemia, unspecified hyperlipidemia type  check lipid     Prediabetes  check A1c  NP at [facility] has also requested.      Hypokalemia  cont kdur 10meq bid.  Check labs with CPE.      Hyperthyroidism  per endo.      Primary hypertension  stable       Screening for diabetes mellitus     Screening, lipid        Orders Placed This Encounter   Procedures    Comp Metabolic Panel    Lipid Panel    Hemoglobin A1C       Meds & Refills for this Visit:  Requested Prescriptions      No prescriptions requested or ordered in this encounter       Imaging & Consults:  None    No follow-ups on file.  There are no Patient Instructions on file for this visit.         [1]   Allergies  Allergen Reactions    Neosporin Af [Miconazole Nitrate] RASH     CRREA-blisters    Sulfa Antibiotics OTHER (SEE COMMENTS)    Neomycin RASH    Sulfur-Salicylic Acid [Salicylic Acid-Sulfur] OTHER (SEE COMMENTS)     Cause the skin to bubble.

## 2024-11-18 ENCOUNTER — OFFICE VISIT (OUTPATIENT)
Dept: INTERNAL MEDICINE CLINIC | Facility: CLINIC | Age: 54
End: 2024-11-18
Payer: COMMERCIAL

## 2024-11-18 VITALS
SYSTOLIC BLOOD PRESSURE: 122 MMHG | WEIGHT: 244 LBS | TEMPERATURE: 79 F | DIASTOLIC BLOOD PRESSURE: 82 MMHG | OXYGEN SATURATION: 95 % | HEIGHT: 61 IN | RESPIRATION RATE: 18 BRPM | BODY MASS INDEX: 46.07 KG/M2

## 2024-11-18 DIAGNOSIS — K59.03 DRUG-INDUCED CONSTIPATION: ICD-10-CM

## 2024-11-18 DIAGNOSIS — Z13.1 SCREENING FOR DIABETES MELLITUS: ICD-10-CM

## 2024-11-18 DIAGNOSIS — K85.90 ACUTE PANCREATITIS, UNSPECIFIED COMPLICATION STATUS, UNSPECIFIED PANCREATITIS TYPE (HCC): ICD-10-CM

## 2024-11-18 DIAGNOSIS — E05.90 HYPERTHYROIDISM: ICD-10-CM

## 2024-11-18 DIAGNOSIS — I10 PRIMARY HYPERTENSION: ICD-10-CM

## 2024-11-18 DIAGNOSIS — A08.4 VIRAL GASTROENTERITIS: ICD-10-CM

## 2024-11-18 DIAGNOSIS — R10.84 GENERALIZED ABDOMINAL PAIN: Primary | ICD-10-CM

## 2024-11-18 DIAGNOSIS — E78.5 HYPERLIPIDEMIA, UNSPECIFIED HYPERLIPIDEMIA TYPE: ICD-10-CM

## 2024-11-18 DIAGNOSIS — E87.6 HYPOKALEMIA: ICD-10-CM

## 2024-11-18 DIAGNOSIS — Z13.220 SCREENING, LIPID: ICD-10-CM

## 2024-11-18 DIAGNOSIS — R73.03 PREDIABETES: ICD-10-CM

## 2024-11-18 PROCEDURE — G2211 COMPLEX E/M VISIT ADD ON: HCPCS | Performed by: NURSE PRACTITIONER

## 2024-11-18 PROCEDURE — 99214 OFFICE O/P EST MOD 30 MIN: CPT | Performed by: NURSE PRACTITIONER

## 2024-11-18 PROCEDURE — 3074F SYST BP LT 130 MM HG: CPT | Performed by: NURSE PRACTITIONER

## 2024-11-18 PROCEDURE — 3079F DIAST BP 80-89 MM HG: CPT | Performed by: NURSE PRACTITIONER

## 2024-11-18 PROCEDURE — 3008F BODY MASS INDEX DOCD: CPT | Performed by: NURSE PRACTITIONER

## 2024-11-18 RX ORDER — POLYETHYLENE GLYCOL 3350 17 G/17G
17 POWDER, FOR SOLUTION ORAL DAILY
COMMUNITY
Start: 2024-11-14

## 2024-12-29 NOTE — PROGRESS NOTES
Shannon Mann is a 54 year old female who presents for a complete physical exam:       Patient complains of:     Hyperthyroid  managed by Endo. Her Graves is back.  Wants to start methimazole but elevated LFT.  Plan if LFTs downward trend, can start meds.      GERD  protonix.      Prediabets.  A1c 5.8  was owrking with Noom and GLP1 but pancreatitis in Nov.  Has stopped.      HTN  hydrochlorothiazide for bp and Endo.  Hx hypokalemia in Nov.  Electrolytes stable      New acute elevation of LFTs.  See result note.   she was ill 12/24-12/28 with diarrhea/viral etiology.  No new meds or supplements.  Will check today.  If improving will follow to resolution.  If persistent, will message and refer to [facility], Dr Bello.      Cough persistent  at one point was improved with PPI but not recently  some wheezing.  Was on inhlaer when she was pregnant but not since  not feeling ill  no increased SOB with exertion.  Discussed options.  Avoid oral steroids.  Will use advair as this is what she was on in the past with good response.   If persistet, will need to consider PFTs.     Wt Readings from Last 6 Encounters:   01/02/25 246 lb 3.2 oz (111.7 kg)   12/31/24 246 lb (111.6 kg)   11/18/24 244 lb (110.7 kg)   11/02/24 240 lb (108.9 kg)   10/29/24 249 lb (112.9 kg)   08/27/24 257 lb (116.6 kg)       Cholesterol, Total (mg/dL)   Date Value   12/30/2024 165   10/14/2023 198   10/23/2022 129   10/07/2020 197     CHOLESTEROL, TOTAL (mg/dL)   Date Value   12/06/2016 172   11/14/2015 166   07/28/2014 175     HDL Cholesterol (mg/dL)   Date Value   12/30/2024 45   10/14/2023 86 (H)   10/23/2022 55   10/07/2020 55     HDL CHOLESTEROL (mg/dL)   Date Value   12/06/2016 53   11/14/2015 49   07/28/2014 53     LDL Cholesterol (mg/dL)   Date Value   12/30/2024 105 (H)   10/14/2023 105 (H)   10/23/2022 54     LDL-CHOLESTEROL (mg/dL (calc))   Date Value   12/06/2016 104   11/14/2015 103   07/28/2014 110     AST (U/L)   Date Value   12/30/2024 143 (H)    2024 17   2024 16   2020 17   2016 15   2015 13   2014 15     ALT (U/L)   Date Value   2024 148 (H)   2024 <7 (L)   2024 <7 (L)   2020 10 (L)   2016 5 (L)   2015 4 (L)   2014 5 (L)        Current Outpatient Medications   Medication Sig Dispense Refill    fluticasone-salmeterol 115-21 MCG/ACT Inhalation Aerosol Inhale 1 puff into the lungs 2 (two) times daily. 1 each 3    pantoprazole 40 MG Oral Tab EC Take 1 tablet (40 mg total) by mouth every morning before breakfast. 90 tablet 3    Potassium Chloride ER 10 MEQ Oral Cap CR Take 1 capsule (10 mEq total) by mouth 2 (two) times daily. 90 capsule 1    HYDROCHLOROTHIAZIDE 25 MG Oral Tab TAKE 1 TABLET(25 MG) BY MOUTH DAILY 90 tablet 1    Cholecalciferol (VITAMIN D) 50 MCG (2000 UT) Oral Cap Take by mouth.      METHIMAZOLE 5 MG Oral Tab TAKE 1 TABLET(5 MG) BY MOUTH THREE TIMES DAILY (Patient not taking: Reported on 2025) 270 tablet 0      Past Medical History:    Graves disease    High blood pressure    History of blood clots    Bllod clotnin lung    Morbid obesity with BMI of 45.0-49.9, adult (HCC)    Obesity    PONV (postoperative nausea and vomiting)    Pulmonary embolism (HCC)    Unspecified essential hypertension    Visual impairment    contacts and glasses      Past Surgical History:   Procedure Laterality Date          x3    Cholecystectomy  3/2023    Colonoscopy  2017    Endometrial ablation      Hernia surgery      inguinal hernai repair    Hysterectomy      Hysteroscopy,with endometrial  10/01/2006    Oophorectomy      Removal gallbladder      Total abdominal hysterectomy      Tubal ligation  2004      Family History   Problem Relation Age of Onset    Other (CAD) Father     Other (HTN) Maternal Grandfather     Other (HTN) Mother     Cancer Mother     Hypertension Mother            Health Maintenance  Immunizations:   Immunization History   Administered Date(s)  Administered    Covid-19 Vaccine Moderna 100 mcg/0.5 ml 02/01/2021, 02/25/2021, 12/18/2021    Influenza 05/09/2005, 07/09/2005, 06/07/2006, 07/23/2008    Meningococcal Vaccine 11/10/2023    Meningococcal-Menveo 10-55 YEARS 11/10/2023    TDAP 11/15/2023   Deferred Date(s) Deferred    FLUZONE 6 months and older PFS 0.5 ml (22546) 12/08/2016     Dental Visits: yes   Colon cancer screening:    Health Maintenance   Topic Date Due    Colorectal Cancer Screening  03/30/2027      Gyne:   No recommendations at this time   Hyst.          Breast Cancer Screening:    Health Maintenance   Topic Date Due    Mammogram  06/28/2025        Bone Density:  na but will consider sooner with hx hyperthyroid.      Osteoporosis Prevention:    Osteoporosis Prevention was discussed.  Reviewed Calcium, Vitamin D supplementation and Weight Bearing Exercises.    Patient is performing weight bearing exercises.  Patient is currently taking Vitamin D supplementation.        REVIEW OF SYSTEMS:   GENERAL: feels well otherwise  as above   SKIN: denies any unusual skin lesions  EYES:denies blurred vision or double vision  HEENT: denies nasal congestion, sinus pain or ST  LUNGS: denies shortness of breath with exertion  cough  CARDIOVASCULAR: denies chest pain on exertion  GI: denies abdominal pain,denies heartburn  : denies dysuria, vaginal discharge or itching  MUSCULOSKELETAL: denies back pain  NEURO: denies headaches  PSYCH: no c/o      EXAM:   /88 (BP Location: Right arm, Patient Position: Sitting, Cuff Size: adult)   Pulse 81   Ht 5' 2.52\" (1.588 m)   Wt 246 lb 3.2 oz (111.7 kg)   LMP 01/23/2017 (LMP Unknown)   SpO2 97%   BMI 44.28 kg/m²   Body mass index is 44.28 kg/m².   GENERAL: well developed, well nourished,in no apparent distress  SKIN: no rashes,no suspicious lesions  HEENT: atraumatic, normocephalic,ears and throat are clear  EYES:PERRLA, EOMI, conjunctiva are clear  NECK: supple,no adenopathy,  CHEST: no chest  tenderness  LUNGS: clear to auscultation  slight bilateral end expiratory wheezing.   CARDIO: RRR without murmur  GI: good BS's,no masses, HSM or tenderness  MUSCULOSKELETAL: back is not tender,  EXTREMITIES: no edema  NEURO: Oriented times three,cranial nerves are intact,motor and sensory are grossly intact    ASSESSMENT AND PLAN:      HEALTH MAINTENANCE:  labs reviewed.      Encounter Diagnoses   Name Primary?    Routine general medical examination at a health care facility Yes    Graves disease  per Endo   needs to restart methimazole if LFTs are improving.       Hyperthyroidism  per Endo  as above.      Hyperlipidemia, unspecified hyperlipidemia type  stable       Prediabetes  stable moniotr.      Primary hypertension  stable  hydrochlorothiazide 25mg.      Primary osteoarthritis of left knee  stable  moniotr.      History of pulmonary embolism  stable  monitor.      History of pancreatitis  stable  avoid GLP1     Encounter for screening mammogram for malignant neoplasm of breast     Elevated LFTs  labs today.  If improving will continue to follow  if persistent or worsening, will message and discuss with Dr Bello.       History of cholecystectomy     S/P hysterectomy        Orders Placed This Encounter   Procedures    Comp Metabolic Panel (14) [E]       Meds & Refills for this Visit:  Requested Prescriptions     Signed Prescriptions Disp Refills    fluticasone-salmeterol 115-21 MCG/ACT Inhalation Aerosol 1 each 3     Sig: Inhale 1 puff into the lungs 2 (two) times daily.    pantoprazole 40 MG Oral Tab EC 90 tablet 3     Sig: Take 1 tablet (40 mg total) by mouth every morning before breakfast.       Imaging & Consults:  [facility] KIM 2D+3D SCREENING BILAT (CPT=77067/02494)    No follow-ups on file.  There are no Patient Instructions on file for this visit.

## 2024-12-30 ENCOUNTER — TELEPHONE (OUTPATIENT)
Dept: ENDOCRINOLOGY CLINIC | Facility: CLINIC | Age: 54
End: 2024-12-30

## 2024-12-30 ENCOUNTER — LAB ENCOUNTER (OUTPATIENT)
Dept: LAB | Age: 54
End: 2024-12-30
Attending: NURSE PRACTITIONER
Payer: COMMERCIAL

## 2024-12-30 DIAGNOSIS — Z13.1 SCREENING FOR DIABETES MELLITUS: ICD-10-CM

## 2024-12-30 DIAGNOSIS — E66.01 MORBID OBESITY (HCC): ICD-10-CM

## 2024-12-30 DIAGNOSIS — I10 PRIMARY HYPERTENSION: ICD-10-CM

## 2024-12-30 DIAGNOSIS — Z13.220 SCREENING, LIPID: ICD-10-CM

## 2024-12-30 DIAGNOSIS — E05.00 GRAVES' ORBITOPATHY: ICD-10-CM

## 2024-12-30 DIAGNOSIS — E87.6 HYPOKALEMIA: ICD-10-CM

## 2024-12-30 DIAGNOSIS — E05.00 GRAVES DISEASE: ICD-10-CM

## 2024-12-30 DIAGNOSIS — Z90.49 HISTORY OF CHOLECYSTECTOMY: ICD-10-CM

## 2024-12-30 DIAGNOSIS — E78.5 HYPERLIPIDEMIA, UNSPECIFIED HYPERLIPIDEMIA TYPE: ICD-10-CM

## 2024-12-30 DIAGNOSIS — E05.90 HYPERTHYROIDISM: ICD-10-CM

## 2024-12-30 DIAGNOSIS — R73.03 PREDIABETES: ICD-10-CM

## 2024-12-30 DIAGNOSIS — Z86.711 HISTORY OF PULMONARY EMBOLISM: ICD-10-CM

## 2024-12-30 DIAGNOSIS — Z90.710 S/P HYSTERECTOMY: ICD-10-CM

## 2024-12-30 LAB
ALBUMIN SERPL-MCNC: 4 G/DL (ref 3.2–4.8)
ALBUMIN/GLOB SERPL: 1.2 {RATIO} (ref 1–2)
ALP LIVER SERPL-CCNC: 195 U/L
ALT SERPL-CCNC: 148 U/L
ANION GAP SERPL CALC-SCNC: 5 MMOL/L (ref 0–18)
AST SERPL-CCNC: 143 U/L (ref ?–34)
BILIRUB SERPL-MCNC: 0.7 MG/DL (ref 0.3–1.2)
BUN BLD-MCNC: 15 MG/DL (ref 9–23)
CALCIUM BLD-MCNC: 10 MG/DL (ref 8.7–10.4)
CHLORIDE SERPL-SCNC: 104 MMOL/L (ref 98–112)
CHOLEST SERPL-MCNC: 165 MG/DL (ref ?–200)
CO2 SERPL-SCNC: 32 MMOL/L (ref 21–32)
CREAT BLD-MCNC: 0.66 MG/DL
EGFRCR SERPLBLD CKD-EPI 2021: 105 ML/MIN/1.73M2 (ref 60–?)
EST. AVERAGE GLUCOSE BLD GHB EST-MCNC: 120 MG/DL (ref 68–126)
FASTING PATIENT LIPID ANSWER: YES
FASTING STATUS PATIENT QL REPORTED: YES
GLOBULIN PLAS-MCNC: 3.4 G/DL (ref 2–3.5)
GLUCOSE BLD-MCNC: 104 MG/DL (ref 70–99)
HBA1C MFR BLD: 5.8 % (ref ?–5.7)
HDLC SERPL-MCNC: 45 MG/DL (ref 40–59)
LDLC SERPL CALC-MCNC: 105 MG/DL (ref ?–100)
NONHDLC SERPL-MCNC: 120 MG/DL (ref ?–130)
OSMOLALITY SERPL CALC.SUM OF ELEC: 293 MOSM/KG (ref 275–295)
POTASSIUM SERPL-SCNC: 3.9 MMOL/L (ref 3.5–5.1)
PROT SERPL-MCNC: 7.4 G/DL (ref 5.7–8.2)
SODIUM SERPL-SCNC: 141 MMOL/L (ref 136–145)
T4 FREE SERPL-MCNC: 2.9 NG/DL (ref 0.8–1.7)
TRIGL SERPL-MCNC: 81 MG/DL (ref 30–149)
TSI SER-ACNC: 0.01 UIU/ML (ref 0.55–4.78)
VLDLC SERPL CALC-MCNC: 14 MG/DL (ref 0–30)

## 2024-12-30 PROCEDURE — 80053 COMPREHEN METABOLIC PANEL: CPT | Performed by: NURSE PRACTITIONER

## 2024-12-30 PROCEDURE — 83036 HEMOGLOBIN GLYCOSYLATED A1C: CPT | Performed by: NURSE PRACTITIONER

## 2024-12-30 PROCEDURE — 80061 LIPID PANEL: CPT | Performed by: NURSE PRACTITIONER

## 2024-12-30 NOTE — TELEPHONE ENCOUNTER
Please call pt with work up result     FT4 levels are high   Please schedule an appt soon, ok lunch hr DG location         Free T4  0.8 - 1.7 ng/dL 2.9 High      Will discuss more next visit   Thanks

## 2024-12-30 NOTE — TELEPHONE ENCOUNTER
Please call pt with work up result   12/30/24  7:28 AM    TSH  0.550 - 4.780 uIU/mL 0.011 Low      TSH is low   Schedule an appt soon or add to waiting list please  Will discuss more next visit   Thanks

## 2024-12-31 ENCOUNTER — OFFICE VISIT (OUTPATIENT)
Facility: LOCATION | Age: 54
End: 2024-12-31
Payer: COMMERCIAL

## 2024-12-31 VITALS
WEIGHT: 246 LBS | DIASTOLIC BLOOD PRESSURE: 72 MMHG | BODY MASS INDEX: 46.44 KG/M2 | SYSTOLIC BLOOD PRESSURE: 118 MMHG | HEART RATE: 78 BPM | HEIGHT: 61 IN

## 2024-12-31 DIAGNOSIS — E05.00 GRAVES DISEASE: Primary | ICD-10-CM

## 2024-12-31 PROCEDURE — 3008F BODY MASS INDEX DOCD: CPT | Performed by: INTERNAL MEDICINE

## 2024-12-31 PROCEDURE — 99214 OFFICE O/P EST MOD 30 MIN: CPT | Performed by: INTERNAL MEDICINE

## 2024-12-31 PROCEDURE — 3074F SYST BP LT 130 MM HG: CPT | Performed by: INTERNAL MEDICINE

## 2024-12-31 PROCEDURE — 3078F DIAST BP <80 MM HG: CPT | Performed by: INTERNAL MEDICINE

## 2024-12-31 RX ORDER — METHIMAZOLE 5 MG/1
5 TABLET ORAL 3 TIMES DAILY
Qty: 90 TABLET | Refills: 0 | Status: SHIPPED | OUTPATIENT
Start: 2025-01-30 | End: 2024-12-31

## 2024-12-31 RX ORDER — METHIMAZOLE 5 MG/1
5 TABLET ORAL 3 TIMES DAILY
Qty: 270 TABLET | Refills: 0 | Status: SHIPPED | OUTPATIENT
Start: 2024-12-31

## 2024-12-31 NOTE — PROGRESS NOTES
Reason for Visit:  Graves goiter, HTN  Requesting Physician:   .Mikie Callaway MD    CHIEF COMPLAINT:    Chief Complaint   Patient presents with    Thyroid Problem     F/u        HISTORY OF PRESENT ILLNESS:   Shannon Mann is a 54 year old female who presents with Graves, hyperthroidism , GO,  dry cough, goiter, HTN, low Vit D, and PE after covid. Was on blood thinner till 2/2023    Graves was Dx in 2017. Started MMI higher doses then titrated to MMI 5 mg/day. was in  remission in 2019.Relapse in 2022. High TSI 10/2022. TSI is normal 8/2024 with negative TSI. Relapsed 12/2024 again   Held Methimazole 5 mg /day.   has some hyperthyroid symptoms like palpitations    Labs showed hyperthyroidism       Stopped  Zepbound after pancreatitis     Wt Readings from Last 6 Encounters:   12/31/24 246 lb (111.6 kg)   11/18/24 244 lb (110.7 kg)   11/02/24 240 lb (108.9 kg)   10/29/24 249 lb (112.9 kg)   08/27/24 257 lb (116.6 kg)   07/29/24 260 lb (117.9 kg)      Saw pulm and ENT   Tried PPI but did not help     Had CTA 6/2024    Menstrual hx: ln 2017, had HYS   Neck surgery: No  Neck radiation: No   Biotin: no  Turmeric:no     Discussed her labs and TSI is normal now       Latest Reference Range & Units 08/23/24 16:47   Thy Stim Immuno 0.00 - 0.55 IU/L 0.44     ASSESSMENT AND PLAN:  54 year old female who presents with Graves, hyperthroidism , GO,  dry cough, goiter, HTN, PE, had pancreatitis 11/2024 stop zepbound  and low Vit D    Clinically and biochemically, hyperthryoid.   Stress recently d/t pancreatitis can trigger.   Do not take GLP-1a in the future   Normal TSI in 8/2024.   D/w pt hyperthyroid symptoms again.    Plan  Labs on Thursday with PCP   If LFTs are higher , will avoid Methimazole   If LFTs are lower, will start 5 mg x3/day of methimazole   Labs and RTC in 1 mo  Use eye drops and ointment  PRN                PAST MEDICAL HISTORY:   Past Medical History:    Graves disease    High blood pressure    History of  blood clots    Bllod clotnin lung    Morbid obesity with BMI of 45.0-49.9, adult (HCC)    Obesity    PONV (postoperative nausea and vomiting)    Pulmonary embolism (HCC)    Unspecified essential hypertension    Visual impairment    contacts and glasses       PAST SURGICAL HISTORY:   Past Surgical History:   Procedure Laterality Date          x3    Cholecystectomy  3/2023    Colonoscopy  2017    Endometrial ablation      Hernia surgery      inguinal hernai repair    Hysterectomy      Hysteroscopy,with endometrial  10/01/2006    Oophorectomy      Removal gallbladder      Total abdominal hysterectomy      Tubal ligation  2004       CURRENT MEDICATIONS:     [START ON 2025] methIMAzole 5 MG Oral Tab Take 1 tablet (5 mg total) by mouth 3 (three) times daily. 90 tablet 0    pantoprazole 40 MG Oral Tab EC Take 1 tablet (40 mg total) by mouth every morning before breakfast. 90 tablet 0    Potassium Chloride ER 10 MEQ Oral Cap CR Take 1 capsule (10 mEq total) by mouth 2 (two) times daily. 90 capsule 1    HYDROCHLOROTHIAZIDE 25 MG Oral Tab TAKE 1 TABLET(25 MG) BY MOUTH DAILY 90 tablet 1    Cholecalciferol (VITAMIN D) 50 MCG (2000 UT) Oral Cap Take by mouth.         ALLERGIES:  Allergies   Allergen Reactions    Neosporin Af [Miconazole Nitrate] RASH     CRREA-blisters    Sulfa Antibiotics OTHER (SEE COMMENTS)    Neomycin RASH    Sulfur-Salicylic Acid [Salicylic Acid-Sulfur] OTHER (SEE COMMENTS)     Cause the skin to bubble.       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       DATA:     Pertinent data reviewed       Latest Reference Range & Units 08/23/24 16:47   Thy Stim Immuno 0.00 - 0.55 IU/L 0.44      06/29/24 14:40   US THYROID (CPT=76536)  Thyromegaly.  No discrete nodules identified.        Rpt: View report in Results Review for more information   06/25/24 16:36   CT ANGIOGRAPHY, CHEST (CPT=71275) Rpt      Latest Reference Range & Units 10/25/22 07:34 10/25/22 07:35 10/27/22 10:19 12/10/22 07:35 01/24/23 16:01   T4,Free (Direct) 0.8 - 1.7 ng/dL 6.5 (H)  4.5 (H) 0.5 (L) 0.2 (L)   TSH 0.358 - 3.740 mIU/mL <0.005 (L)  <0.005 (L) <0.005 (L) 25.300 (H)   T3 TOTAL 60 - 181 ng/dL  363 (H)      T3 FREE 2.40 - 4.20 pg/mL     1.53 (L)   THYROID STIMULATING IMMUNOGLOB <=0.54 IU/L 7.06 (H)             Recent Labs     12/30/24  0728   TSH 0.011*   T4F 2.9*     No results found.    Orders Placed This Encounter   Procedures    Free T4, (Free Thyroxine)    Comp Metabolic Panel [E]     Orders Placed This Encounter    Free T4, (Free Thyroxine)     Standing Status:   Future     Standing Expiration Date:   12/31/2025     Order Specific Question:   Release to patient     Answer:   Immediate    Comp Metabolic Panel [E]     Standing Status:   Future     Standing Expiration Date:   12/31/2025     Order Specific Question:   Release to patient     Answer:   Immediate    methIMAzole 5 MG Oral Tab     Sig: Take 1 tablet (5 mg total) by mouth 3 (three) times daily.     Dispense:  90 tablet     Refill:  0          This  is a specialized patient consultation in endocrinology and required comprehensive review of prior records, as well as current evaluation, with time required for consideration of complex endocrine issues and consultation. For this visit, I personally interviewed the patient, and family member if accompanied, performed the pertinent parts of the history and physical examination. ROS included screening for appropriate endocrine conditions.   Today's diagnosis and plan were reviewed in detail with the patient who states understanding and agrees with plan. I discussed with the patient possible diagnosis, differential diagnosis, need for work up, treatment options, alternatives and side effects.     Please see note for details about time spent which includes:   · pre-visit preparation  · reviewing records  · face to face time with the patient   · timely documentation of the encounter  · ordering medications/tests  · communication with care team  · care coordination    I appreciate the opportunity to be part of your patient's medical care and will keep you, as the referring and primary physicians, informed about the care of your patient. Please feel free to contact me should you have any questions.    The 21st Century Cures Act makes medical notes like these available to patients in the interest of transparency. Please be advised this is a medical document. Medical documents are intended to carry relevant information, facts as evident, and the clinical opinion of the practitioner. The medical note is intended as peer to peer communication and may appear blunt or direct. It is written in medical language and may contain abbreviations or verbiage that are unfamiliar.   Sandra Madison MD

## 2024-12-31 NOTE — PATIENT INSTRUCTIONS
Labs on Thursday with PCP   If LFTs are higher , will avoid Methimazole   If LFTs are lower, will start 5 mg x3/day of methimazole   Labs and RTC in 1 mo    Use eye drops and ointment  PRN

## 2024-12-31 NOTE — TELEPHONE ENCOUNTER
Endocrine refill protocol for medications for hypothyroidism and hyperthyroidism    Protocol Criteria:  failed abnormal labs  If all below requirements are met, send a 90-day supply with 1 refill per provider protocol.    Verify appointment with Endocrinology completed in the last 12 months or scheduled in the next 6 months.    Normal TSH result in the past 12 months   Review recent telephone encounters and mychart communications with patient to ensure a dose change has not occurred since last office visit that was not updated in the medication history list     Last completed office visit:12/31/2024 Sandra Madison MD   Next scheduled Follow up:   Future Appointments   Date Time Provider Department Center          3/4/2025  3:00 PM Sandra Madison MD [facility]      Last TSH result:   TSH   Date Value Ref Range Status   12/30/2024 0.011 (L) 0.550 - 4.780 uIU/mL Final   10/07/2020 0.885 mIU/mL Final   07/28/2020 0.777 0.270 - 4.200 mIU/L Final   07/28/2014 1.06 mIU/L Final     Comment:               Reference Range                         > or = 20 Years  0.40-4.50                              Pregnancy Ranges            First trimester    0.26-2.66            Second trimester   0.55-2.73            Third trimester    0.43-2.91

## 2025-01-02 ENCOUNTER — LAB ENCOUNTER (OUTPATIENT)
Dept: LAB | Age: 55
End: 2025-01-02
Attending: NURSE PRACTITIONER
Payer: COMMERCIAL

## 2025-01-02 ENCOUNTER — OFFICE VISIT (OUTPATIENT)
Dept: INTERNAL MEDICINE CLINIC | Facility: CLINIC | Age: 55
End: 2025-01-02
Payer: COMMERCIAL

## 2025-01-02 VITALS
WEIGHT: 246.19 LBS | SYSTOLIC BLOOD PRESSURE: 136 MMHG | DIASTOLIC BLOOD PRESSURE: 88 MMHG | HEART RATE: 81 BPM | OXYGEN SATURATION: 97 % | HEIGHT: 62.52 IN | BODY MASS INDEX: 44.17 KG/M2

## 2025-01-02 DIAGNOSIS — Z87.19 HISTORY OF PANCREATITIS: ICD-10-CM

## 2025-01-02 DIAGNOSIS — Z86.711 HISTORY OF PULMONARY EMBOLISM: ICD-10-CM

## 2025-01-02 DIAGNOSIS — R73.03 PREDIABETES: ICD-10-CM

## 2025-01-02 DIAGNOSIS — Z00.00 ROUTINE GENERAL MEDICAL EXAMINATION AT A HEALTH CARE FACILITY: Primary | ICD-10-CM

## 2025-01-02 DIAGNOSIS — Z90.710 S/P HYSTERECTOMY: ICD-10-CM

## 2025-01-02 DIAGNOSIS — E05.00 GRAVES DISEASE: ICD-10-CM

## 2025-01-02 DIAGNOSIS — E05.90 HYPERTHYROIDISM: ICD-10-CM

## 2025-01-02 DIAGNOSIS — Z90.49 HISTORY OF CHOLECYSTECTOMY: ICD-10-CM

## 2025-01-02 DIAGNOSIS — M17.12 PRIMARY OSTEOARTHRITIS OF LEFT KNEE: ICD-10-CM

## 2025-01-02 DIAGNOSIS — R79.89 ELEVATED LFTS: ICD-10-CM

## 2025-01-02 DIAGNOSIS — E78.5 HYPERLIPIDEMIA, UNSPECIFIED HYPERLIPIDEMIA TYPE: ICD-10-CM

## 2025-01-02 DIAGNOSIS — I10 PRIMARY HYPERTENSION: ICD-10-CM

## 2025-01-02 DIAGNOSIS — Z12.31 ENCOUNTER FOR SCREENING MAMMOGRAM FOR MALIGNANT NEOPLASM OF BREAST: ICD-10-CM

## 2025-01-02 LAB
ALBUMIN SERPL-MCNC: 4.2 G/DL (ref 3.2–4.8)
ALBUMIN/GLOB SERPL: 1.3 {RATIO} (ref 1–2)
ALP LIVER SERPL-CCNC: 141 U/L
ALT SERPL-CCNC: 54 U/L
ANION GAP SERPL CALC-SCNC: 9 MMOL/L (ref 0–18)
AST SERPL-CCNC: 30 U/L (ref ?–34)
BILIRUB SERPL-MCNC: 0.6 MG/DL (ref 0.3–1.2)
BUN BLD-MCNC: 16 MG/DL (ref 9–23)
CALCIUM BLD-MCNC: 10.1 MG/DL (ref 8.7–10.4)
CHLORIDE SERPL-SCNC: 103 MMOL/L (ref 98–112)
CO2 SERPL-SCNC: 30 MMOL/L (ref 21–32)
CREAT BLD-MCNC: 0.68 MG/DL
EGFRCR SERPLBLD CKD-EPI 2021: 105 ML/MIN/1.73M2 (ref 60–?)
FASTING STATUS PATIENT QL REPORTED: YES
GLOBULIN PLAS-MCNC: 3.3 G/DL (ref 2–3.5)
GLUCOSE BLD-MCNC: 93 MG/DL (ref 70–99)
OSMOLALITY SERPL CALC.SUM OF ELEC: 295 MOSM/KG (ref 275–295)
POTASSIUM SERPL-SCNC: 3.9 MMOL/L (ref 3.5–5.1)
PROT SERPL-MCNC: 7.5 G/DL (ref 5.7–8.2)
SODIUM SERPL-SCNC: 142 MMOL/L (ref 136–145)

## 2025-01-02 PROCEDURE — 80053 COMPREHEN METABOLIC PANEL: CPT | Performed by: NURSE PRACTITIONER

## 2025-01-02 RX ORDER — PANTOPRAZOLE SODIUM 40 MG/1
40 TABLET, DELAYED RELEASE ORAL
Qty: 90 TABLET | Refills: 1 | Status: SHIPPED | OUTPATIENT
Start: 2025-01-02 | End: 2025-01-02

## 2025-01-02 RX ORDER — PANTOPRAZOLE SODIUM 40 MG/1
40 TABLET, DELAYED RELEASE ORAL
Qty: 90 TABLET | Refills: 3 | Status: SHIPPED | OUTPATIENT
Start: 2025-01-02

## 2025-01-02 RX ORDER — FLUTICASONE PROPIONATE AND SALMETEROL XINAFOATE 115; 21 UG/1; UG/1
1 AEROSOL, METERED RESPIRATORY (INHALATION) 2 TIMES DAILY
Qty: 1 EACH | Refills: 3 | Status: SHIPPED | OUTPATIENT
Start: 2025-01-02

## 2025-01-03 ENCOUNTER — PATIENT MESSAGE (OUTPATIENT)
Dept: INTERNAL MEDICINE CLINIC | Facility: CLINIC | Age: 55
End: 2025-01-03

## 2025-01-03 RX ORDER — FLUTICASONE PROPIONATE AND SALMETEROL 250; 50 UG/1; UG/1
1 POWDER RESPIRATORY (INHALATION) 2 TIMES DAILY
Qty: 60 EACH | Refills: 1 | Status: SHIPPED | OUTPATIENT
Start: 2025-01-03

## 2025-01-03 RX ORDER — FLUTICASONE PROPIONATE AND SALMETEROL 100; 50 UG/1; UG/1
1 POWDER RESPIRATORY (INHALATION) 2 TIMES DAILY
Refills: 0 | Status: CANCELLED
Start: 2025-01-03

## 2025-01-03 NOTE — TELEPHONE ENCOUNTER
Patient states that the fluticasone salmeterol 115-21MCG/ACT is not covered    Per her insurance website   Fluticasone-salmeterol 100-50MCG.ACT is covered    Ok to change RX?    pended

## 2025-01-08 RX ORDER — FLUTICASONE PROPIONATE AND SALMETEROL 250; 50 UG/1; UG/1
1 POWDER RESPIRATORY (INHALATION) 2 TIMES DAILY
Qty: 180 EACH | Refills: 0 | OUTPATIENT
Start: 2025-01-08

## 2025-01-14 ENCOUNTER — TELEPHONE (OUTPATIENT)
Dept: INTERNAL MEDICINE CLINIC | Facility: CLINIC | Age: 55
End: 2025-01-14

## 2025-01-14 DIAGNOSIS — R52 WHOLE BODY PAIN: ICD-10-CM

## 2025-01-14 DIAGNOSIS — R79.89 ELEVATED LFTS: Primary | ICD-10-CM

## 2025-01-14 NOTE — TELEPHONE ENCOUNTER
Ov made vv for body pain.  Given recent events with elevated LFT and cough, would prefer in person.  Will add CK, alk phos isoenzymes and UA to CMP to be completed nonfasting prior to this visit.

## 2025-01-18 ENCOUNTER — LAB ENCOUNTER (OUTPATIENT)
Dept: LAB | Age: 55
End: 2025-01-18
Attending: NURSE PRACTITIONER
Payer: COMMERCIAL

## 2025-01-18 DIAGNOSIS — R52 WHOLE BODY PAIN: ICD-10-CM

## 2025-01-18 DIAGNOSIS — R79.89 ELEVATED LFTS: ICD-10-CM

## 2025-01-18 LAB
ALBUMIN SERPL-MCNC: 4.3 G/DL (ref 3.2–4.8)
ALBUMIN/GLOB SERPL: 1.3 {RATIO} (ref 1–2)
ALP LIVER SERPL-CCNC: 91 U/L
ALT SERPL-CCNC: <7 U/L
ANION GAP SERPL CALC-SCNC: 8 MMOL/L (ref 0–18)
AST SERPL-CCNC: 18 U/L (ref ?–34)
BILIRUB SERPL-MCNC: 0.6 MG/DL (ref 0.3–1.2)
BILIRUB UR QL STRIP.AUTO: NEGATIVE
BUN BLD-MCNC: 13 MG/DL (ref 9–23)
CALCIUM BLD-MCNC: 10.3 MG/DL (ref 8.7–10.6)
CHLORIDE SERPL-SCNC: 103 MMOL/L (ref 98–112)
CK SERPL-CCNC: 69 U/L
CO2 SERPL-SCNC: 30 MMOL/L (ref 21–32)
COLOR UR AUTO: YELLOW
CREAT BLD-MCNC: 0.66 MG/DL
EGFRCR SERPLBLD CKD-EPI 2021: 105 ML/MIN/1.73M2 (ref 60–?)
FASTING STATUS PATIENT QL REPORTED: YES
GLOBULIN PLAS-MCNC: 3.3 G/DL (ref 2–3.5)
GLUCOSE BLD-MCNC: 95 MG/DL (ref 70–99)
GLUCOSE UR STRIP.AUTO-MCNC: NORMAL MG/DL
KETONES UR STRIP.AUTO-MCNC: NEGATIVE MG/DL
LEUKOCYTE ESTERASE UR QL STRIP.AUTO: NEGATIVE
NITRITE UR QL STRIP.AUTO: NEGATIVE
OSMOLALITY SERPL CALC.SUM OF ELEC: 292 MOSM/KG (ref 275–295)
PH UR STRIP.AUTO: 7.5 [PH] (ref 5–8)
POTASSIUM SERPL-SCNC: 4 MMOL/L (ref 3.5–5.1)
PROT SERPL-MCNC: 7.6 G/DL (ref 5.7–8.2)
PROT UR STRIP.AUTO-MCNC: NEGATIVE MG/DL
RBC UR QL AUTO: NEGATIVE
SODIUM SERPL-SCNC: 141 MMOL/L (ref 136–145)
SP GR UR STRIP.AUTO: 1.02 (ref 1–1.03)
UROBILINOGEN UR STRIP.AUTO-MCNC: NORMAL MG/DL

## 2025-01-18 PROCEDURE — 82550 ASSAY OF CK (CPK): CPT

## 2025-01-18 PROCEDURE — 36415 COLL VENOUS BLD VENIPUNCTURE: CPT

## 2025-01-18 PROCEDURE — 80053 COMPREHEN METABOLIC PANEL: CPT

## 2025-01-18 PROCEDURE — 81001 URINALYSIS AUTO W/SCOPE: CPT

## 2025-01-18 PROCEDURE — 84080 ASSAY ALKALINE PHOSPHATASES: CPT

## 2025-01-18 PROCEDURE — 84075 ASSAY ALKALINE PHOSPHATASE: CPT

## 2025-01-21 DIAGNOSIS — E78.5 HYPERLIPIDEMIA, UNSPECIFIED HYPERLIPIDEMIA TYPE: ICD-10-CM

## 2025-01-21 DIAGNOSIS — E05.90 HYPERTHYROIDISM: ICD-10-CM

## 2025-01-21 DIAGNOSIS — E66.01 MORBID OBESITY (HCC): ICD-10-CM

## 2025-01-21 DIAGNOSIS — E05.00 GRAVES DISEASE: ICD-10-CM

## 2025-01-21 DIAGNOSIS — Z90.49 HISTORY OF CHOLECYSTECTOMY: ICD-10-CM

## 2025-01-21 DIAGNOSIS — Z86.711 HISTORY OF PULMONARY EMBOLISM: ICD-10-CM

## 2025-01-21 DIAGNOSIS — I10 PRIMARY HYPERTENSION: ICD-10-CM

## 2025-01-21 DIAGNOSIS — Z90.710 S/P HYSTERECTOMY: ICD-10-CM

## 2025-01-21 DIAGNOSIS — R73.03 PREDIABETES: ICD-10-CM

## 2025-01-22 LAB
ALK PHOSPHATASE: 95 IU/L
BONE FRAC: 23 %
INTESTINAL FRAC: 22 %
LIVER FRAC: 55 %

## 2025-01-22 RX ORDER — HYDROCHLOROTHIAZIDE 25 MG/1
25 TABLET ORAL DAILY
Qty: 90 TABLET | Refills: 1 | Status: SHIPPED | OUTPATIENT
Start: 2025-01-22

## 2025-01-30 RX ORDER — POTASSIUM CHLORIDE 750 MG/1
10 CAPSULE, EXTENDED RELEASE ORAL 2 TIMES DAILY
Qty: 180 CAPSULE | Refills: 3 | Status: SHIPPED | OUTPATIENT
Start: 2025-01-30

## 2025-01-30 NOTE — TELEPHONE ENCOUNTER
Protocol failed/ No protocol    Requested Prescriptions     Pending Prescriptions Disp Refills    POTASSIUM CHLORIDE ER 10 MEQ Oral Cap CR [Pharmacy Med Name: POTASSIUM CL 10MEQ ER CAPSULES] 90 capsule 1     Sig: TAKE 1 CAPSULE(10 MEQ) BY MOUTH TWICE DAILY

## 2025-02-07 RX ORDER — FLUTICASONE PROPIONATE AND SALMETEROL 250; 50 UG/1; UG/1
1 POWDER RESPIRATORY (INHALATION) 2 TIMES DAILY
Qty: 180 EACH | Refills: 1 | Status: SHIPPED | OUTPATIENT
Start: 2025-02-07

## 2025-02-07 NOTE — TELEPHONE ENCOUNTER
Please review; protocol failed/No Protocol    Requested Prescriptions   Pending Prescriptions Disp Refills    FLUTICASONE-SALMETEROL 250-50 MCG/ACT Inhalation Aerosol Powder, Breath Activated [Pharmacy Med Name: FLUTICASONE/SALM DISK 250/50MCG 60S] 60 each 1     Sig: INHALE 1 PUFF INTO THE LUNGS TWICE DAILY       Asthma & COPD Medication Protocol Failed - 2/7/2025  9:49 AM        Failed - ACT Score greater than or equal to 20                Failed - ACT recorded in the last 12 months                Passed - Appointment in past 6 or next 3 months      Recent Outpatient Visits              1 month ago Routine general medical examination at a health care facility    [address] Fanta Flores APRN    Office Visit    1 month ago Graves disease    [facility] Sandra Madison MD    Office Visit    2 months ago Generalized abdominal pain    [address]    Office Visit    3 months ago Graves disease    [facility] Sandra Madison MD    Office Visit    5 months ago Laryngopharyngeal reflux (LPR)    [facility] Arash Ken DO    Office Visit          Future Appointments         Provider Department Appt Notes    In 3 weeks Sandra Madison MD [facility] Tsh t4 levels                    Passed - Medication is active on med list           Future Appointments         Provider Department Appt Notes    In 3 weeks Sandra Madison MD [facility] Tsh t4 levels          Recent Outpatient Visits              1 month ago Routine general medical examination at a health care facility    [address] Fanta Flores, HEENA    Office  Visit    1 month ago Graves disease    [facility] Sandra Madison MD    Office Visit    2 months ago Generalized abdominal pain    [facility], [address] [place] [address] Fanta Saunders APRN    Office Visit    3 months ago Graves disease    [facility] Sandra Madison MD    Office Visit    5 months ago Laryngopharyngeal reflux (LPR)    [facility] Arash Ken DO    Office Visit

## 2025-02-17 ENCOUNTER — HOSPITAL ENCOUNTER (OUTPATIENT)
Age: 55
Discharge: HOME | End: 2025-02-17
Attending: EMERGENCY MEDICINE
Payer: COMMERCIAL

## 2025-02-17 VITALS
WEIGHT: 240 LBS | TEMPERATURE: 99 F | BODY MASS INDEX: 45.31 KG/M2 | OXYGEN SATURATION: 96 % | RESPIRATION RATE: 20 BRPM | HEIGHT: 61 IN | SYSTOLIC BLOOD PRESSURE: 145 MMHG | DIASTOLIC BLOOD PRESSURE: 85 MMHG | HEART RATE: 98 BPM

## 2025-02-17 DIAGNOSIS — J11.1 INFLUENZA: Primary | ICD-10-CM

## 2025-02-17 PROCEDURE — 99213 OFFICE O/P EST LOW 20 MIN: CPT

## 2025-02-17 PROCEDURE — 87502 INFLUENZA DNA AMP PROBE: CPT | Performed by: EMERGENCY MEDICINE

## 2025-02-17 RX ORDER — BENZONATATE 100 MG/1
100 CAPSULE ORAL 3 TIMES DAILY PRN
Qty: 20 CAPSULE | Refills: 0 | Status: SHIPPED | OUTPATIENT
Start: 2025-02-17

## 2025-02-17 NOTE — ED PROVIDER NOTES
Patient Seen in: [facility]      History   No chief complaint on file.    Stated Complaint: Fever    Subjective:   HPI    55-year-old female who works as a  presents to the immediate care for complaints of 2 days of fever chills cough congestion.  Complains of myalgias.  Patient has a nonproductive cough.  There is no complaints of any vomiting diarrhea.  Temperatures been as high as 102.  She denies any other exacerbating leaving factors.      Objective:     No pertinent past medical history.            No pertinent past surgical history.              No pertinent social history.            Review of Systems    Positive for stated complaint: Fever  Other systems are as noted in HPI.  Constitutional and vital signs reviewed.      All other systems reviewed and negative except as noted above.    Physical Exam     ED Triage Vitals [02/17/25 0840]   /85   Pulse 98   Resp 20   Temp 99.4 °F (37.4 °C)   Temp src Oral   SpO2 96 %   O2 Device None (Room air)       Current Vitals:   Vital Signs  BP: 145/85  Pulse: 98  Resp: 20  Temp: 99.4 °F (37.4 °C)  Temp src: Oral    Oxygen Therapy  SpO2: 96 %  O2 Device: None (Room air)        Physical Exam   General: Alert and oriented. No acute distress.  HEENT: Normocephalic. No evidence of trauma. Extraocular movements are intact.  Cardiovascular exam: Regular rate and rhythm  Lungs: Clear to auscultation bilaterally.  Abdomen: Soft, nondistended, nontender.  Extremities: No evidence of deformity. No clubbing or cyanosis.  Neuro: No focal deficit is noted.    ED Course     Labs Reviewed   POCT FLU TEST   RAPID SARS-COV-2 BY PCR            MDM   Patient was screened and evaluated during this visit.   As a treating physician attending to the patient, I determined, within reasonable clinical confidence and prior to discharge, that an emergency medical condition was not or was no longer present.  There was no indication for further evaluation,  treatment or admission on an emergency basis.  Comprehensive verbal and written discharge and follow-up instructions were provided to help prevent relapse or worsening.  Patient was instructed to follow-up with her primary care provider for further evaluation and treatment, but to return immediately to the ER for worsening, concerning, new, changing or persisting symptoms.  I discussed the case with the patient and they had no questions, complaints, or concerns.  Patient felt comfortable going home.    ^^Please note that this report has been produced using speech recognition software and may contain errors related to that system including, but not limited to, errors in grammar, punctuation, and spelling, as well as words and phrases that possibly may have been recognized inappropriately.  If there are any questions or concerns, contact the dictating provider for clarification        MDM    Disposition and Plan     Clinical Impression:  No diagnosis found.     Disposition:  There is no disposition on file for this visit.  There is no disposition time on file for this visit.    Follow-up:  No follow-up provider specified.        Medications Prescribed:  Current Discharge Medication List              Supplementary Documentation:

## 2025-02-17 NOTE — DISCHARGE INSTRUCTIONS
Follow up with your primary care doctor  Take tylenol or motrin for fever  Take tamiflu twice a day for 5 days  Push fluids  Take benzonate cough medicine three times a day as needed

## 2025-03-01 ENCOUNTER — APPOINTMENT (OUTPATIENT)
Dept: GENERAL RADIOLOGY | Age: 55
End: 2025-03-01
Attending: EMERGENCY MEDICINE
Payer: COMMERCIAL

## 2025-03-01 ENCOUNTER — HOSPITAL ENCOUNTER (OUTPATIENT)
Age: 55
Discharge: HOME | End: 2025-03-01
Attending: EMERGENCY MEDICINE
Payer: COMMERCIAL

## 2025-03-01 VITALS
DIASTOLIC BLOOD PRESSURE: 84 MMHG | HEART RATE: 91 BPM | WEIGHT: 242 LBS | TEMPERATURE: 98 F | SYSTOLIC BLOOD PRESSURE: 148 MMHG | OXYGEN SATURATION: 97 % | BODY MASS INDEX: 45.69 KG/M2 | RESPIRATION RATE: 19 BRPM | HEIGHT: 61 IN

## 2025-03-01 DIAGNOSIS — J06.9 UPPER RESPIRATORY TRACT INFECTION, UNSPECIFIED TYPE: Primary | ICD-10-CM

## 2025-03-01 LAB — SARS-COV-2 RNA RESP QL NAA+PROBE: NOT DETECTED

## 2025-03-01 PROCEDURE — 99214 OFFICE O/P EST MOD 30 MIN: CPT

## 2025-03-01 PROCEDURE — 71046 X-RAY EXAM CHEST 2 VIEWS: CPT | Performed by: EMERGENCY MEDICINE

## 2025-03-01 RX ORDER — ALBUTEROL SULFATE 90 UG/1
2 INHALANT RESPIRATORY (INHALATION) EVERY 4 HOURS PRN
Qty: 1 EACH | Refills: 0 | Status: SHIPPED | OUTPATIENT
Start: 2025-03-01 | End: 2025-03-31

## 2025-03-01 NOTE — ED PROVIDER NOTES
Patient Seen in: [facility]      History     Chief Complaint   Patient presents with    Cough/URI     Stated Complaint: Chest Pain; Cough    Subjective:   HPI      Patient is a 55-year-old female who states that 2 weeks ago she was diagnosed with influenza.  Patient states symptoms improved but she has had persistent cough.  Patient has cough productive yellowish phlegm.  Patient states she does have some discomfort when she coughs.  Patient denies any chest pain at rest, no radiation of this pain.  No sore throat, no nasal congestion, no fevers.  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  Remainder of review of systems negative.    Objective:     Past Medical History:    Graves disease    High blood pressure    History of blood clots    Bllod clotnin lung    Morbid obesity with BMI of 45.0-49.9, adult (HCC)    Obesity    PONV (postoperative nausea and vomiting)    Pulmonary embolism (HCC)    Unspecified essential hypertension    Visual impairment    contacts and glasses              Past Surgical History:   Procedure Laterality Date          x3    Cholecystectomy  3/2023    Colonoscopy  2017    Endometrial ablation      Hernia surgery      inguinal hernai repair    Hysterectomy      Hysteroscopy,with endometrial  10/01/2006    Oophorectomy      Removal gallbladder      Total abdominal hysterectomy      Tubal ligation  2004                Social History     Socioeconomic History    Marital status:    Tobacco Use    Smoking status: Never     Passive exposure: Past    Smokeless tobacco: Never   Vaping Use    Vaping status: Never Used   Substance and Sexual Activity    Alcohol use: Not Currently     Alcohol/week: 1.0 standard drink of alcohol    Drug use: No    Sexual activity: Yes   Other Topics Concern    Exercise No     Social Drivers of Health      [facility]          Non-smoker, no  alcohol    Review of Systems    Positive for stated complaint: Chest Pain; Cough  Other systems are as noted in HPI.  Constitutional and vital signs reviewed.      All other systems reviewed and negative except as noted above.    Physical Exam     ED Triage Vitals [03/01/25 1209]   /84   Pulse 91   Resp 19   Temp 98.2 °F (36.8 °C)   Temp src Oral   SpO2 97 %   O2 Device None (Room air)       Current Vitals:   Vital Signs  BP: 148/84  Pulse: 91  Resp: 19  Temp: 98.2 °F (36.8 °C)  Temp src: Oral    Oxygen Therapy  SpO2: 97 %  O2 Device: None (Room air)        Physical Exam   GENERAL: Patient resting comfortably on the cart in no acute distress.  HEENT: Extraocular muscles intact, pupils equal round reactive to light.  Mouth normal, neck supple, no meningismus.  Tympanic membranes normal bilaterally  LUNGS: Lungs clear to auscultation bilaterally.  CARDIOVASCULAR: + S1-S2, regular rate and rhythm, no murmurs.  EXTREMITIES: Full range of motion, no tenderness, good capillary refill.  No calf tenderness or edema  SKIN: No rash, good turgor.  NEURO: Patient answers questions appropriately.  No focal deficits appreciated.        ED Course     Labs Reviewed   RAPID SARS-COV-2 BY PCR - Normal          Chest x-ray   Nonspecific subtle bronchial wall thickening, but consider subtle bronchitis.  Cardiac enlargement.   Independent reviewed by myself, no pneumothorax       MDM      Patient was stable during her stay in ED care.  Patient is x-ray is negative.  Patient negative COVID.  Patient has likely viral URI.  Recommend rest, plenty fluids.  Tylenol ibuprofen if needed.  Albuterol inhaler as prescribed.  Follow-up for further evaluation.  Return if new or worse symptoms.  I  did consider pneumothorax, pneumonia, viral URI.        Medical Decision Making      Disposition and Plan     Clinical Impression:  1. Upper respiratory tract infection, unspecified type         Disposition:  Discharge  3/1/2025  1:14  pm    Follow-up:  Yury Callaway MD  [address] [address]  [phone]    In 1 week            Medications Prescribed:  Current Discharge Medication List        START taking these medications    Details   albuterol 108 (90 Base) MCG/ACT Inhalation Aero Soln Inhale 2 puffs into the lungs every 4 (four) hours as needed for Wheezing.  Qty: 1 each, Refills: 0                 Supplementary Documentation:

## 2025-03-01 NOTE — ED INITIAL ASSESSMENT (HPI)
Patient reports she recently had the flu around valentine's day.  Patient reports lately she has had a cough that hurts her chest when she coughs.

## 2025-03-01 NOTE — DISCHARGE INSTRUCTIONS
Follow-up for further evaluation primary physician.  Return if new or worse symptoms.  Albuterol as prescribed.  Rest, plenty fluids.

## 2025-03-15 ENCOUNTER — LAB ENCOUNTER (OUTPATIENT)
Dept: LAB | Age: 55
End: 2025-03-15
Attending: INTERNAL MEDICINE
Payer: COMMERCIAL

## 2025-03-15 DIAGNOSIS — E05.00 GRAVES DISEASE: ICD-10-CM

## 2025-03-15 LAB
ALBUMIN SERPL-MCNC: 4.5 G/DL (ref 3.2–4.8)
ALBUMIN/GLOB SERPL: 1.3 {RATIO} (ref 1–2)
ALP LIVER SERPL-CCNC: 80 U/L
ALT SERPL-CCNC: <7 U/L
ANION GAP SERPL CALC-SCNC: 6 MMOL/L (ref 0–18)
AST SERPL-CCNC: 16 U/L (ref ?–34)
BILIRUB SERPL-MCNC: 0.7 MG/DL (ref 0.3–1.2)
BUN BLD-MCNC: 13 MG/DL (ref 9–23)
CALCIUM BLD-MCNC: 9.5 MG/DL (ref 8.7–10.6)
CHLORIDE SERPL-SCNC: 103 MMOL/L (ref 98–112)
CO2 SERPL-SCNC: 31 MMOL/L (ref 21–32)
CREAT BLD-MCNC: 0.77 MG/DL
EGFRCR SERPLBLD CKD-EPI 2021: 92 ML/MIN/1.73M2 (ref 60–?)
FASTING STATUS PATIENT QL REPORTED: NO
GLOBULIN PLAS-MCNC: 3.4 G/DL (ref 2–3.5)
GLUCOSE BLD-MCNC: 89 MG/DL (ref 70–99)
OSMOLALITY SERPL CALC.SUM OF ELEC: 290 MOSM/KG (ref 275–295)
POTASSIUM SERPL-SCNC: 3.8 MMOL/L (ref 3.5–5.1)
PROT SERPL-MCNC: 7.9 G/DL (ref 5.7–8.2)
SODIUM SERPL-SCNC: 140 MMOL/L (ref 136–145)
T4 FREE SERPL-MCNC: 0.6 NG/DL (ref 0.8–1.7)

## 2025-03-15 PROCEDURE — 80053 COMPREHEN METABOLIC PANEL: CPT

## 2025-03-15 PROCEDURE — 84439 ASSAY OF FREE THYROXINE: CPT

## 2025-03-15 PROCEDURE — 36415 COLL VENOUS BLD VENIPUNCTURE: CPT

## 2025-03-18 ENCOUNTER — LAB ENCOUNTER (OUTPATIENT)
Dept: LAB | Age: 55
End: 2025-03-18
Attending: INTERNAL MEDICINE
Payer: COMMERCIAL

## 2025-03-18 ENCOUNTER — OFFICE VISIT (OUTPATIENT)
Facility: LOCATION | Age: 55
End: 2025-03-18
Payer: COMMERCIAL

## 2025-03-18 VITALS
TEMPERATURE: 98 F | HEIGHT: 61 IN | WEIGHT: 255 LBS | HEART RATE: 88 BPM | SYSTOLIC BLOOD PRESSURE: 132 MMHG | BODY MASS INDEX: 48.15 KG/M2 | DIASTOLIC BLOOD PRESSURE: 90 MMHG

## 2025-03-18 DIAGNOSIS — E66.813 CLASS 3 SEVERE OBESITY WITH BODY MASS INDEX (BMI) OF 45.0 TO 49.9 IN ADULT, UNSPECIFIED OBESITY TYPE, UNSPECIFIED WHETHER SERIOUS COMORBIDITY PRESENT (HCC): ICD-10-CM

## 2025-03-18 DIAGNOSIS — E78.5 HYPERLIPIDEMIA, UNSPECIFIED HYPERLIPIDEMIA TYPE: ICD-10-CM

## 2025-03-18 DIAGNOSIS — E66.01 CLASS 3 SEVERE OBESITY WITH BODY MASS INDEX (BMI) OF 45.0 TO 49.9 IN ADULT, UNSPECIFIED OBESITY TYPE, UNSPECIFIED WHETHER SERIOUS COMORBIDITY PRESENT (HCC): ICD-10-CM

## 2025-03-18 DIAGNOSIS — E05.90 HYPERTHYROIDISM: ICD-10-CM

## 2025-03-18 DIAGNOSIS — R73.03 PREDIABETES: ICD-10-CM

## 2025-03-18 DIAGNOSIS — R05.9 COUGH, UNSPECIFIED TYPE: ICD-10-CM

## 2025-03-18 DIAGNOSIS — E05.00 GRAVES DISEASE: Primary | ICD-10-CM

## 2025-03-18 DIAGNOSIS — E05.00 GRAVES DISEASE: ICD-10-CM

## 2025-03-18 DIAGNOSIS — Z90.710 S/P HYSTERECTOMY: ICD-10-CM

## 2025-03-18 DIAGNOSIS — E66.01 MORBID OBESITY (HCC): ICD-10-CM

## 2025-03-18 DIAGNOSIS — E07.9 THYROID DISEASE: ICD-10-CM

## 2025-03-18 DIAGNOSIS — E05.00 GRAVES' ORBITOPATHY: ICD-10-CM

## 2025-03-18 LAB
ALBUMIN SERPL-MCNC: 4.2 G/DL (ref 3.2–4.8)
ALBUMIN/GLOB SERPL: 1.2 {RATIO} (ref 1–2)
ALP LIVER SERPL-CCNC: 95 U/L
ALT SERPL-CCNC: <7 U/L
ANION GAP SERPL CALC-SCNC: 4 MMOL/L (ref 0–18)
AST SERPL-CCNC: 15 U/L (ref ?–34)
BASOPHILS # BLD AUTO: 0.02 X10(3) UL (ref 0–0.2)
BASOPHILS NFR BLD AUTO: 0.3 %
BILIRUB SERPL-MCNC: 0.4 MG/DL (ref 0.3–1.2)
BUN BLD-MCNC: 15 MG/DL (ref 9–23)
BUN/CREAT SERPL: 17.9 (ref 10–20)
CALCIUM BLD-MCNC: 9.6 MG/DL (ref 8.7–10.4)
CHLORIDE SERPL-SCNC: 101 MMOL/L (ref 98–112)
CO2 SERPL-SCNC: 32 MMOL/L (ref 21–32)
CREAT BLD-MCNC: 0.84 MG/DL
DEPRECATED RDW RBC AUTO: 40.1 FL (ref 35.1–46.3)
EGFRCR SERPLBLD CKD-EPI 2021: 83 ML/MIN/1.73M2 (ref 60–?)
EOSINOPHIL # BLD AUTO: 0.17 X10(3) UL (ref 0–0.7)
EOSINOPHIL NFR BLD AUTO: 2.4 %
ERYTHROCYTE [DISTWIDTH] IN BLOOD BY AUTOMATED COUNT: 12.9 % (ref 11–15)
GLOBULIN PLAS-MCNC: 3.5 G/DL (ref 2–3.5)
GLUCOSE BLD-MCNC: 92 MG/DL (ref 70–99)
HCT VFR BLD AUTO: 37.8 %
HGB BLD-MCNC: 12.6 G/DL
IMM GRANULOCYTES # BLD AUTO: 0.02 X10(3) UL (ref 0–1)
IMM GRANULOCYTES NFR BLD: 0.3 %
LYMPHOCYTES # BLD AUTO: 2.62 X10(3) UL (ref 1–4)
LYMPHOCYTES NFR BLD AUTO: 36.7 %
MCH RBC QN AUTO: 28.6 PG (ref 26–34)
MCHC RBC AUTO-ENTMCNC: 33.3 G/DL (ref 31–37)
MCV RBC AUTO: 85.9 FL
MONOCYTES # BLD AUTO: 0.63 X10(3) UL (ref 0.1–1)
MONOCYTES NFR BLD AUTO: 8.8 %
NEUTROPHILS # BLD AUTO: 3.68 X10 (3) UL (ref 1.5–7.7)
NEUTROPHILS # BLD AUTO: 3.68 X10(3) UL (ref 1.5–7.7)
NEUTROPHILS NFR BLD AUTO: 51.5 %
OSMOLALITY SERPL CALC.SUM OF ELEC: 284 MOSM/KG (ref 275–295)
PLATELET # BLD AUTO: 288 10(3)UL (ref 150–450)
POTASSIUM SERPL-SCNC: 3.6 MMOL/L (ref 3.5–5.1)
PROT SERPL-MCNC: 7.7 G/DL (ref 5.7–8.2)
RBC # BLD AUTO: 4.4 X10(6)UL
SODIUM SERPL-SCNC: 137 MMOL/L (ref 136–145)
WBC # BLD AUTO: 7.1 X10(3) UL (ref 4–11)

## 2025-03-18 PROCEDURE — 3008F BODY MASS INDEX DOCD: CPT | Performed by: INTERNAL MEDICINE

## 2025-03-18 PROCEDURE — 80053 COMPREHEN METABOLIC PANEL: CPT | Performed by: INTERNAL MEDICINE

## 2025-03-18 PROCEDURE — 3080F DIAST BP >= 90 MM HG: CPT | Performed by: INTERNAL MEDICINE

## 2025-03-18 PROCEDURE — 3075F SYST BP GE 130 - 139MM HG: CPT | Performed by: INTERNAL MEDICINE

## 2025-03-18 PROCEDURE — 36415 COLL VENOUS BLD VENIPUNCTURE: CPT | Performed by: INTERNAL MEDICINE

## 2025-03-18 PROCEDURE — 85025 COMPLETE CBC W/AUTO DIFF WBC: CPT | Performed by: INTERNAL MEDICINE

## 2025-03-18 PROCEDURE — 99214 OFFICE O/P EST MOD 30 MIN: CPT | Performed by: INTERNAL MEDICINE

## 2025-03-18 RX ORDER — METHIMAZOLE 5 MG/1
5 TABLET ORAL 2 TIMES DAILY
Qty: 180 TABLET | Refills: 0 | Status: SHIPPED | OUTPATIENT
Start: 2025-04-30

## 2025-03-18 NOTE — PROGRESS NOTES
Reason for Visit:  Graves goiter, HTN  Requesting Physician:   .Mikie Callaway MD    CHIEF COMPLAINT:    Chief Complaint   Patient presents with    Thyroid Problem     F/u        HISTORY OF PRESENT ILLNESS:   Shannon Mann is a 55 year old female who presents with Graves, hyperthroidism , GO,  dry cough, goiter, HTN, low Vit D, and PE after covid. Was on blood thinner till 2/2023    Had flu in Feb and was sick   She was started on inhalers   She feels thyroid is enlarged   She was started MMI 5 mg TID.    Symptoms improved after starting MMI till she got sick with flu     Graves was Dx in 2017. Started MMI higher doses then titrated to MMI 5 mg/day. was in  remission in 2019.Relapse in 2022. High TSI 10/2022. TSI is normal 8/2024 with negative TSI. Relapsed 12/2024 again   Stopped  Zepbound after pancreatitis     Wt Readings from Last 6 Encounters:   03/18/25 255 lb (115.7 kg)   03/01/25 242 lb (109.8 kg)   02/17/25 240 lb (108.9 kg)   01/02/25 246 lb 3.2 oz (111.7 kg)   12/31/24 246 lb (111.6 kg)   11/18/24 244 lb (110.7 kg)      Saw pulm and ENT   Had CTA 6/2024    Menstrual hx: ln 2017, had HYS   Neck surgery: No  Neck radiation: No   Biotin: no  Turmeric:no     Discussed her labs and TSI is normal now       Latest Reference Range & Units 08/23/24 16:47   Thy Stim Immuno 0.00 - 0.55 IU/L 0.44     ASSESSMENT AND PLAN:  55 year old female who presents with Graves, hyperthroidism , GO,  dry cough, goiter, HTN, PE, had pancreatitis 11/2024 stop zepbound  and low Vit D  Stress recently d/t pancreatitis can trigger.   Do not take GLP-1a in the future   Clinically, still has symptoms from flu few weeks ago. Will check labs and stop Methimazole if indicated.    Biochemically, low FT4   Normal TSI in 8/2024.     Plan   Labs today - CMP, CBC, no fever today   Labs and follow up in 1-2 mo     Will decrease methimazole to 5 mg twice a day but today and tomorrow take one tablet only. Will consider ENT consult.      Discussed with patient possible dx, treatment options, LERMA vs surgery vs meds. Discussed thyroid and SE of meds and LERMA. Methimazole may cause significant bone marrow depression; the most severe manifestation is agranulocytosis. May also cause Hepatotoxicity (including acute liver failure) Symptoms suggestive of hepatic dysfunction (eg, anorexia, pruritus, right upper quadrant pain) should prompt evaluation. If you have nausea, vomiting, abdominal pain, jaundice- yellow skin or eye color-, dark urine, light stool color, fever, sore throat or infection, call us or the PCP.  Remission rate of ~ 40% with use of antithyroid drugs for 1-1.5 years, their side effects, monitoring, and follow-up issues, specifically agranulocytosis, liver toxicity, anaphylaxis, rash, lupus like syndrome, metallic taste in the mouth, and joint aches. We discussed that patient should discontinue methimazole at the earliest sign of a fever, sore throat, or other infection, should call our office and have WBC count with differential done. The drug should be held until the result is available.                PAST MEDICAL HISTORY:   Past Medical History:    Graves disease    High blood pressure    History of blood clots    Bllod clotnin lung    Morbid obesity with BMI of 45.0-49.9, adult (HCC)    Obesity    PONV (postoperative nausea and vomiting)    Pulmonary embolism (HCC)    Unspecified essential hypertension    Visual impairment    contacts and glasses       PAST SURGICAL HISTORY:   Past Surgical History:   Procedure Laterality Date          x3    Cholecystectomy  3/2023    Colonoscopy  2017    Endometrial ablation      Hernia surgery      inguinal hernai repair    Hysterectomy      Hysteroscopy,with endometrial  10/01/2006    Oophorectomy      Removal gallbladder      Total abdominal hysterectomy      Tubal ligation  2004       CURRENT MEDICATIONS:     [START ON 2025] methIMAzole 5 MG Oral Tab Take 1 tablet (5 mg  total) by mouth in the morning and 1 tablet (5 mg total) before bedtime. 180 tablet 0    Potassium Chloride ER 10 MEQ Oral Cap CR Take 1 capsule (10 mEq total) by mouth 2 (two) times daily. 180 capsule 3    HYDROCHLOROTHIAZIDE 25 MG Oral Tab TAKE 1 TABLET(25 MG) BY MOUTH DAILY 90 tablet 1    fluticasone-salmeterol 115-21 MCG/ACT Inhalation Aerosol Inhale 1 puff into the lungs 2 (two) times daily. 1 each 3    Cholecalciferol (VITAMIN D) 50 MCG (2000 UT) Oral Cap Take by mouth.         ALLERGIES:  Allergies   Allergen Reactions    Neosporin Af [Miconazole Nitrate] RASH     CRREA-blisters    Sulfa Antibiotics OTHER (SEE COMMENTS)    Neomycin RASH    Sulfur-Salicylic Acid [Salicylic Acid-Sulfur] OTHER (SEE COMMENTS)     Cause the skin to bubble.       SOCIAL HISTORY:    Social History     Socioeconomic History    Marital status:    Tobacco Use    Smoking status: Never     Passive exposure: Past    Smokeless tobacco: Never   Vaping Use    Vaping status: Never Used   Substance and Sexual Activity    Alcohol use: Not Currently     Alcohol/week: 1.0 standard drink of alcohol    Drug use: No    Sexual activity: Yes   Other Topics Concern    Exercise No   Teacher   Smoking no   Marijuana no  Etoh no  Drugs no  FAMILY HISTORY:   Family History   Problem Relation Age of Onset    Other (CAD) Father     Other (HTN) Maternal Grandfather     Other (HTN) Mother     Cancer Mother     Hypertension Mother    No autoimmune disease       PHYSICAL EXAM:   Height: 5' 1\" (154.9 cm) (03/18 1542)  Weight: 255 lb (115.7 kg) (03/18 1542)  BSA (Calculated - sq m): 2.09 sq meters (03/18 1542)  Pulse: 88 (03/18 1542)  BP: 132/90 (03/18 1542)  Temp: 98.4 °F (36.9 °C) (03/18 1542)  Do Not Use - Resp Rate: --  SpO2: --    Body mass index is 48.18 kg/m².  Rt ear tympanic membrane congestion   No tenderness   Enlarged thyroid     DATA:     Pertinent data reviewed       Latest Reference Range & Units 03/15/25 08:25   T4,Free (Direct) 0.8 - 1.7  ng/dL 0.6 (L)   (L): Data is abnormally low   Latest Reference Range & Units 08/23/24 16:47   Thy Stim Immuno 0.00 - 0.55 IU/L 0.44      06/29/24 14:40   US THYROID (CPT=76536)  Thyromegaly.  No discrete nodules identified.        Rpt: View report in Results Review for more information   06/25/24 16:36   CT ANGIOGRAPHY, CHEST (CPT=71275) Rpt      Latest Reference Range & Units 10/25/22 07:34 10/25/22 07:35 10/27/22 10:19 12/10/22 07:35 01/24/23 16:01   T4,Free (Direct) 0.8 - 1.7 ng/dL 6.5 (H)  4.5 (H) 0.5 (L) 0.2 (L)   TSH 0.358 - 3.740 mIU/mL <0.005 (L)  <0.005 (L) <0.005 (L) 25.300 (H)   T3 TOTAL 60 - 181 ng/dL  363 (H)      T3 FREE 2.40 - 4.20 pg/mL     1.53 (L)   THYROID STIMULATING IMMUNOGLOB <=0.54 IU/L 7.06 (H)             No results for input(s): \"TSH\", \"T4F\", \"T3F\", \"THYP\" in the last 72 hours.    No results found.    Orders Placed This Encounter   Procedures    CBC With Differential With Platelet    Comp Metabolic Panel (14)    TSH W Reflex To Free T4     Orders Placed This Encounter    CBC With Differential With Platelet     Order Specific Question:   Release to patient     Answer:   Immediate    Comp Metabolic Panel (14)     Order Specific Question:   Release to patient     Answer:   Immediate    TSH W Reflex To Free T4     Standing Status:   Future     Number of Occurrences:   1     Standing Expiration Date:   3/18/2026     Order Specific Question:   Release to patient     Answer:   Immediate    methIMAzole 5 MG Oral Tab     Sig: Take 1 tablet (5 mg total) by mouth in the morning and 1 tablet (5 mg total) before bedtime.     Dispense:  180 tablet     Refill:  0     **Patient requests 90 days supply**          This is a specialized patient consultation in endocrinology and required comprehensive review of prior records, as well as current evaluation, with time required for consideration of complex endocrine issues and consultation. For this visit, I personally interviewed the patient, and family member  if accompanied, performed the pertinent parts of the history and physical examination. ROS included screening for appropriate endocrine conditions.   Today's diagnosis and plan were reviewed in detail with the patient who states understanding and agrees with plan. I discussed with the patient possible diagnosis, differential diagnosis, need for work up, treatment options, alternatives and side effects.     Please see note for details about time spent which includes:   · pre-visit preparation  · reviewing records  · face to face time with the patient   · timely documentation of the encounter  · ordering medications/tests  · communication with care team  · care coordination    I appreciate the opportunity to be part of your patient's medical care and will keep you, as the referring and primary physicians, informed about the care of your patient. Please feel free to contact me should you have any questions.    The 21st Century Cures Act makes medical notes like these available to patients in the interest of transparency. Please be advised this is a medical document. Medical documents are intended to carry relevant information, facts as evident, and the clinical opinion of the practitioner. The medical note is intended as peer to peer communication and may appear blunt or direct. It is written in medical language and may contain abbreviations or verbiage that are unfamiliar.   Sandra Madison MD

## 2025-03-18 NOTE — PATIENT INSTRUCTIONS
Labs today - CMP, CBC, no fever today   Labs and follow up in 1-2 mo     Will decrease methimazole to 5 mg twice a day but today and tomorrow take one tablet only. Will consider ENT consult.     Discussed with patient possible dx, treatment options, LERMA vs surgery vs meds. Discussed thyroid and SE of meds and LERMA. Methimazole may cause significant bone marrow depression; the most severe manifestation is agranulocytosis. May also cause Hepatotoxicity (including acute liver failure) Symptoms suggestive of hepatic dysfunction (eg, anorexia, pruritus, right upper quadrant pain) should prompt evaluation. If you have nausea, vomiting, abdominal pain, jaundice- yellow skin or eye color-, dark urine, light stool color, fever, sore throat or infection, call us or the PCP.  Remission rate of ~ 40% with use of antithyroid drugs for 1-1.5 years, their side effects, monitoring, and follow-up issues, specifically agranulocytosis, liver toxicity, anaphylaxis, rash, lupus like syndrome, metallic taste in the mouth, and joint aches. We discussed that patient should discontinue methimazole at the earliest sign of a fever, sore throat, or other infection, should call our office and have WBC count with differential done. The drug should be held until the result is available.

## 2025-04-02 ENCOUNTER — PATIENT MESSAGE (OUTPATIENT)
Facility: LOCATION | Age: 55
End: 2025-04-02

## 2025-04-02 DIAGNOSIS — E66.01 MORBID OBESITY (HCC): ICD-10-CM

## 2025-04-02 DIAGNOSIS — E05.90 HYPERTHYROIDISM: ICD-10-CM

## 2025-04-02 DIAGNOSIS — E05.00 GRAVES DISEASE: ICD-10-CM

## 2025-04-02 DIAGNOSIS — R05.9 COUGH, UNSPECIFIED TYPE: ICD-10-CM

## 2025-04-02 DIAGNOSIS — E07.9 THYROID DISEASE: ICD-10-CM

## 2025-04-02 DIAGNOSIS — Z90.710 S/P HYSTERECTOMY: ICD-10-CM

## 2025-04-02 DIAGNOSIS — E05.00 GRAVES' ORBITOPATHY: ICD-10-CM

## 2025-04-02 DIAGNOSIS — E78.5 HYPERLIPIDEMIA, UNSPECIFIED HYPERLIPIDEMIA TYPE: ICD-10-CM

## 2025-04-03 NOTE — TELEPHONE ENCOUNTER
She has thyroid labs pending from last visit   TSH W Reflex To Free T4        Standing Status:   Future       Number of Occurrences:   1       Standing Expiration Date:   3/18/2026       Order Specific Question:   Release to patient       Answer:   Immediate   Order Requisition    TSH W Reflex To Free T4 (Order #[id number]) on 3/18/25     TSH W Reflex To Free T4 [[id number]]    Electronically signed by: Sandra Madison MD on 03/18/25 8004 Status: Active   Ordering user: Sandra Madison MD 03/18/25 1902

## 2025-04-12 ENCOUNTER — LAB ENCOUNTER (OUTPATIENT)
Dept: LAB | Age: 55
End: 2025-04-12
Attending: INTERNAL MEDICINE
Payer: COMMERCIAL

## 2025-04-12 LAB
T4 FREE SERPL-MCNC: 1.9 NG/DL (ref 0.8–1.7)
TSI SER-ACNC: 0.01 UIU/ML (ref 0.55–4.78)

## 2025-04-12 PROCEDURE — 36415 COLL VENOUS BLD VENIPUNCTURE: CPT | Performed by: INTERNAL MEDICINE

## 2025-04-12 PROCEDURE — 84445 ASSAY OF TSI GLOBULIN: CPT | Performed by: INTERNAL MEDICINE

## 2025-04-16 LAB — THY STIM IMMUNO: 33.5 IU/L

## 2025-04-29 ENCOUNTER — OFFICE VISIT (OUTPATIENT)
Facility: LOCATION | Age: 55
End: 2025-04-29
Payer: COMMERCIAL

## 2025-04-29 DIAGNOSIS — J34.3 HYPERTROPHY OF NASAL TURBINATES: ICD-10-CM

## 2025-04-29 DIAGNOSIS — E05.00 GRAVES DISEASE: Primary | ICD-10-CM

## 2025-04-29 DIAGNOSIS — K21.9 LARYNGOPHARYNGEAL REFLUX (LPR): ICD-10-CM

## 2025-04-29 DIAGNOSIS — J30.1 ALLERGIC RHINITIS DUE TO POLLEN, UNSPECIFIED SEASONALITY: ICD-10-CM

## 2025-04-29 PROCEDURE — 99213 OFFICE O/P EST LOW 20 MIN: CPT | Performed by: STUDENT IN AN ORGANIZED HEALTH CARE EDUCATION/TRAINING PROGRAM

## 2025-04-29 PROCEDURE — 31575 DIAGNOSTIC LARYNGOSCOPY: CPT | Performed by: STUDENT IN AN ORGANIZED HEALTH CARE EDUCATION/TRAINING PROGRAM

## 2025-04-29 RX ORDER — BUDESONIDE 0.5 MG/2ML
INHALANT ORAL
Qty: 120 ML | Refills: 3 | Status: SHIPPED | OUTPATIENT
Start: 2025-04-29

## 2025-04-29 NOTE — PROGRESS NOTES
[facility]  OTOLARYNGOLOGY - HEAD & NECK SURGERY    4/29/2025     Reason for Consultation:   Chronic cough    History of Present Illness:   Patient is a pleasant 55 year old female who is being seen for chronic cough which she developed after having COVID in 2022.  The patient states that prior to this she did not have any problems with chronic cough.  She does not have any history of asthma.  Since her COVID infection she has had chronic cough which has fluctuated.  At times it does bother her at night and at times she does not have an issue at night.  She has been seen by pulmonologist and was started on Atrovent nasal spray.  She was also seen by her endocrinologist  and was prescribed pantoprazole which she has not started yet.  She does have a history of allergic rhinitis but states that her cough does not correlate with her allergy symptoms.  She does not have any overt symptoms of gastroesophageal reflux.  She states that sometimes the cough is dry and at times it can be productive.    INTERVAL HISTORY  4/29/2025: The patient returns today to discuss her thyroid issue.  She does have a history of Graves' disease and has been on methimazole.  She states that she has had difficulty managing her levels.  She is considering her options at the moment.  She states that her thyroid gland seems to swell and shrink, and when it is swollen she does have compressive symptoms.  Additionally she has had issues with her voice.  States that she has time periods where her voice goes in and out.  Has been using the nasal sprays and states that her cough is significantly improved.  Also has been having some discomfort in her right ear and the sensation of pain in the infra-auricular area    Past Medical History  Past Medical History:    Graves disease    High blood pressure    History of blood clots    Bllod clotnin lung    Morbid obesity with BMI of 45.0-49.9, adult (HCC)    Obesity    PONV (postoperative nausea and  vomiting)    Pulmonary embolism (HCC)    Unspecified essential hypertension    Visual impairment    contacts and glasses       Past Surgical History  Past Surgical History:   Procedure Laterality Date          x3    Cholecystectomy  3/2023    Colonoscopy  2017    Endometrial ablation      Hernia surgery      inguinal hernai repair    Hysterectomy      Hysteroscopy,with endometrial  10/01/2006    Oophorectomy      Removal gallbladder      Total abdominal hysterectomy      Tubal ligation  2004       Family History  Family History   Problem Relation Age of Onset    Other (CAD) Father     Other (HTN) Maternal Grandfather     Other (HTN) Mother     Cancer Mother     Hypertension Mother        Social History  Pediatric History   Patient Parents    Ashley, Maricarmen (Mother)     Other Topics Concern     Service Not Asked    Blood Transfusions Not Asked    Caffeine Concern Not Asked    Occupational Exposure Not Asked    Hobby Hazards Not Asked    Sleep Concern Not Asked    Stress Concern Not Asked    Weight Concern Not Asked    Special Diet Not Asked    Back Care Not Asked    Exercise No    Bike Helmet Not Asked    Seat Belt Not Asked    Self-Exams Not Asked   Social History Narrative    Not on file           Current Medications:  Current Outpatient Medications   Medication Sig Dispense Refill    [START ON 2025] methIMAzole 5 MG Oral Tab Take 1 tablet (5 mg total) by mouth in the morning and 1 tablet (5 mg total) before bedtime. 180 tablet 0    fluticasone-salmeterol 250-50 MCG/ACT Inhalation Aerosol Powder, Breath Activated Inhale 1 puff into the lungs 2 (two) times daily. 180 each 1    Potassium Chloride ER 10 MEQ Oral Cap CR Take 1 capsule (10 mEq total) by mouth 2 (two) times daily. 180 capsule 3    HYDROCHLOROTHIAZIDE 25 MG Oral Tab TAKE 1 TABLET(25 MG) BY MOUTH DAILY 90 tablet 1    Cholecalciferol (VITAMIN D) 50 MCG (2000 UT) Oral Cap Take by mouth.      benzonatate 100 MG Oral Cap Take  1 capsule (100 mg total) by mouth 3 (three) times daily as needed for cough. (Patient not taking: Reported on 3/1/2025) 20 capsule 0    fluticasone-salmeterol 115-21 MCG/ACT Inhalation Aerosol Inhale 1 puff into the lungs 2 (two) times daily. 1 each 3    pantoprazole 40 MG Oral Tab EC Take 1 tablet (40 mg total) by mouth every morning before breakfast. (Patient not taking: Reported on 3/1/2025) 90 tablet 3       Allergies  Allergies   Allergen Reactions    Neosporin Af [Miconazole Nitrate] RASH     CRREA-blisters    Sulfa Antibiotics OTHER (SEE COMMENTS)    Neomycin RASH    Sulfur-Salicylic Acid [Salicylic Acid-Sulfur] OTHER (SEE COMMENTS)     Cause the skin to bubble.       Review of Systems:   A comprehensive 10 point review of systems was completed.  Pertinent positives and negatives noted in the the HPI.    Physical Exam:   Last menstrual period 01/23/2017, not currently breastfeeding.    GENERAL: No acute distress, Comfortable appearing  FACE: HB 1/6, Normal Animation  HEAD: Normocephalic  EYES: EOMI, pupils equil  EARS: Bilateral Auricles Symmetric, bilateral tympanic membranes appear normal  NOSE: Nares patent bilaterally  ORAL CAVITY: Tongue mobile, Oropharynx clear, Floor of mouth clear, Posterior oropharynx normal  NECK: No palpable lymphadenopathy, thyroid not palpable, nontender    PROCEDURE: FLEXIBLE FIBEROPTIC LARYNGOSCOPY (90309) -as performed on 4/29/2025    Due to inability for adequate examination of the larynx and need for magnification to perform the examination, endoscopy was performed.  Risks and benefits were discussed with patient/family and they have given verbal consent to proceed.    Procedure Detail & Findings:     After placement of topical anesthetic intranasally the flexible laryngoscope was inserted into the nare and driven through the nasal cavity with no significant abnormal findings noted in the nasal cavities or nasopharynx. The oropharynx, hypopharynx and larynx were subsequently  examined and the following findings were noted:    Nasal cavity and nasopharynx: The bilateral inferior turbinates appear to have pale edema, and the sinus cavities appear to have postoperative changes and are patent.  There are no obvious polyps noted.  There is a significant amount of postnasal drip noted along the turbinates and down through the nasopharynx.  There is also significant cobblestoning noted of the nasopharyngeal mucosa    Base of Tongue: Normal    Valeculla: Normal    Arytenoids: Normal    Introitus of the esophagus: There is still persistent moderate edema noted    Epiglottis: Normal    False vocal cords: Normal    True vocal cords: Normal    Subglottic space: Normal    Mobility of True vocal cords: Normal    Condition: Stable    Complications: Patient tolerated the procedure well with no immediate complication.      Results:     Laboratory Data:  Lab Results   Component Value Date    WBC 7.1 03/18/2025    HGB 12.6 03/18/2025    HCT 37.8 03/18/2025    .0 03/18/2025    CREATSERUM 0.84 03/18/2025    BUN 15 03/18/2025     03/18/2025    K 3.6 03/18/2025     03/18/2025    CO2 32.0 03/18/2025    GLU 92 03/18/2025    CA 9.6 03/18/2025    ALB 4.2 03/18/2025    ALKPHO 95 03/18/2025    TP 7.7 03/18/2025    AST 15 03/18/2025    ALT <7 (L) 03/18/2025    PTT 32.4 06/25/2024    INR 0.92 06/25/2024    PTP 12.4 06/25/2024    T4F 1.9 (H) 04/12/2025    TSH 0.014 (L) 04/12/2025    DELPHINE 158 (H) 03/12/2017    LIP 36 11/02/2024    DDIMER 0.61 (H) 06/25/2024    ESRML 59 (H) 10/25/2017    CRP 1.86 (H) 10/25/2017    TROP <0.046 10/01/2017    CK 69 01/18/2025    B12 702 04/15/2021         Imaging:  No results found.      Impression:       ICD-10-CM    1. Laryngopharyngeal reflux (LPR)  K21.9       2. Hypertrophy of nasal turbinates  J34.3       3. Allergic rhinitis due to pollen, unspecified seasonality  J30.1       4. Chronic cough  R05.3           Recommendations:  She will continue azelastine twice  daily.  I would also like her to try nasal saline rinses with budesonide. In terms of her graves disease I did discuss surgery with her but she is hesitant to pursue surgery.  She would like to continue medical management at the moment and if things change on the line she will reconsider.    Thank you for allowing me to participate in the care of your patient.    Arash Ken, DO   Otolaryngology/Rhinology, Sinus, and Endoscopic Skull Base Surgery  [address] [place] [id number]  Phone [phone]  Fax [phone]  9/4/2024  7:49 PM  4/29/2025

## 2025-05-22 ENCOUNTER — LAB ENCOUNTER (OUTPATIENT)
Dept: LAB | Age: 55
End: 2025-05-22
Attending: INTERNAL MEDICINE
Payer: COMMERCIAL

## 2025-05-22 ENCOUNTER — OFFICE VISIT (OUTPATIENT)
Facility: LOCATION | Age: 55
End: 2025-05-22
Payer: COMMERCIAL

## 2025-05-22 VITALS
SYSTOLIC BLOOD PRESSURE: 134 MMHG | BODY MASS INDEX: 47.58 KG/M2 | HEIGHT: 61 IN | WEIGHT: 252 LBS | HEART RATE: 58 BPM | DIASTOLIC BLOOD PRESSURE: 78 MMHG

## 2025-05-22 DIAGNOSIS — E05.90 HYPERTHYROIDISM: ICD-10-CM

## 2025-05-22 DIAGNOSIS — Z90.710 S/P HYSTERECTOMY: ICD-10-CM

## 2025-05-22 DIAGNOSIS — E05.00 GRAVES' ORBITOPATHY: ICD-10-CM

## 2025-05-22 DIAGNOSIS — E05.00 GRAVES DISEASE: Primary | ICD-10-CM

## 2025-05-22 DIAGNOSIS — E07.9 THYROID DISEASE: ICD-10-CM

## 2025-05-22 DIAGNOSIS — K59.00 CONSTIPATION, UNSPECIFIED CONSTIPATION TYPE: ICD-10-CM

## 2025-05-22 DIAGNOSIS — E78.5 HYPERLIPIDEMIA, UNSPECIFIED HYPERLIPIDEMIA TYPE: ICD-10-CM

## 2025-05-22 LAB
T4 FREE SERPL-MCNC: 0.9 NG/DL (ref 0.8–1.7)
TSI SER-ACNC: 0.01 UIU/ML (ref 0.55–4.78)

## 2025-05-22 PROCEDURE — 84439 ASSAY OF FREE THYROXINE: CPT | Performed by: INTERNAL MEDICINE

## 2025-05-22 PROCEDURE — 36415 COLL VENOUS BLD VENIPUNCTURE: CPT | Performed by: INTERNAL MEDICINE

## 2025-05-22 PROCEDURE — 3078F DIAST BP <80 MM HG: CPT | Performed by: INTERNAL MEDICINE

## 2025-05-22 PROCEDURE — 84443 ASSAY THYROID STIM HORMONE: CPT | Performed by: INTERNAL MEDICINE

## 2025-05-22 PROCEDURE — 3008F BODY MASS INDEX DOCD: CPT | Performed by: INTERNAL MEDICINE

## 2025-05-22 PROCEDURE — 99214 OFFICE O/P EST MOD 30 MIN: CPT | Performed by: INTERNAL MEDICINE

## 2025-05-22 PROCEDURE — 3075F SYST BP GE 130 - 139MM HG: CPT | Performed by: INTERNAL MEDICINE

## 2025-05-22 RX ORDER — METHIMAZOLE 5 MG/1
5 TABLET ORAL 2 TIMES DAILY
Qty: 180 TABLET | Refills: 0 | Status: SHIPPED | OUTPATIENT
Start: 2025-05-22

## 2025-05-22 RX ORDER — DOCUSATE SODIUM 100 MG/1
100 CAPSULE, LIQUID FILLED ORAL 2 TIMES DAILY
Qty: 180 CAPSULE | Refills: 0 | Status: SHIPPED | OUTPATIENT
Start: 2025-05-22 | End: 2025-08-20

## 2025-05-22 NOTE — PATIENT INSTRUCTIONS
Labs today    Labs and follow up in 1-2 mo     Will reassess methimazole    methimazole  5 mg twice a day now     Discussed with patient possible dx, treatment options, LERMA vs surgery vs meds. Discussed thyroid and SE of meds and LERMA. Methimazole may cause significant bone marrow depression; the most severe manifestation is agranulocytosis. May also cause Hepatotoxicity (including acute liver failure) Symptoms suggestive of hepatic dysfunction (eg, anorexia, pruritus, right upper quadrant pain) should prompt evaluation. If you have nausea, vomiting, abdominal pain, jaundice- yellow skin or eye color-, dark urine, light stool color, fever, sore throat or infection, call us or the PCP.  Remission rate of ~ 40% with use of antithyroid drugs for 1-1.5 years, their side effects, monitoring, and follow-up issues, specifically agranulocytosis, liver toxicity, anaphylaxis, rash, lupus like syndrome, metallic taste in the mouth, and joint aches. We discussed that patient should discontinue methimazole at the earliest sign of a fever, sore throat, or other infection, should call our office and have WBC count with differential done. The drug should be held until the result is available.

## 2025-05-22 NOTE — PROGRESS NOTES
Reason for Visit:  Graves goiter, HTN  Requesting Physician:   .Mikie Callaway MD    CHIEF COMPLAINT:    Chief Complaint   Patient presents with    Graves     F/u        HISTORY OF PRESENT ILLNESS:   Shannon Mann is a 55 year old female who presents with Graves, hyperthroidism , GO,  dry cough, goiter, HTN, low Vit D, and PE after covid. Was on blood thinner till 2/2023  Stopped  Zepbound after pancreatitis     Saw ENT and will follow up soon. Will discuss surgery for Graves.   She feels thyroid is enlarged. Has more snoring now.    Sheis on MMI 5 mg bid - dose was decreased in March from  MMI 5 mg TID.    March FT4 levels were 0.6-> 1.9 in April   She  felt shaky in April when FT4 was high     Graves was Dx in 2017. Started MMI higher doses then titrated to MMI 5 mg/day. was in  remission in 2019.Relapse in 2022. High TSI 10/2022. TSI is normal 8/2024 with negative TSI. Relapsed 12/2024 again     Menstrual hx: ln 2017, had HYS   Neck surgery: No  Neck radiation: No   Biotin: no  Turmeric:no        04/12/25 10:14   Thy Stim Immuno  33.50 (H)        ASSESSMENT AND PLAN:  55 year old female who presents with Graves, hyperthroidism , GO,  dry cough, goiter, HTN, PE, had pancreatitis 11/2024 stop zepbound  and low Vit D  Do not take GLP-1a in the future   On MMI 5 mg bid now since 3/2025  Clinically nonspecific symptoms but can be d/t thyroid    Biochemically, pending FT4   High TSI in 4/2025     Plan     Labs today    Labs and follow up in 1-2 mo     Will reassess methimazole    methimazole  5 mg twice a day now     Discussed with patient possible dx, treatment options, LERMA vs surgery vs meds. Discussed thyroid and SE of meds and LERMA. Methimazole may cause significant bone marrow depression; the most severe manifestation is agranulocytosis. May also cause Hepatotoxicity (including acute liver failure) Symptoms suggestive of hepatic dysfunction (eg, anorexia, pruritus, right upper quadrant pain) should prompt  evaluation. If you have nausea, vomiting, abdominal pain, jaundice- yellow skin or eye color-, dark urine, light stool color, fever, sore throat or infection, call us or the PCP.  Remission rate of ~ 40% with use of antithyroid drugs for 1-1.5 years, their side effects, monitoring, and follow-up issues, specifically agranulocytosis, liver toxicity, anaphylaxis, rash, lupus like syndrome, metallic taste in the mouth, and joint aches. We discussed that patient should discontinue methimazole at the earliest sign of a fever, sore throat, or other infection, should call our office and have WBC count with differential done. The drug should be held until the result is available.                PAST MEDICAL HISTORY:   Past Medical History:    Graves disease    High blood pressure    History of blood clots    Bllod clotnin lung    Morbid obesity with BMI of 45.0-49.9, adult (HCC)    Obesity    PONV (postoperative nausea and vomiting)    Pulmonary embolism (HCC)    Unspecified essential hypertension    Visual impairment    contacts and glasses       PAST SURGICAL HISTORY:   Past Surgical History:   Procedure Laterality Date          x3    Cholecystectomy  3/2023    Colonoscopy  2017    Endometrial ablation      Hernia surgery      inguinal hernai repair    Hysterectomy      Hysteroscopy,with endometrial  10/01/2006    Oophorectomy      Removal gallbladder      Total abdominal hysterectomy      Tubal ligation  2004       CURRENT MEDICATIONS:     methIMAzole 5 MG Oral Tab Take 1 tablet (5 mg total) by mouth in the morning and 1 tablet (5 mg total) before bedtime. 180 tablet 0    docusate sodium 100 MG Oral Cap Take 1 capsule (100 mg total) by mouth 2 (two) times daily. 180 capsule 0    HYDROCHLOROTHIAZIDE 25 MG Oral Tab TAKE 1 TABLET(25 MG) BY MOUTH DAILY 90 tablet 1    Cholecalciferol (VITAMIN D) 50 MCG ( UT) Oral Cap Take by mouth.         ALLERGIES:  Allergies   Allergen Reactions    Neosporin Af  [Miconazole Nitrate] RASH     CRREA-blisters    Sulfa Antibiotics OTHER (SEE COMMENTS)    Neomycin RASH    Sulfur-Salicylic Acid [Salicylic Acid-Sulfur] OTHER (SEE COMMENTS)     Cause the skin to bubble.       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  Enlarged thyroid     DATA:     Pertinent data reviewed       Latest Reference Range & Units 03/15/25 08:25   T4,Free (Direct) 0.8 - 1.7 ng/dL 0.6 (L)   (L): Data is abnormally low   Latest Reference Range & Units 08/23/24 16:47   Thy Stim Immuno 0.00 - 0.55 IU/L 0.44      06/29/24 14:40   US THYROID (CPT=76536)  Thyromegaly.  No discrete nodules identified.        Rpt: View report in Results Review for more information   06/25/24 16:36   CT ANGIOGRAPHY, CHEST (CPT=71275) Rpt      Latest Reference Range & Units 10/25/22 07:34 10/25/22 07:35 10/27/22 10:19 12/10/22 07:35 01/24/23 16:01   T4,Free (Direct) 0.8 - 1.7 ng/dL 6.5 (H)  4.5 (H) 0.5 (L) 0.2 (L)   TSH 0.358 - 3.740 mIU/mL <0.005 (L)  <0.005 (L) <0.005 (L) 25.300 (H)   T3 TOTAL 60 - 181 ng/dL  363 (H)       T3 FREE 2.40 - 4.20 pg/mL     1.53 (L)   THYROID STIMULATING IMMUNOGLOB <=0.54 IU/L 7.06 (H)             No results for input(s): \"TSH\", \"T4F\", \"T3F\", \"THYP\" in the last 72 hours.    No results found.    Orders Placed This Encounter   Procedures    TSH and Free T4    TSH W Reflex To Free T4     Orders Placed This Encounter    TSH and Free T4     Release to patient:   Immediate    TSH W Reflex To Free T4     Standing Status:   Future     Number of Occurrences:   1     Expected Date:   6/22/2025     Expiration Date:   5/22/2026     Release to patient:   Immediate    methIMAzole 5 MG Oral Tab     Sig: Take 1 tablet (5 mg total) by mouth in the morning and 1 tablet (5 mg total) before bedtime.     Dispense:  180 tablet     Refill:  0     **Patient requests 90 days supply**    docusate sodium 100 MG Oral Cap     Sig: Take 1 capsule (100 mg total) by mouth 2 (two) times daily.     Dispense:  180 capsule     Refill:  0          This is a specialized patient consultation in endocrinology and required comprehensive review of prior records, as well as current evaluation, with time required for consideration of complex endocrine issues and consultation. For this visit, I personally interviewed the patient, and family member if accompanied, performed the pertinent parts of the history and physical examination. ROS included screening for appropriate endocrine conditions.   Today's diagnosis and plan were reviewed in detail with the patient who states understanding and agrees with plan. I discussed with the patient possible diagnosis, differential diagnosis, need for work up, treatment options, alternatives and side effects.     Please see note for details about time spent which includes:   · pre-visit preparation  · reviewing records  · face to face time with the patient   · timely documentation of the encounter  · ordering medications/tests  · communication with care team  · care coordination    I appreciate the opportunity  to be part of your patient's medical care and will keep you, as the referring and primary physicians, informed about the care of your patient. Please feel free to contact me should you have any questions.    The 21st Century Cures Act makes medical notes like these available to patients in the interest of transparency. Please be advised this is a medical document. Medical documents are intended to carry relevant information, facts as evident, and the clinical opinion of the practitioner. The medical note is intended as peer to peer communication and may appear blunt or direct. It is written in medical language and may contain abbreviations or verbiage that are unfamiliar.   Sandra Madison MD

## 2025-05-29 ENCOUNTER — APPOINTMENT (OUTPATIENT)
Dept: GENERAL RADIOLOGY | Age: 55
End: 2025-05-29
Attending: NURSE PRACTITIONER
Payer: COMMERCIAL

## 2025-05-29 ENCOUNTER — HOSPITAL ENCOUNTER (OUTPATIENT)
Age: 55
Discharge: HOME | End: 2025-05-29
Payer: COMMERCIAL

## 2025-05-29 VITALS
RESPIRATION RATE: 18 BRPM | OXYGEN SATURATION: 97 % | SYSTOLIC BLOOD PRESSURE: 148 MMHG | DIASTOLIC BLOOD PRESSURE: 84 MMHG | HEART RATE: 83 BPM | TEMPERATURE: 99 F

## 2025-05-29 DIAGNOSIS — R05.1 ACUTE COUGH: ICD-10-CM

## 2025-05-29 DIAGNOSIS — J22 LRTI (LOWER RESPIRATORY TRACT INFECTION): Primary | ICD-10-CM

## 2025-05-29 LAB — SARS-COV-2 RNA RESP QL NAA+PROBE: NOT DETECTED

## 2025-05-29 PROCEDURE — 94640 AIRWAY INHALATION TREATMENT: CPT

## 2025-05-29 PROCEDURE — 99214 OFFICE O/P EST MOD 30 MIN: CPT

## 2025-05-29 PROCEDURE — 71046 X-RAY EXAM CHEST 2 VIEWS: CPT | Performed by: NURSE PRACTITIONER

## 2025-05-29 RX ORDER — IPRATROPIUM BROMIDE AND ALBUTEROL SULFATE 2.5; .5 MG/3ML; MG/3ML
3 SOLUTION RESPIRATORY (INHALATION) ONCE
Status: COMPLETED | OUTPATIENT
Start: 2025-05-29 | End: 2025-05-29

## 2025-05-29 RX ORDER — PREDNISONE 20 MG/1
40 TABLET ORAL ONCE
Status: COMPLETED | OUTPATIENT
Start: 2025-05-29 | End: 2025-05-29

## 2025-05-29 RX ORDER — BENZONATATE 100 MG/1
100 CAPSULE ORAL 3 TIMES DAILY PRN
Qty: 30 CAPSULE | Refills: 0 | Status: SHIPPED | OUTPATIENT
Start: 2025-05-29 | End: 2025-06-28

## 2025-05-29 RX ORDER — PREDNISONE 20 MG/1
40 TABLET ORAL DAILY
Qty: 6 TABLET | Refills: 0 | Status: SHIPPED | OUTPATIENT
Start: 2025-05-29 | End: 2025-06-01

## 2025-05-29 RX ORDER — AZITHROMYCIN 250 MG/1
TABLET, FILM COATED ORAL
Qty: 6 TABLET | Refills: 0 | Status: SHIPPED | OUTPATIENT
Start: 2025-05-29 | End: 2025-06-03

## 2025-05-29 RX ORDER — AMOXICILLIN 500 MG/1
1000 TABLET, FILM COATED ORAL 3 TIMES DAILY
Qty: 30 TABLET | Refills: 0 | Status: SHIPPED | OUTPATIENT
Start: 2025-05-29 | End: 2025-06-03

## 2025-05-29 NOTE — ED PROVIDER NOTES
Patient Seen in: [facility]       The following individual(s) verbally consented to be recorded using ambient AI listening technology and understand that they can each withdraw their consent to this listening technology at any point by asking the clinician to turn off or pause the recording:    Patient name: Shannon Mann  Additional names:  N/A      History  Chief Complaint   Patient presents with    Sinus Problem     Stated Complaint: cough    Subjective:   HPI     Shannon Mann is a 55 year old female who presents with a persistent cough, wheezing that began about a week ago. Nasal congestion and runny nose over the last 2 days.     She has had a persistent cough for about a week, occasionally producing phlegm, and causing a sore throat during coughing episodes. She experiences pressure in her ear and head, which started recently. She has a history of breathing problems, including wheezing, but has not been diagnosed with asthma. She also has Graves' disease. 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 No known pertussis exposure.     Pt denies fever, chills, hemoptysis, sinus pressure, shortness of breath, chest pain, leg swelling, nausea, vomiting or diarrhea.     Objective:     Past Medical History:    Graves disease    High blood pressure    History of blood clots    Bllod clotnin lung    Morbid obesity with BMI of 45.0-49.9, adult (HCC)    Obesity    PONV (postoperative nausea and vomiting)    Pulmonary embolism (HCC)    Unspecified essential hypertension    Visual impairment    contacts and glasses              Past Surgical History:   Procedure Laterality Date          x3    Cholecystectomy  3/2023    Colonoscopy      Endometrial ablation      Hernia surgery      inguinal hernai repair    Hysterectomy      Hysteroscopy,with endometrial  10/01/2006    Oophorectomy      Removal gallbladder      Total abdominal hysterectomy       Tubal ligation  09/01/2004                Social History     Socioeconomic History    Marital status:    Tobacco Use    Smoking status: Never     Passive exposure: Past    Smokeless tobacco: Never   Vaping Use    Vaping status: Never Used   Substance and Sexual Activity    Alcohol use: Not Currently     Alcohol/week: 1.0 standard drink of alcohol    Drug use: No    Sexual activity: Yes   Other Topics Concern    Exercise No     Social Drivers of Health      Received from [facility]              Review of Systems    Positive for stated complaint: cough  Other systems are as noted in HPI.  Constitutional and vital signs reviewed.      All other systems reviewed and negative except as noted above.      Physical Exam    ED Triage Vitals [05/29/25 1606]   BP (!) 159/96   Pulse 83   Resp 18   Temp 98.9 °F (37.2 °C)   Temp src Oral   SpO2 97 %   O2 Device None (Room air)       Current Vitals:   Vital Signs  BP: 148/84  Pulse: 83  Resp: 18  Temp: 98.9 °F (37.2 °C)  Temp src: Oral    Oxygen Therapy  SpO2: 97 %  O2 Device: None (Room air)            Physical Exam  Vitals and nursing note reviewed.   Constitutional:       General: She is not in acute distress.     Appearance: Normal appearance. She is not ill-appearing, toxic-appearing or diaphoretic.   HENT:      Head: Normocephalic.      Right Ear: Tympanic membrane normal.      Left Ear: Tympanic membrane normal.      Nose: Congestion and rhinorrhea present.      Mouth/Throat:      Mouth: Mucous membranes are moist.      Pharynx: Oropharynx is clear.   Eyes:      General: No scleral icterus.     Conjunctiva/sclera: Conjunctivae normal.   Cardiovascular:      Rate and Rhythm: Normal rate and regular rhythm.      Heart sounds: No murmur heard.  Pulmonary:      Effort: Pulmonary effort is normal. No respiratory distress.      Breath sounds: No stridor. Wheezing (scattered bilateral wheezing) and rhonchi present. No rales.   Musculoskeletal:       Cervical back: Normal range of motion and neck supple.   Lymphadenopathy:      Cervical: No cervical adenopathy.   Skin:     General: Skin is warm and dry.      Findings: No rash.   Neurological:      Mental Status: She is alert.   Psychiatric:         Mood and Affect: Mood normal.         ED Course  Labs Reviewed   RAPID SARS-COV-2 BY PCR - Normal         MDM     Assessment & Plan  Shannon Mann is a 55 year old female who presents with a persistent cough, wheezing that began about a week ago. Nasal congestion and runny nose over the last 2 days.   Diff dx include bronchitis, Covid, pneumonia, pertussis  On exam, pt is non-toxic, vitals normal, wheezing/coarse lung sounds. Post neb improved.   Covid test negative.   CXR reveals bronchitis.   Will treat for LRTI with Amoxicillin, Azithromycin, Albuterol, Prednisone x 3 days, nasal saline, Flonase, steam, push fluids.   Pt states she will likely only take the Azithromycin.   Follow up with PCP if not improving next week.   ED precautions discussed.   Pt agreeable to plan.       Medical Decision Making      Disposition and Plan     Clinical Impression:  1. LRTI (lower respiratory tract infection)    2. Acute cough         Disposition:  Discharge  5/29/2025  5:13 pm    Follow-up:  Yury Callaway MD  [address] [place] [address]  [phone]      If symptoms worsen          Medications Prescribed:  Discharge Medication List as of 5/29/2025  5:14 PM        START taking these medications    Details   azithromycin (ZITHROMAX Z-SAUD) 250 MG Oral Tab 500 mg once followed by 250 mg daily x 4 days, Normal, Disp-6 tablet, R-0      predniSONE 20 MG Oral Tab Take 2 tablets (40 mg total) by mouth daily for 3 days., Normal, Disp-6 tablet, R-0      benzonatate 100 MG Oral Cap Take 1 capsule (100 mg total) by mouth 3 (three) times daily as needed for cough., Normal, Disp-30 capsule, R-0      amoxicillin 500 MG Oral Tab Take 2 tablets (1,000 mg total) by  mouth 3 (three) times daily for 5 days., Normal, Disp-30 tablet, R-0                   Supplementary Documentation:

## 2025-05-29 NOTE — DISCHARGE INSTRUCTIONS
-Azithromycin: take as directed x 5 days.   -Amoxicillin: take 2 tabs THREE times a day for 5 days.   -Prednisone: take 2 tabs in AM with breakfast for 2 more days.   - Tessalon 1 capusule every 8 hours as needed.  - Take plain mucinex - 1200mg twice a day with a big glass of water  Albuterol: 2 puffs every 4-6 hours if needed for wheezing.   - Nasal saline - either spray (\"Ocean\" brand) or Daniel Med Sinus Rinse 3 times a day  Flonase: 2 sprays to each nostril in the AM.  - Rest and push fluids  - Hot lemon tea with honey  - Steam twice a day (either in shower or with cup of hot water and a towel over your head)     Please follow up with your primary care doctor in 1 week if not improving.    If any persistent, worsening or new/ concerning symptoms develop please go to the Emergency room.

## 2025-05-29 NOTE — ED INITIAL ASSESSMENT (HPI)
Patient arrived ambulatory to room c/o symptoms that started 1 week ago. +productive cough. No nasal congestion but c/o facial pressure/pain. No fevers. No n/v/d. Easy non labored respirations. Intermittent sore throat.

## 2025-06-06 ENCOUNTER — HOSPITAL ENCOUNTER (OUTPATIENT)
Dept: ULTRASOUND IMAGING | Age: 55
Discharge: HOME | End: 2025-06-06
Attending: STUDENT IN AN ORGANIZED HEALTH CARE EDUCATION/TRAINING PROGRAM
Payer: COMMERCIAL

## 2025-06-06 ENCOUNTER — OFFICE VISIT (OUTPATIENT)
Facility: LOCATION | Age: 55
End: 2025-06-06

## 2025-06-06 DIAGNOSIS — K21.9 LARYNGOPHARYNGEAL REFLUX (LPR): ICD-10-CM

## 2025-06-06 DIAGNOSIS — E05.00 GRAVES DISEASE: ICD-10-CM

## 2025-06-06 DIAGNOSIS — E05.00 GRAVES DISEASE: Primary | ICD-10-CM

## 2025-06-06 DIAGNOSIS — J34.3 HYPERTROPHY OF NASAL TURBINATES: ICD-10-CM

## 2025-06-06 PROCEDURE — 76536 US EXAM OF HEAD AND NECK: CPT | Performed by: STUDENT IN AN ORGANIZED HEALTH CARE EDUCATION/TRAINING PROGRAM

## 2025-06-06 PROCEDURE — 99214 OFFICE O/P EST MOD 30 MIN: CPT | Performed by: STUDENT IN AN ORGANIZED HEALTH CARE EDUCATION/TRAINING PROGRAM

## 2025-06-06 NOTE — PROGRESS NOTES
The following individual(s) verbally consented to be recorded using ambient AI listening technology and understand that they can each withdraw their consent to this listening technology at any point by asking the clinician to turn off or pause the recording:yes patient consents     Patient name: Shannon Mann  Additional names:

## 2025-06-06 NOTE — PROGRESS NOTES
[facility]  OTOLARYNGOLOGY - HEAD & NECK SURGERY    6/6/2025     Reason for Consultation:   Chronic cough    History of Present Illness:   Patient is a pleasant 55 year old female who is being seen for chronic cough which she developed after having COVID in 2022.  The patient states that prior to this she did not have any problems with chronic cough.  She does not have any history of asthma.  Since her COVID infection she has had chronic cough which has fluctuated.  At times it does bother her at night and at times she does not have an issue at night.  She has been seen by pulmonologist and was started on Atrovent nasal spray.  She was also seen by her endocrinologist  and was prescribed pantoprazole which she has not started yet.  She does have a history of allergic rhinitis but states that her cough does not correlate with her allergy symptoms.  She does not have any overt symptoms of gastroesophageal reflux.  She states that sometimes the cough is dry and at times it can be productive.    INTERVAL HISTORY  4/29/2025: The patient returns today to discuss her thyroid issue.  She does have a history of Graves' disease and has been on methimazole.  She states that she has had difficulty managing her levels.  She is considering her options at the moment.  She states that her thyroid gland seems to swell and shrink, and when it is swollen she does have compressive symptoms.  Additionally she has had issues with her voice.  States that she has time periods where her voice goes in and out.  Has been using the nasal sprays and states that her cough is significantly improved.  Also has been having some discomfort in her right ear and the sensation of pain in the infra-auricular area    6/6/2025     Shannon Mann is a 55 year old female with Graves' disease who presents with thyroid swelling and compressive symptoms.    She experiences increased neck swelling and vibrations in her neck while sleeping, which have  worsened with recent bronchitis. This has led to compressive symptoms, including back and neck discomfort. She has difficulty regulating her thyroid medication dosage, with doses being either too high or too low. Her bronchitis symptoms, including nasal mucus, have improved with treatment, but she remains on antibiotics, an inhaler, and cough medicine.      Past Medical History  Past Medical History:    Graves disease    High blood pressure    History of blood clots    Bllod clotnin lung    Morbid obesity with BMI of 45.0-49.9, adult (HCC)    Obesity    PONV (postoperative nausea and vomiting)    Pulmonary embolism (HCC)    Unspecified essential hypertension    Visual impairment    contacts and glasses       Past Surgical History  Past Surgical History:   Procedure Laterality Date          x3    Cholecystectomy  3/2023    Colonoscopy  2017    Endometrial ablation      Hernia surgery      inguinal hernai repair    Hysterectomy      Hysteroscopy,with endometrial  10/01/2006    Oophorectomy      Removal gallbladder      Total abdominal hysterectomy      Tubal ligation  2004       Family History  Family History   Problem Relation Age of Onset    Other (CAD) Father     Other (HTN) Maternal Grandfather     Other (HTN) Mother     Cancer Mother     Hypertension Mother        Social History  Pediatric History   Patient Parents    Ashley, Maricarmen (Mother)     Other Topics Concern     Service Not Asked    Blood Transfusions Not Asked    Caffeine Concern Not Asked    Occupational Exposure Not Asked    Hobby Hazards Not Asked    Sleep Concern Not Asked    Stress Concern Not Asked    Weight Concern Not Asked    Special Diet Not Asked    Back Care Not Asked    Exercise No    Bike Helmet Not Asked    Seat Belt Not Asked    Self-Exams Not Asked   Social History Narrative    Not on file           Current Medications:  Current Outpatient Medications   Medication Sig Dispense Refill    benzonatate 100 MG  Oral Cap Take 1 capsule (100 mg total) by mouth 3 (three) times daily as needed for cough. 30 capsule 0    methIMAzole 5 MG Oral Tab Take 1 tablet (5 mg total) by mouth in the morning and 1 tablet (5 mg total) before bedtime. 180 tablet 0    docusate sodium 100 MG Oral Cap Take 1 capsule (100 mg total) by mouth 2 (two) times daily. (Patient not taking: Reported on 5/29/2025) 180 capsule 0    budesonide 0.5 MG/2ML Inhalation Suspension Add to neilmed sinus rinse bottle along with distilled water to the line. Use half the bottle to irrigate one nasal cavity, and the other half to irrigate the other nasal cavity. Do this twice a day (Patient not taking: Reported on 5/29/2025) 120 mL 3    fluticasone-salmeterol 250-50 MCG/ACT Inhalation Aerosol Powder, Breath Activated Inhale 1 puff into the lungs 2 (two) times daily. 180 each 1    Potassium Chloride ER 10 MEQ Oral Cap CR Take 1 capsule (10 mEq total) by mouth 2 (two) times daily. 180 capsule 3    HYDROCHLOROTHIAZIDE 25 MG Oral Tab TAKE 1 TABLET(25 MG) BY MOUTH DAILY 90 tablet 1    Cholecalciferol (VITAMIN D) 50 MCG (2000 UT) Oral Cap Take by mouth.         Allergies  Allergies   Allergen Reactions    Neosporin Af [Miconazole Nitrate] RASH     CRREA-blisters    Sulfa Antibiotics OTHER (SEE COMMENTS)    Neomycin RASH    Sulfur-Salicylic Acid [Salicylic Acid-Sulfur] OTHER (SEE COMMENTS)     Cause the skin to bubble.       Review of Systems:   A comprehensive 10 point review of systems was completed.  Pertinent positives and negatives noted in the the HPI.    Physical Exam:   Last menstrual period 01/23/2017, not currently breastfeeding.    GENERAL: No acute distress, Comfortable appearing  FACE: HB 1/6, Normal Animation  HEAD: Normocephalic  EYES: EOMI, pupils equil  EARS: Bilateral Auricles Symmetric, bilateral tympanic membranes appear normal  NOSE: Nares patent bilaterally  ORAL CAVITY: Tongue mobile, Oropharynx clear, Floor of mouth clear, Posterior oropharynx  normal  NECK: No palpable lymphadenopathy, thyroid not palpable, nontender    PROCEDURE: FLEXIBLE FIBEROPTIC LARYNGOSCOPY (83136) -as performed on 4/29/2025    Due to inability for adequate examination of the larynx and need for magnification to perform the examination, endoscopy was performed.  Risks and benefits were discussed with patient/family and they have given verbal consent to proceed.    Procedure Detail & Findings:     After placement of topical anesthetic intranasally the flexible laryngoscope was inserted into the nare and driven through the nasal cavity with no significant abnormal findings noted in the nasal cavities or nasopharynx. The oropharynx, hypopharynx and larynx were subsequently examined and the following findings were noted:    Nasal cavity and nasopharynx: The bilateral inferior turbinates appear to have pale edema, and the sinus cavities appear to have postoperative changes and are patent.  There are no obvious polyps noted.  There is a significant amount of postnasal drip noted along the turbinates and down through the nasopharynx.  There is also significant cobblestoning noted of the nasopharyngeal mucosa    Base of Tongue: Normal    Valeculla: Normal    Arytenoids: Normal    Introitus of the esophagus: There is still persistent moderate edema noted    Epiglottis: Normal    False vocal cords: Normal    True vocal cords: Normal    Subglottic space: Normal    Mobility of True vocal cords: Normal    Condition: Stable    Complications: Patient tolerated the procedure well with no immediate complication.      Results:     Laboratory Data:  Lab Results   Component Value Date    WBC 7.1 03/18/2025    HGB 12.6 03/18/2025    HCT 37.8 03/18/2025    .0 03/18/2025    CREATSERUM 0.84 03/18/2025    BUN 15 03/18/2025     03/18/2025    K 3.6 03/18/2025     03/18/2025    CO2 32.0 03/18/2025    GLU 92 03/18/2025    CA 9.6 03/18/2025    ALB 4.2 03/18/2025    ALKPHO 95 03/18/2025    TP  7.7 03/18/2025    AST 15 03/18/2025    ALT <7 (L) 03/18/2025    PTT 32.4 06/25/2024    INR 0.92 06/25/2024    PTP 12.4 06/25/2024    T4F 0.9 05/22/2025    TSH 0.015 (L) 05/22/2025    DELPHINE 158 (H) 03/12/2017    LIP 36 11/02/2024    DDIMER 0.61 (H) 06/25/2024    ESRML 59 (H) 10/25/2017    CRP 1.86 (H) 10/25/2017    TROP <0.046 10/01/2017    CK 69 01/18/2025    B12 702 04/15/2021         Imaging:  No results found.      Impression:       ICD-10-CM    1. Laryngopharyngeal reflux (LPR)  K21.9       2. Hypertrophy of nasal turbinates  J34.3       3. Allergic rhinitis due to pollen, unspecified seasonality  J30.1       4. Chronic cough  R05.3           Recommendations:     Thyroid disorder with compressive symptoms  Thyroid disorder with compressive symptoms, including neck swelling and difficulty regulating hormone levels. Symptoms include fatigue, neck swelling, and vibration, particularly during sleep. Thyroid surgery is considered due to these symptoms and regulation challenges. Surgery is not emergent but reasonable. Risks include potential vocal cord nerve damage (1% risk), temporary hoarseness, and hypocalcemia due to parathyroid gland stunning. Surgery requires a one-night hospital stay for calcium monitoring. Recovery involves avoiding strenuous activity for two weeks. She prefers surgery to alleviate symptoms and manage with one medication post-surgery. She has postoperative nausea and vomiting, which will be communicated to the anesthesiologist.  - And difficulty after discussion with the patient I do think that she is a good candidate for total thyroidectomy for her Graves' disease that she has had compressive symptoms managing medication doses  - Order updated thyroid ultrasound to assess size changes before surgery.  - Provide a handout on thyroid surgery for review.  - Coordinate with the scheduling team to discuss potential surgery dates.  - Inform anesthesiologist of her postoperative nausea and  vomiting.    Chronic cough  Chronic cough previously managed with nasal rinse and budesonide irrigation, showing improvement. Current cough may be exacerbated by bronchitis.  - Continue management with nasal rinse and prescription as previously prescribed.    Bronchitis  Acute bronchitis with symptoms persisting for about a week, improving with current treatment regimen.  - Continue treatment with antibiotics, inhaler, and cough medicine as prescribed.        Arash Ken,    Otolaryngology/Rhinology, Sinus, and Endoscopic Skull Base Surgery  [facility] [place] [id number]  Phone [phone]  Fax [phone]  9/4/2024  7:49 PM  6/6/2025

## 2025-06-07 ENCOUNTER — HOSPITAL ENCOUNTER (OUTPATIENT)
Dept: MAMMOGRAPHY | Facility: HOSPITAL | Age: 55
Discharge: HOME | End: 2025-06-07
Attending: NURSE PRACTITIONER
Payer: COMMERCIAL

## 2025-06-07 DIAGNOSIS — Z12.31 ENCOUNTER FOR SCREENING MAMMOGRAM FOR MALIGNANT NEOPLASM OF BREAST: ICD-10-CM

## 2025-06-07 PROCEDURE — 77063 BREAST TOMOSYNTHESIS BI: CPT | Performed by: NURSE PRACTITIONER

## 2025-06-07 PROCEDURE — 77067 SCR MAMMO BI INCL CAD: CPT | Performed by: NURSE PRACTITIONER

## 2025-06-10 ENCOUNTER — TELEPHONE (OUTPATIENT)
Dept: OTOLARYNGOLOGY | Facility: CLINIC | Age: 55
End: 2025-06-10

## 2025-06-10 NOTE — TELEPHONE ENCOUNTER
Spoke with patient regarding surgery date in August. Patient would like surgery date in July, will call patient tomorrow if any other dates are available,

## 2025-06-18 DIAGNOSIS — E05.00 GRAVES DISEASE: Primary | ICD-10-CM

## 2025-06-19 ENCOUNTER — TELEPHONE (OUTPATIENT)
Dept: OTOLARYNGOLOGY | Facility: CLINIC | Age: 55
End: 2025-06-19

## 2025-06-19 DIAGNOSIS — E05.00 GRAVES DISEASE: Primary | ICD-10-CM

## 2025-06-23 ENCOUNTER — LAB ENCOUNTER (OUTPATIENT)
Dept: LAB | Age: 55
End: 2025-06-23
Attending: NURSE PRACTITIONER
Payer: COMMERCIAL

## 2025-06-23 ENCOUNTER — OFFICE VISIT (OUTPATIENT)
Dept: INTERNAL MEDICINE CLINIC | Facility: CLINIC | Age: 55
End: 2025-06-23
Payer: COMMERCIAL

## 2025-06-23 ENCOUNTER — TELEPHONE (OUTPATIENT)
Dept: INTERNAL MEDICINE CLINIC | Facility: CLINIC | Age: 55
End: 2025-06-23

## 2025-06-23 VITALS
RESPIRATION RATE: 17 BRPM | SYSTOLIC BLOOD PRESSURE: 124 MMHG | BODY MASS INDEX: 47.39 KG/M2 | WEIGHT: 251 LBS | HEART RATE: 85 BPM | HEIGHT: 61 IN | TEMPERATURE: 97 F | OXYGEN SATURATION: 95 % | DIASTOLIC BLOOD PRESSURE: 82 MMHG

## 2025-06-23 DIAGNOSIS — E05.00 GRAVES DISEASE: ICD-10-CM

## 2025-06-23 DIAGNOSIS — E05.90 HYPERTHYROIDISM: ICD-10-CM

## 2025-06-23 DIAGNOSIS — R73.03 PREDIABETES: ICD-10-CM

## 2025-06-23 DIAGNOSIS — I10 PRIMARY HYPERTENSION: ICD-10-CM

## 2025-06-23 DIAGNOSIS — Z86.711 HISTORY OF PULMONARY EMBOLISM: ICD-10-CM

## 2025-06-23 DIAGNOSIS — E05.00 GRAVES DISEASE: Primary | ICD-10-CM

## 2025-06-23 DIAGNOSIS — E78.5 HYPERLIPIDEMIA, UNSPECIFIED HYPERLIPIDEMIA TYPE: ICD-10-CM

## 2025-06-23 DIAGNOSIS — E66.01 MORBID OBESITY (HCC): ICD-10-CM

## 2025-06-23 LAB
ALBUMIN SERPL-MCNC: 4.3 G/DL (ref 3.2–4.8)
ALBUMIN/GLOB SERPL: 1.4 {RATIO} (ref 1–2)
ALP LIVER SERPL-CCNC: 86 U/L (ref 41–108)
ALT SERPL-CCNC: <7 U/L (ref 10–49)
ANION GAP SERPL CALC-SCNC: 6 MMOL/L (ref 0–18)
AST SERPL-CCNC: 17 U/L (ref ?–34)
ATRIAL RATE: 74 BPM
BASOPHILS # BLD AUTO: 0.02 X10(3) UL (ref 0–0.2)
BASOPHILS NFR BLD AUTO: 0.4 %
BILIRUB SERPL-MCNC: 0.7 MG/DL (ref 0.3–1.2)
BUN BLD-MCNC: 11 MG/DL (ref 9–23)
CALCIUM BLD-MCNC: 9.9 MG/DL (ref 8.7–10.6)
CHLORIDE SERPL-SCNC: 102 MMOL/L (ref 98–112)
CO2 SERPL-SCNC: 34 MMOL/L (ref 21–32)
CREAT BLD-MCNC: 0.69 MG/DL (ref 0.55–1.02)
EGFRCR SERPLBLD CKD-EPI 2021: 104 ML/MIN/1.73M2 (ref 60–?)
EOSINOPHIL # BLD AUTO: 0.2 X10(3) UL (ref 0–0.7)
EOSINOPHIL NFR BLD AUTO: 3.7 %
ERYTHROCYTE [DISTWIDTH] IN BLOOD BY AUTOMATED COUNT: 12.5 %
FASTING STATUS PATIENT QL REPORTED: NO
GLOBULIN PLAS-MCNC: 3.1 G/DL (ref 2–3.5)
GLUCOSE BLD-MCNC: 96 MG/DL (ref 70–99)
HCT VFR BLD AUTO: 38.5 % (ref 35–48)
HGB BLD-MCNC: 12.9 G/DL (ref 12–16)
IMM GRANULOCYTES # BLD AUTO: 0.01 X10(3) UL (ref 0–1)
IMM GRANULOCYTES NFR BLD: 0.2 %
LYMPHOCYTES # BLD AUTO: 2.08 X10(3) UL (ref 1–4)
LYMPHOCYTES NFR BLD AUTO: 38.4 %
MCH RBC QN AUTO: 28.5 PG (ref 26–34)
MCHC RBC AUTO-ENTMCNC: 33.5 G/DL (ref 31–37)
MCV RBC AUTO: 85 FL (ref 80–100)
MONOCYTES # BLD AUTO: 0.47 X10(3) UL (ref 0.1–1)
MONOCYTES NFR BLD AUTO: 8.7 %
NEUTROPHILS # BLD AUTO: 2.64 X10 (3) UL (ref 1.5–7.7)
NEUTROPHILS # BLD AUTO: 2.64 X10(3) UL (ref 1.5–7.7)
NEUTROPHILS NFR BLD AUTO: 48.6 %
OSMOLALITY SERPL CALC.SUM OF ELEC: 293 MOSM/KG (ref 275–295)
P AXIS: 58 DEGREES
P-R INTERVAL: 174 MS
PLATELET # BLD AUTO: 308 10(3)UL (ref 150–450)
POTASSIUM SERPL-SCNC: 3.6 MMOL/L (ref 3.5–5.1)
PROT SERPL-MCNC: 7.4 G/DL (ref 5.7–8.2)
Q-T INTERVAL: 402 MS
QRS DURATION: 90 MS
QTC CALCULATION (BEZET): 446 MS
R AXIS: -31 DEGREES
RBC # BLD AUTO: 4.53 X10(6)UL (ref 3.8–5.3)
SODIUM SERPL-SCNC: 142 MMOL/L (ref 136–145)
T AXIS: 32 DEGREES
VENTRICULAR RATE: 74 BPM
WBC # BLD AUTO: 5.4 X10(3) UL (ref 4–11)

## 2025-06-23 PROCEDURE — 80053 COMPREHEN METABOLIC PANEL: CPT | Performed by: NURSE PRACTITIONER

## 2025-06-23 PROCEDURE — 85025 COMPLETE CBC W/AUTO DIFF WBC: CPT | Performed by: NURSE PRACTITIONER

## 2025-06-23 NOTE — TELEPHONE ENCOUNTER
Office visit today 6/23/25 , seen with provider Fanta Flores for pre op for Total thyroidectomy. Faxed Paperwork of office visit note of medical clearance and EKG testing  to ENT Department. Faxed paperwork to [phone]. Received conformation. Placed completed Paperwork in [place] red folder.

## 2025-06-23 NOTE — PROGRESS NOTES
[facility]  PRE OP RISK ASSESSMENT    REASON FOR CONSULT: Pre-op risk assessment for surgical procedure total thyroidectomy planned for 25 with Dr Ken .    REQUESTING PHYSICIAN: Dr Ken    CHIEF COMPLAINT:   Chief Complaint   Patient presents with    Pre-Op Exam     Rm-10,aw,        History of Present Illness      The patient is a 55 year old  female  presents for pre procedure evaluation.  Ms Mann has been following with Endocrinology, Dr Madison as well as ENT for history of hyperthyroidism and Graves Disease.  Her levels have been difficult to manage.  She does become symptomatic when her thyroid is enlarged.  She has opted to proceed with total thyroidectomy.      HTN  stable  on hydrochlorothiazide 25mg.   Stopped irbesartan due to cough.    Dyslipidemia  stable     Prediabetes  A1c 5.8 in early .    Hx PE   2022.  Considered provoked due to travel.  Completed Eliquis x 3 mo.    Elevated LFTs.  had normalized on labs done by Endo in 2025.    Chronic cough somewhat aggravated by thyroid.    Has seen Dr Taylor in summer 2024.   Stable  continues on symbicort.        EXERCISE TOLERANCE: Ms Mann is acitve.  She is able to ambulate several blocks without resting due to chest pain or SOB.  The patient is able to achieve 4 MET and more of CV exercise with exertional symptoms.     PREVIOUS SURGERY:   Patient has had previous surgery without incident.     Hx of VTE: PE  as above     Past Medical History:    Past Medical History:   Graves disease    High blood pressure    History of blood clots    Bllod clotnin lung    Morbid obesity with BMI of 45.0-49.9, adult (HCC)    Obesity    PONV (postoperative nausea and vomiting)    Pulmonary embolism (HCC)    Unspecified essential hypertension    Visual impairment    contacts and glasses         Past Surgical History:    Past Surgical History:  Procedure Laterality Date          x3    Cholecystectomy  3/2023     Colonoscopy  2017    Endometrial ablation      Hernia surgery      inguinal hernai repair    Hysterectomy      Hysteroscopy,with endometrial  10/01/2006    Oophorectomy      Removal gallbladder      Total abdominal hysterectomy  2107    Tubal ligation  09/01/2004       Current Medications:    Current Medications[1]     Allergies:    Allergies as of 06/23/2025 - Reviewed 06/23/2025   Allergen Reaction Noted    Neosporin af [miconazole nitrate] RASH 04/12/2012    Sulfa antibiotics OTHER (SEE COMMENTS) 10/11/2018    Neomycin RASH 11/16/2015    Sulfur-salicylic acid [salicylic acid-sulfur] OTHER (SEE COMMENTS) 08/23/2018       SOCIAL HISTORY: Social History  Socioeconomic History    Marital status:    Tobacco Use    Smoking status: Never     Passive exposure: Past    Smokeless tobacco: Never   Vaping Use    Vaping status: Never Used   Substance and Sexual Activity    Alcohol use: Not Currently     Alcohol/week: 1.0 standard drink of alcohol    Drug use: No    Sexual activity: Yes   Other Topics Concern    Exercise No     Social Drivers of Health      Received from [facility]    [facility]            FAMILY HISTORY: Family History  Problem Relation Age of Onset    Other (CAD) Father     Other (HTN) Maternal Grandfather     Other (HTN) Mother     Cancer Mother     Hypertension Mother         REVIEW OF SYSTEMS:    GENERAL: feels well otherwise  SKIN: denies any unusual skin lesions  HEENT: denies blurred vision or double vision denies nasal congestion  LUNGS: denies shortness of breath with exertion  CARDIOVASCULAR: denies chest pain on exertion  GI: denies abdominal pain, denies heartburn  : denies dysuria, urgency, frequency, hematuria  MUSCULOSKELETAL: denies back pain  NEURO: denies headaches    PHYSICAL EXAM:  /82 (BP Location: Left arm, Patient Position: Sitting, Cuff Size: large)   Pulse 85   Temp 97.2 °F (36.2 °C) (Temporal)   Resp 17   Ht 5' 1\" (1.549 m)   Wt 251 lb (113.9 kg)   LMP  01/23/2017 (LMP Unknown)   SpO2 95%   Breastfeeding No   BMI 47.43 kg/m²    GENERAL: well developed, well nourished,in no apparent distress  HEENT: atraumatic, normocephalic,ears and throat are clear  NECK: supple,no adenopathy  CHEST: no chest tenderness  LUNGS: clear to auscultation  CARDIO: RRR   GI: good BS's,no masses, HSM or tenderness  EXTREMITIES: no edema  NEURO: Oriented times three,    LABS:  CMP and CBC ordered  EKG: normal rate normal rhythm.  No change from previous.    Assessment & Plan      Encounter Diagnoses   Name Primary?    Graves disease Yes    Hyperthyroidism     Morbid obesity (HCC)     Primary hypertension  stable  HCTZ     Prediabetes     History of pulmonary embolism     Hyperlipidemia, unspecified hyperlipidemia type        Orders Placed This Encounter   Procedures    Comp Metabolic Panel (14) [E]    CBC W Differential W Platelet [E]       Ms Mann has had surgery before without incident.  Caution should be taken given her history of pulmonary embolism although this is not a surgery that should significantly impact her post op mobility.  Pending labs as above, there are no contraindications proceeding with surgery.  Please feel free to call with questions or concerns.    Addendum  labs are stable  ok for surgery.   Meds & Refills for this Visit:  Requested Prescriptions      No prescriptions requested or ordered in this encounter       Imaging & Consults:  ELECTROCARDIOGRAM, COMPLETE    No follow-ups on file.  There are no Patient Instructions on file for this visit.             [1]   Current Outpatient Medications   Medication Sig Dispense Refill    methIMAzole 5 MG Oral Tab Take 1 tablet (5 mg total) by mouth in the morning and 1 tablet (5 mg total) before bedtime. 180 tablet 0    budesonide 0.5 MG/2ML Inhalation Suspension Add to neilmed sinus rinse bottle along with distilled water to the line. Use half the bottle to irrigate one nasal cavity, and the other half to irrigate the  other nasal cavity. Do this twice a day 120 mL 3    fluticasone-salmeterol 250-50 MCG/ACT Inhalation Aerosol Powder, Breath Activated Inhale 1 puff into the lungs 2 (two) times daily. 180 each 1    Potassium Chloride ER 10 MEQ Oral Cap CR Take 1 capsule (10 mEq total) by mouth 2 (two) times daily. 180 capsule 3    HYDROCHLOROTHIAZIDE 25 MG Oral Tab TAKE 1 TABLET(25 MG) BY MOUTH DAILY (Patient taking differently: Take 1 tablet (25 mg total) by mouth every morning.) 90 tablet 1    Cholecalciferol (VITAMIN D) 50 MCG (2000 UT) Oral Cap Take by mouth in the morning.

## 2025-06-24 NOTE — DISCHARGE INSTRUCTIONS
HOME INSTRUCTIONS  AMBSURG HOME CARE INSTRUCTIONS: POST-OP ANESTHESIA  The medication that you received for sedation or general anesthesia can last up to 24 hours. Your judgment and reflexes may be altered, even if you feel like your normal self.      We Recommend:   Do not drive any motor vehicle or bicycle   Avoid mowing the lawn, playing sports, or working with power tools/applicances (power saws, electric knives or mixers)   That you have someone stay with you on your first night home   Do not drink alcohol or take sleeping pills or tranquilizers   Do not sign legal documents within 24 hours of your procedure   If you had a nerve block for your surgery, take extra care not to put any pressure on your arm or hand for 24 hours    It is normal:  For you to have a sore throat if you had a breathing tube during surgery (while you were asleep!). The sore throat should get better within 48 hours. You can gargle with warm salt water (1/2 tsp in 4 oz warm water) or use a throat lozenge for comfort  To feel muscle aches or soreness especially in the abdomen, chest or neck. The achy feeling should go away in the next 24 hours  To feel weak, sleepy or \"wiped out\". Your should start feeling better in the next 24 hours.   To experience mild discomforts such as sore lip or tongue, headache, cramps, gas pains or a bloated feeling in your abdomen.   To experience mild back pain or soreness for a day or two if you had spinal or epidural anesthesia.   If you had laparoscopic surgery, to feel shoulder pain or discomfort on the day of surgery.   For some patients to have nausea after surgery/anesthesia    If you feel nausea or experience vomiting:   Try to move around less.   Eat less than usual or drink only liquids until the next morning   Nausea should resolve in about 24 hours    If you have a problem when you are at home:    Call your surgeons office   Discharge Instructions: After Your Surgery  You’ve just had surgery. During  surgery, you were given medicine called anesthesia to keep you relaxed and free of pain. After surgery, you may have some pain or nausea. This is common. Here are some tips for feeling better and getting well after surgery.   Going home  Your healthcare provider will show you how to take care of yourself when you go home. They'll also answer your questions. Have an adult family member or friend drive you home. For the first 24 hours after your surgery:   Don't drive or use heavy equipment.  Don't make important decisions or sign legal papers.  Take medicines as directed.  Don't drink alcohol.  Have someone stay with you, if needed. They can watch for problems and help keep you safe.  Be sure to go to all follow-up visits with your healthcare provider. And rest after your surgery for as long as your provider tells you to.   Coping with pain  If you have pain after surgery, pain medicine will help you feel better. Take it as directed, before pain becomes severe. Also, ask your healthcare provider or pharmacist about other ways to control pain. This might be with heat, ice, or relaxation. And follow any other instructions your surgeon or nurse gives you.      Stay on schedule with your medicine.     Tips for taking pain medicine  To get the best relief possible, remember these points:   Pain medicines can upset your stomach. Taking them with a little food may help.  Most pain relievers taken by mouth need at least 20 to 30 minutes to start to work.  Don't wait till your pain becomes severe before you take your medicine. Try to time your medicine so that you can take it before starting an activity. This might be before you get dressed, go for a walk, or sit down for dinner.  Constipation is a common side effect of some pain medicines. Call your healthcare provider before taking any medicines, such as laxatives or stool softeners, to help ease constipation. Also ask if you should skip any foods. Drinking lots of fluids  and eating foods, such as fruits and vegetables, that are high in fiber can also help. Remember, don't take laxatives unless your surgeon has prescribed them.  Drinking alcohol and taking pain medicine can cause dizziness and slow your breathing. It can even be deadly. Don't drink alcohol while taking pain medicine.  Pain medicine can make you react more slowly to things. Don't drive or run machinery while taking pain medicine.  Your healthcare provider may tell you to take acetaminophen to help ease your pain. Ask them how much you're supposed to take each day. Acetaminophen or other pain relievers may interact with your prescription medicines or other over-the-counter (OTC) medicines. Some prescription medicines have acetaminophen and other ingredients in them. Using both prescription and OTC acetaminophen for pain can cause you to accidentally overdose. Read the labels on your OTC medicines with care. This will help you to clearly know the list of ingredients, how much to take, and any warnings. It may also help you not take too much acetaminophen. If you have questions or don't understand the information, ask your pharmacist or healthcare provider to explain it to you before you take the OTC medicine.   Managing nausea  Some people have an upset stomach (nausea) after surgery. This is often because of anesthesia, pain, or pain medicine, less movement of food in the stomach, or the stress of surgery. These tips will help you handle nausea and eat healthy foods as you get better. If you were on a special food plan before surgery, ask your healthcare provider if you should follow it while you get better. Check with your provider on how your eating should progress. It may depend on the surgery you had. These general tips may help:   Don't push yourself to eat. Your body will tell you when to eat and how much.  Start off with clear liquids and soup. They're easier to digest.  Next try semi-solid foods as you feel  ready. These include mashed potatoes, applesauce, and gelatin.  Slowly move to solid foods. Don’t eat fatty, rich, or spicy foods at first.  Don't force yourself to have 3 large meals a day. Instead eat smaller amounts more often.  Take pain medicines with a small amount of solid food, such as crackers or toast. This helps prevent nausea.  When to call your healthcare provider  Call your healthcare provider right away if you have any of these:   You still have too much pain, or the pain gets worse, after taking the medicine. The medicine may not be strong enough. Or there may be a complication from the surgery.  You feel too sleepy, dizzy, or groggy. The medicine may be too strong.  Side effects, such as nausea or vomiting. Your healthcare provider may advise taking other medicines to treat these or may change your treatment plan..  Skin changes, such as rash, itching, or hives. This may mean you have an allergic reaction. Your provider may advise taking other medicines.  The incision looks different (for instance, part of it opens up).  Bleeding or fluid leaking from the incision site, and you weren't told to expect that.  Fever of 100.4°F (38°C) or higher, or as directed by your healthcare provider.  Call 911  Call 911 right away if you have:   Trouble breathing  Facial swelling    If you have obstructive sleep apnea   You were given anesthesia medicine during surgery to keep you comfortable and free of pain. After surgery, you may have more apnea spells because of this medicine and other medicines you were given. The spells may last longer than normal.    At home:  Keep using the continuous positive airway pressure (CPAP) device when you sleep. Unless your healthcare provider tells you not to, use it when you sleep, day or night. CPAP is a common device used to treat obstructive sleep apnea.  Talk with your provider before taking any pain medicine, muscle relaxants, or sedatives. Your provider will tell you about  the possible dangers of taking these medicines.  Contact your provider if your sleeping changes a lot even when taking medicines as directed.  [employer] last reviewed this educational content on 4/1/2024  This information is for informational purposes only. This is not intended to be a substitute for professional medical advice, diagnosis, or treatment. Always seek the advice and follow the directions from your physician or other qualified health care provider.  © 4035-1513 [employer]. All rights reserved. This information is not intended as a substitute for professional medical care. Always follow your healthcare professional's instructions.

## 2025-06-25 ENCOUNTER — HOSPITAL ENCOUNTER (OUTPATIENT)
Facility: HOSPITAL | Age: 55
Discharge: HOME | End: 2025-06-26
Attending: STUDENT IN AN ORGANIZED HEALTH CARE EDUCATION/TRAINING PROGRAM | Admitting: STUDENT IN AN ORGANIZED HEALTH CARE EDUCATION/TRAINING PROGRAM
Payer: COMMERCIAL

## 2025-06-25 ENCOUNTER — ANESTHESIA EVENT (OUTPATIENT)
Dept: SURGERY | Facility: HOSPITAL | Age: 55
End: 2025-06-25
Payer: COMMERCIAL

## 2025-06-25 ENCOUNTER — ANESTHESIA (OUTPATIENT)
Dept: SURGERY | Facility: HOSPITAL | Age: 55
End: 2025-06-25
Payer: COMMERCIAL

## 2025-06-25 DIAGNOSIS — Z01.818 PREOP TESTING: Primary | ICD-10-CM

## 2025-06-25 DIAGNOSIS — E05.00 GRAVES DISEASE: ICD-10-CM

## 2025-06-25 PROBLEM — E04.9 THYROID GOITER: Status: ACTIVE | Noted: 2025-06-25

## 2025-06-25 LAB
CALCIUM BLD-MCNC: 9.5 MG/DL (ref 8.7–10.4)
PTH-INTACT SERPL-MCNC: 51.8 PG/ML (ref 18.5–88)

## 2025-06-25 PROCEDURE — 99204 OFFICE O/P NEW MOD 45 MIN: CPT | Performed by: HOSPITALIST

## 2025-06-25 PROCEDURE — 60240 REMOVAL OF THYROID: CPT | Performed by: SPECIALIST

## 2025-06-25 PROCEDURE — 60240 REMOVAL OF THYROID: CPT | Performed by: STUDENT IN AN ORGANIZED HEALTH CARE EDUCATION/TRAINING PROGRAM

## 2025-06-25 RX ORDER — HYDROCHLOROTHIAZIDE 25 MG/1
25 TABLET ORAL EVERY MORNING
Status: DISCONTINUED | OUTPATIENT
Start: 2025-06-26 | End: 2025-06-26

## 2025-06-25 RX ORDER — POTASSIUM CHLORIDE 750 MG/1
10 TABLET, EXTENDED RELEASE ORAL 2 TIMES DAILY
Status: DISCONTINUED | OUTPATIENT
Start: 2025-06-25 | End: 2025-06-26

## 2025-06-25 RX ORDER — FAMOTIDINE 10 MG/ML
20 INJECTION, SOLUTION INTRAVENOUS ONCE
Status: COMPLETED | OUTPATIENT
Start: 2025-06-25 | End: 2025-06-25

## 2025-06-25 RX ORDER — ACETAMINOPHEN 500 MG
1000 TABLET ORAL ONCE
Status: COMPLETED | OUTPATIENT
Start: 2025-06-25 | End: 2025-06-25

## 2025-06-25 RX ORDER — HYDROMORPHONE HYDROCHLORIDE 1 MG/ML
0.4 INJECTION, SOLUTION INTRAMUSCULAR; INTRAVENOUS; SUBCUTANEOUS EVERY 5 MIN PRN
Status: DISCONTINUED | OUTPATIENT
Start: 2025-06-25 | End: 2025-06-25 | Stop reason: HOSPADM

## 2025-06-25 RX ORDER — METOCLOPRAMIDE 10 MG/1
10 TABLET ORAL ONCE
Status: COMPLETED | OUTPATIENT
Start: 2025-06-25 | End: 2025-06-25

## 2025-06-25 RX ORDER — METOPROLOL TARTRATE 1 MG/ML
5 INJECTION, SOLUTION INTRAVENOUS ONCE
Status: COMPLETED | OUTPATIENT
Start: 2025-06-25 | End: 2025-06-25

## 2025-06-25 RX ORDER — MORPHINE SULFATE 4 MG/ML
4 INJECTION, SOLUTION INTRAMUSCULAR; INTRAVENOUS EVERY 10 MIN PRN
Status: DISCONTINUED | OUTPATIENT
Start: 2025-06-25 | End: 2025-06-25 | Stop reason: HOSPADM

## 2025-06-25 RX ORDER — HYDROMORPHONE HYDROCHLORIDE 1 MG/ML
0.2 INJECTION, SOLUTION INTRAMUSCULAR; INTRAVENOUS; SUBCUTANEOUS EVERY 5 MIN PRN
Status: DISCONTINUED | OUTPATIENT
Start: 2025-06-25 | End: 2025-06-25 | Stop reason: HOSPADM

## 2025-06-25 RX ORDER — EPHEDRINE SULFATE 50 MG/ML
INJECTION, SOLUTION INTRAVENOUS AS NEEDED
Status: DISCONTINUED | OUTPATIENT
Start: 2025-06-25 | End: 2025-06-25 | Stop reason: SURG

## 2025-06-25 RX ORDER — FLUTICASONE PROPIONATE AND SALMETEROL 250; 50 UG/1; UG/1
1 POWDER RESPIRATORY (INHALATION) 2 TIMES DAILY
Status: DISCONTINUED | OUTPATIENT
Start: 2025-06-25 | End: 2025-06-26

## 2025-06-25 RX ORDER — SODIUM CHLORIDE, SODIUM LACTATE, POTASSIUM CHLORIDE, CALCIUM CHLORIDE 600; 310; 30; 20 MG/100ML; MG/100ML; MG/100ML; MG/100ML
INJECTION, SOLUTION INTRAVENOUS CONTINUOUS
Status: DISCONTINUED | OUTPATIENT
Start: 2025-06-25 | End: 2025-06-26

## 2025-06-25 RX ORDER — FAMOTIDINE 20 MG/1
20 TABLET, FILM COATED ORAL ONCE
Status: COMPLETED | OUTPATIENT
Start: 2025-06-25 | End: 2025-06-25

## 2025-06-25 RX ORDER — METOCLOPRAMIDE HYDROCHLORIDE 5 MG/ML
10 INJECTION INTRAMUSCULAR; INTRAVENOUS ONCE
Status: COMPLETED | OUTPATIENT
Start: 2025-06-25 | End: 2025-06-25

## 2025-06-25 RX ORDER — SCOPOLAMINE 1 MG/3D
1 PATCH, EXTENDED RELEASE TRANSDERMAL ONCE
Status: DISCONTINUED | OUTPATIENT
Start: 2025-06-25 | End: 2025-06-26

## 2025-06-25 RX ORDER — OXYCODONE HYDROCHLORIDE 5 MG/1
5 TABLET ORAL EVERY 4 HOURS PRN
Refills: 0 | Status: DISCONTINUED | OUTPATIENT
Start: 2025-06-25 | End: 2025-06-26

## 2025-06-25 RX ORDER — HYDROMORPHONE HYDROCHLORIDE 1 MG/ML
INJECTION, SOLUTION INTRAMUSCULAR; INTRAVENOUS; SUBCUTANEOUS AS NEEDED
Status: DISCONTINUED | OUTPATIENT
Start: 2025-06-25 | End: 2025-06-25 | Stop reason: SURG

## 2025-06-25 RX ORDER — LIDOCAINE HYDROCHLORIDE 10 MG/ML
INJECTION, SOLUTION EPIDURAL; INFILTRATION; INTRACAUDAL; PERINEURAL AS NEEDED
Status: DISCONTINUED | OUTPATIENT
Start: 2025-06-25 | End: 2025-06-25 | Stop reason: SURG

## 2025-06-25 RX ORDER — NALOXONE HYDROCHLORIDE 0.4 MG/ML
0.08 INJECTION, SOLUTION INTRAMUSCULAR; INTRAVENOUS; SUBCUTANEOUS AS NEEDED
Status: DISCONTINUED | OUTPATIENT
Start: 2025-06-25 | End: 2025-06-25 | Stop reason: HOSPADM

## 2025-06-25 RX ORDER — MORPHINE SULFATE 10 MG/ML
6 INJECTION, SOLUTION INTRAMUSCULAR; INTRAVENOUS EVERY 10 MIN PRN
Status: DISCONTINUED | OUTPATIENT
Start: 2025-06-25 | End: 2025-06-25 | Stop reason: HOSPADM

## 2025-06-25 RX ORDER — ONDANSETRON 2 MG/ML
INJECTION INTRAMUSCULAR; INTRAVENOUS AS NEEDED
Status: DISCONTINUED | OUTPATIENT
Start: 2025-06-25 | End: 2025-06-25 | Stop reason: SURG

## 2025-06-25 RX ORDER — HYDROMORPHONE HYDROCHLORIDE 1 MG/ML
0.6 INJECTION, SOLUTION INTRAMUSCULAR; INTRAVENOUS; SUBCUTANEOUS EVERY 5 MIN PRN
Status: DISCONTINUED | OUTPATIENT
Start: 2025-06-25 | End: 2025-06-25 | Stop reason: HOSPADM

## 2025-06-25 RX ORDER — ACETAMINOPHEN AND CODEINE PHOSPHATE 300; 30 MG/1; MG/1
1 TABLET ORAL EVERY 4 HOURS PRN
Status: DISCONTINUED | OUTPATIENT
Start: 2025-06-25 | End: 2025-06-26

## 2025-06-25 RX ORDER — SODIUM CHLORIDE, SODIUM LACTATE, POTASSIUM CHLORIDE, CALCIUM CHLORIDE 600; 310; 30; 20 MG/100ML; MG/100ML; MG/100ML; MG/100ML
INJECTION, SOLUTION INTRAVENOUS CONTINUOUS
Status: DISCONTINUED | OUTPATIENT
Start: 2025-06-25 | End: 2025-06-25 | Stop reason: HOSPADM

## 2025-06-25 RX ORDER — DEXAMETHASONE SODIUM PHOSPHATE 4 MG/ML
VIAL (ML) INJECTION AS NEEDED
Status: DISCONTINUED | OUTPATIENT
Start: 2025-06-25 | End: 2025-06-25 | Stop reason: SURG

## 2025-06-25 RX ORDER — LIDOCAINE HYDROCHLORIDE AND EPINEPHRINE 10; 10 MG/ML; UG/ML
INJECTION, SOLUTION INFILTRATION; PERINEURAL AS NEEDED
Status: DISCONTINUED | OUTPATIENT
Start: 2025-06-25 | End: 2025-06-25 | Stop reason: HOSPADM

## 2025-06-25 RX ORDER — ROCURONIUM BROMIDE 10 MG/ML
INJECTION, SOLUTION INTRAVENOUS AS NEEDED
Status: DISCONTINUED | OUTPATIENT
Start: 2025-06-25 | End: 2025-06-25 | Stop reason: SURG

## 2025-06-25 RX ORDER — MIDAZOLAM HYDROCHLORIDE 1 MG/ML
INJECTION INTRAMUSCULAR; INTRAVENOUS AS NEEDED
Status: DISCONTINUED | OUTPATIENT
Start: 2025-06-25 | End: 2025-06-25 | Stop reason: SURG

## 2025-06-25 RX ORDER — MORPHINE SULFATE 4 MG/ML
2 INJECTION, SOLUTION INTRAMUSCULAR; INTRAVENOUS EVERY 10 MIN PRN
Status: DISCONTINUED | OUTPATIENT
Start: 2025-06-25 | End: 2025-06-25 | Stop reason: HOSPADM

## 2025-06-25 RX ADMIN — EPHEDRINE SULFATE 25 MG: 50 INJECTION, SOLUTION INTRAVENOUS at 09:55:00

## 2025-06-25 RX ADMIN — ROCURONIUM BROMIDE 30 MG: 10 INJECTION, SOLUTION INTRAVENOUS at 09:49:00

## 2025-06-25 RX ADMIN — HYDROMORPHONE HYDROCHLORIDE 0.5 MG: 1 INJECTION, SOLUTION INTRAMUSCULAR; INTRAVENOUS; SUBCUTANEOUS at 12:06:00

## 2025-06-25 RX ADMIN — HYDROMORPHONE HYDROCHLORIDE 0.5 MG: 1 INJECTION, SOLUTION INTRAMUSCULAR; INTRAVENOUS; SUBCUTANEOUS at 11:14:00

## 2025-06-25 RX ADMIN — DEXAMETHASONE SODIUM PHOSPHATE 8 MG: 4 MG/ML VIAL (ML) INJECTION at 09:50:00

## 2025-06-25 RX ADMIN — ONDANSETRON 4 MG: 2 INJECTION INTRAMUSCULAR; INTRAVENOUS at 09:50:00

## 2025-06-25 RX ADMIN — MIDAZOLAM HYDROCHLORIDE 2 MG: 1 INJECTION INTRAMUSCULAR; INTRAVENOUS at 09:42:00

## 2025-06-25 RX ADMIN — LIDOCAINE HYDROCHLORIDE 50 MG: 10 INJECTION, SOLUTION EPIDURAL; INFILTRATION; INTRACAUDAL; PERINEURAL at 09:45:00

## 2025-06-25 RX ADMIN — ROCURONIUM BROMIDE 20 MG: 10 INJECTION, SOLUTION INTRAVENOUS at 10:51:00

## 2025-06-25 RX ADMIN — SODIUM CHLORIDE, SODIUM LACTATE, POTASSIUM CHLORIDE, CALCIUM CHLORIDE: 600; 310; 30; 20 INJECTION, SOLUTION INTRAVENOUS at 09:40:00

## 2025-06-25 RX ADMIN — EPHEDRINE SULFATE 25 MG: 50 INJECTION, SOLUTION INTRAVENOUS at 09:50:00

## 2025-06-25 NOTE — OPERATIVE REPORT
OTOLARYNGOLOGY/HEAD & NECK SURGERY  OPERATIVE REPORT    Patient Name: Shannon Mann     Date of Surgery: 6/25/2025      Attending Surgeon: Arash Ken DO     Assistant Surgeon: Giovanna Danielle MD    Anesthesia type: General     PREOPERATIVE DIAGNOSIS:   Thyroid goiter  Graves' hyperthyroidism    POSTOPERATIVE DIAGNOSIS:   Same    OPERATION:   Total thyroidectomy    OPERATIVE FINDINGS:   Very large and vascular thyroid gland    DESCRIPTION OF PROCEDURE:     The patient was brought to the operating room after all risks and benefits of the procedure were explained and consent was obtained. The patient was positively identified by the attending surgeon and was brought into the Operating Room and placed in the supine position. After induction of general anesthesia, the patient was orotracheally intubated and the tube was secured on the left side. All pressure points were carefully padded and a foam donut was placed underneath the head, and a shoulder roll placed underneath the shoulders.  The eyes were protected with tape.  A curvilinear incision was marked in the anterior neck in a skin crease, and injected using 1% lidocaine with 1-100,000 epinephrine.  The patient was drapped in the typical fashion for neck surgery A time-out was then performed.    Using a 15 blade scalpel incision was made through the skin and dermal layer.  Dissection was then carried down to the platysma which was  sharply incised.  Subplatysmal flaps were then elevated using monopolar cautery.  The midline raphae was then identified and bisected in the midline using monopolar cautery.  Dissection was carried down onto the top of the thyroid gland.  Once this was done dissection was carried laterally to the right side.  The fascia was dissected off of the thyroid gland and the strap musculature was lifted off of the thyroid gland.  Once this was done we exposed the inferior pole and carried dissection superiorly.  The superior pole was very  superior and large.  This made dissection difficult at the superior pole.  Multiple vessels were cauterized and using the LigaSure cautery device.  This freed up the superior pole and dissection was then carried inferiorly again.  Dissection was carried close to the capsule of the gland.  Once this was freed up the thyroid lobe was unable to be delivered.  I then carried dissection up and noted the location of the recurrent laryngeal nerve.  The thyroid gland was dissected away from the nerve.  Multiple large blood vessels and the thyroid gland appeared to be.  Bleeding from multiple areas due to the vascularity.  We successfully remove the right hemithyroid and then turned our attention to the left side.  The left side was performed in the same fashion.  Was also large and vascular.  We did locate the recurrent radial nerve dissected superiorly.  We dissected the thyroid gland off of the area of the appendage and then using bipolar cautery.  Dissection was meticulous.  We then were able to remove the thyroid off of Charles's ligament.  There was also parietal lobe which was removed using monopolar cautery.    We then began closing the incision.  A 7 NARA drain was inserted through a separate incision on the left side.  This was sutured in place.  The wound bed was irrigated and a Valsalva was performed.  There is no notable bleeding.  Surgicel was placed into the wound bed.  The strap musculature was closed using a 3-0 chromic suture placed in the running nonlocking fashion.  The platysma and deep dermal layer was closed using 3-0 chromic suture placed in a simple interrupted buried fashion.  The skin was then closed using 4-0 Prolene suture placed in the running subcuticular fashion.  Steri-Strips were then applied.  A Tegaderm was then applied.    The patient was then rotated back to the Anesthesia Team and was awakened and extubated having tolerated the procedure well, and then transferred to the PACU in stable  condition.     Specimens: Total thyroid     Complications: None     Estimated Blood Loss: 5 mL     Disposition: PACU     Condition: Stable

## 2025-06-25 NOTE — CONSULTS
[facility]    PATIENT'S NAME: MINI BASS   ATTENDING PHYSICIAN: Arash Ken DO   CONSULTING PHYSICIAN: Lorna Williamson MD   PATIENT ACCOUNT#:   [id number]    LOCATION:  [address]  MEDICAL RECORD #:   [id number]       YOB: 1970  ADMISSION DATE:       06/25/2025      CONSULT DATE:  06/25/2025    REPORT OF CONSULTATION      REASON FOR ADMISSION:  Post total thyroidectomy.    HISTORY OF PRESENT ILLNESS:  Patient is a 55-year-old  female who has a history of Graves' disease, hyperthyroidism, with enlarging thyroid goiter bilaterally on ultrasound.  Referred to ENT and scheduled today for the above-mentioned procedure by her surgeon, Dr. Ken.  Postoperatively, transferred to PACU for further monitoring.    PAST MEDICAL HISTORY:  Graves' disease, hyperthyroidism, and thyroid goiter; hypertension; hyperlipidemia; morbid obesity; pulmonary embolism.     PAST SURGICAL HISTORY:  Three C-sections, cholecystectomy, endometrial ablation, inguinal hernia repair, total abdominal hysterectomy.     MEDICATIONS:  Please see medication reconciliation list.      ALLERGIES:  Sulfa.     SOCIAL HISTORY:  No tobacco, alcohol, or drug use.  Usually independent in her basic activities of daily living.     FAMILY HISTORY:  Positive for coronary artery disease.     REVIEW OF SYSTEMS:  Currently resting in bed, neck discomfort.  No chest pain.  No shortness of breath.  Other 12-point review of systems is negative.        PHYSICAL EXAMINATION:    GENERAL:  Alert, oriented to time, place, and person.  No acute distress.   VITAL SIGNS:  Temperature 97.4, pulse 73, respiratory rate 16, blood pressure 146/86, pulse ox 100% on 2L nasal cannula oxygen.    HEENT:  Atraumatic.  Oropharynx clear.  Moist mucous membranes.  Ears, nose normal.   NECK:  Anterior neck dressing.  NARA drain.  No wound complications.   HEART:  Regular rate and rhythm.  S1, S2 auscultated.  No murmur.    ABDOMEN:   Soft, nondistended.  No tenderness.  Positive bowel sounds.    EXTREMITIES:  No peripheral edema, clubbing, or cyanosis.    NEUROLOGIC:  Motor and sensory intact.    ASSESSMENT AND PLAN:    1.   Thyroid goiter with hyperthyroidism and Graves' disease, status post total thyroidectomy.  Pain control.  Monitor surgical wound and drain.  Monitor calcium level.  Discontinue methimazole.  2.   Morbid obesity.  Monitor respiratory and hemodynamic status.   3.   Essential hypertension.  Continue home medications and monitor.    Dictated By Lorna Williamson MD  d: 06/25/2025 15:03:10  t: 06/25/2025 15:27:40  Job [id number]  FB/    cc: Arash Ken DO

## 2025-06-25 NOTE — ANESTHESIA POSTPROCEDURE EVALUATION
Patient: Shannon Mann    Procedure Summary       Date: 06/25/25 Room / Location: [facility] MAIN OR 03 / [facility] MAIN OR    Anesthesia Start: 0938 Anesthesia Stop: 1333    Procedure: Total thyroidectomy (Neck) Diagnosis:       Graves disease      (Graves disease [E05.00])    Surgeons: Arash Ken DO Anesthesiologist: Amara Badillo MD    Anesthesia Type: general ASA Status: 3            Anesthesia Type: general    Vitals Value Taken Time   /103 06/25/25 13:33   Temp 98 06/25/25 13:33   Pulse 112 06/25/25 13:33   Resp 18 06/25/25 13:33   SpO2 99 % 06/25/25 13:33   Vitals shown include unfiled device data.    [facility] AN Post Evaluation:   Patient Evaluated in PACU  Patient Participation: complete - patient participated  Level of Consciousness: awake and alert  Pain Score: 0  Pain Management: adequate  Airway Patency:patent  Yes    Nausea/Vomiting: none  Cardiovascular Status: acceptable  Respiratory Status: acceptable  Postoperative Hydration acceptable      Amara Badillo MD  6/25/2025 1:33 PM

## 2025-06-25 NOTE — ANESTHESIA PREPROCEDURE EVALUATION
Anesthesia PreOp Note    HPI:     Shannon Mann is a 55 year old female who presents for preoperative consultation requested by: Arash Ken DO    Date of Surgery: 6/25/2025    Procedure(s):  Total thyroidectomy  Indication: Graves disease [E05.00]    Relevant Problems   No relevant active problems       NPO:  Last Liquid Consumption Date: 06/24/25  Last Liquid Consumption Time: 1900  Last Solid Consumption Date: 06/24/25  Last Solid Consumption Time: 1900  Last Liquid Consumption Date: 06/24/25          History Review:  Patient Active Problem List    Diagnosis Date Noted    History of cholecystectomy 12/05/2023    History of pulmonary embolism     Primary osteoarthritis of left knee 11/22/2021    Hyperlipidemia 11/22/2021    Prediabetes 10/26/2020    Urinary incontinence 10/26/2020    Hyperthyroidism 01/21/2018    Graves disease 10/15/2017    Morbid obesity (HCC)     S/P hysterectomy     Primary hypertension 06/29/2012       Past Medical History[1]    Past Surgical History[2]    Prescriptions Prior to Admission[3]  Current Medications and Prescriptions Ordered in Epic[4]    Allergies[5]    Family History[6]  Social Hx on file[7]    Available pre-op labs reviewed.  Lab Results   Component Value Date    WBC 5.4 06/23/2025    RBC 4.53 06/23/2025    HGB 12.9 06/23/2025    HCT 38.5 06/23/2025    MCV 85.0 06/23/2025    MCH 28.5 06/23/2025    MCHC 33.5 06/23/2025    RDW 12.5 06/23/2025    .0 06/23/2025     Lab Results   Component Value Date     06/23/2025    K 3.6 06/23/2025     06/23/2025    CO2 34.0 (H) 06/23/2025    BUN 11 06/23/2025    CREATSERUM 0.69 06/23/2025    GLU 96 06/23/2025    CA 9.9 06/23/2025          Vital Signs:  Body mass index is 46.86 kg/m².   height is 1.549 m (5' 1\") and weight is 112.5 kg (248 lb). Her oral temperature is 98.1 °F (36.7 °C). Her blood pressure is 147/93 (abnormal) and her pulse is 75. Her respiration is 18 and oxygen saturation is 97%.   Vitals:    06/23/25  0945 25 0854   BP:  (!) 147/93   Pulse:  75   Resp:  18   Temp:  98.1 °F (36.7 °C)   TempSrc:  Oral   SpO2:  97%   Weight: 113.4 kg (250 lb) 112.5 kg (248 lb)   Height: 1.549 m (5' 1\") 1.549 m (5' 1\")        Anesthesia Evaluation     Patient summary reviewed and Nursing notes reviewed    History of anesthetic complications   Airway   Mallampati: IV  TM distance: <3 FB  Neck ROM: full  Dental      Pulmonary - negative ROS and normal exam   Cardiovascular - normal exam  Exercise tolerance: good  (+) hypertension    Neuro/Psych - negative ROS     GI/Hepatic/Renal - negative ROS     Endo/Other    (+) diabetes mellitus, hyperthyroidism, arthritis  Abdominal   (+) obese                 Anesthesia Plan:   ASA:  3  Plan:   General  Airway:  ETT  Post-op Pain Management: IV analgesics  Informed Consent Plan and Risks Discussed With:  Patient  Use of Blood Products Discussed With:  Patient  Blood Product Use Consented        I have informed Shannon LUJAN Johnathan and/or legal guardian or family member of the nature of the anesthetic plan, benefits, risks including possible dental damage if relevant, major complications, and any alternative forms of anesthetic management.   All of the patient's questions were answered to the best of my ability. The patient desires the anesthetic management as planned.  Amara Badillo MD  2025 8:59 AM  Present on Admission:  **None**           [1]   Past Medical History:   Disorder of thyroid    Graves disease    High blood pressure    High cholesterol    History of blood clots    Bllod clotnin lung    Morbid obesity with BMI of 45.0-49.9, adult (HCC)    Obesity    Osteoarthritis    PONV (postoperative nausea and vomiting)    Pulmonary embolism (HCC)    Unspecified essential hypertension    Visual impairment    contacts and glasses   [2]   Past Surgical History:  Procedure Laterality Date          x3    Cholecystectomy  3/2023    Colonoscopy  2017    Endometrial ablation      Hernia  surgery      inguinal hernai repair    Hysterectomy      Hysteroscopy,with endometrial  10/01/2006    Oophorectomy      Removal gallbladder      Total abdominal hysterectomy  2107    Tubal ligation  09/01/2004   [3]   Medications Prior to Admission   Medication Sig Dispense Refill Last Dose/Taking    methIMAzole 5 MG Oral Tab Take 1 tablet (5 mg total) by mouth in the morning and 1 tablet (5 mg total) before bedtime. 180 tablet 0 6/25/2025 at  6:00 AM    fluticasone-salmeterol 250-50 MCG/ACT Inhalation Aerosol Powder, Breath Activated Inhale 1 puff into the lungs 2 (two) times daily. 180 each 1 6/24/2025 at  7:00 AM    Potassium Chloride ER 10 MEQ Oral Cap CR Take 1 capsule (10 mEq total) by mouth 2 (two) times daily. 180 capsule 3 6/24/2025 at  7:00 AM    HYDROCHLOROTHIAZIDE 25 MG Oral Tab TAKE 1 TABLET(25 MG) BY MOUTH DAILY (Patient taking differently: Take 1 tablet (25 mg total) by mouth every morning.) 90 tablet 1 6/24/2025 at  7:00 AM    Cholecalciferol (VITAMIN D) 50 MCG (2000 UT) Oral Cap Take by mouth in the morning.   6/24/2025 at  7:00 AM    budesonide 0.5 MG/2ML Inhalation Suspension Add to neilmed sinus rinse bottle along with distilled water to the line. Use half the bottle to irrigate one nasal cavity, and the other half to irrigate the other nasal cavity. Do this twice a day 120 mL 3    [4]   Current Facility-Administered Medications Ordered in Epic   Medication Dose Route Frequency Provider Last Rate Last Admin    lactated ringers infusion   Intravenous Continuous Arash Ken, DO         No current [facility]-ordered outpatient medications on file.   [5]   Allergies  Allergen Reactions    Neosporin Af [Miconazole Nitrate] RASH     CRREA-blisters    Sulfa Antibiotics OTHER (SEE COMMENTS)    Neomycin RASH    Sulfur-Salicylic Acid [Salicylic Acid-Sulfur] OTHER (SEE COMMENTS)     Cause the skin to bubble.   [6]   Family History  Problem Relation Age of Onset    Other (CAD) Father     Other (HTN) Maternal  Grandfather     Other (HTN) Mother     Cancer Mother     Hypertension Mother    [7]   Social History  Socioeconomic History    Marital status:    Tobacco Use    Smoking status: Never     Passive exposure: Past    Smokeless tobacco: Never   Vaping Use    Vaping status: Never Used   Substance and Sexual Activity    Alcohol use: Not Currently     Alcohol/week: 1.0 standard drink of alcohol    Drug use: No    Sexual activity: Yes   Other Topics Concern    Exercise No

## 2025-06-25 NOTE — ANESTHESIA PROCEDURE NOTES
Airway  Date/Time: 6/25/2025 9:46 AM  Reason: elective    Airway not difficult    General Information and Staff   Patient location during procedure: OR  Anesthesiologist: Amara Badillo MD  Performed: anesthesiologist   Performed by: Amara Badillo MD  Authorized by: Amaar Badillo MD        Indications and Patient Condition  Indications for airway management: anesthesia  Sedation level: deep      Preoxygenated: yesPatient position: sniffing  MILS not maintained throughout    Mask difficulty assessment: 0 - not attempted    Final Airway Details    Final airway type: endotracheal airway    Successful airway: ETT  Cuffed: yes   Successful intubation technique: direct laryngoscopy  Facilitating devices/methods: rapid sequence intubation  Endotracheal tube insertion site: oral  Blade: Jim  Blade size: #4  ETT size (mm): 7.0    Cormack-Lehane Classification: grade I - full view of glottis  Placement verified by: capnometry   Cuff volume (mL): 5  Measured from: lips  ETT to lips (cm): 22  Number of attempts at approach: 1  Number of other approaches attempted: 0

## 2025-06-26 ENCOUNTER — TELEPHONE (OUTPATIENT)
Dept: OTOLARYNGOLOGY | Facility: CLINIC | Age: 55
End: 2025-06-26

## 2025-06-26 VITALS
HEIGHT: 61 IN | WEIGHT: 248 LBS | HEART RATE: 78 BPM | OXYGEN SATURATION: 97 % | RESPIRATION RATE: 16 BRPM | SYSTOLIC BLOOD PRESSURE: 139 MMHG | DIASTOLIC BLOOD PRESSURE: 76 MMHG | BODY MASS INDEX: 46.82 KG/M2 | TEMPERATURE: 99 F

## 2025-06-26 LAB — CALCIUM BLD-MCNC: 8.7 MG/DL (ref 8.7–10.4)

## 2025-06-26 PROCEDURE — 99214 OFFICE O/P EST MOD 30 MIN: CPT | Performed by: INTERNAL MEDICINE

## 2025-06-26 RX ORDER — ACETAMINOPHEN 500 MG
500 TABLET ORAL EVERY 6 HOURS PRN
Status: DISCONTINUED | OUTPATIENT
Start: 2025-06-26 | End: 2025-06-26

## 2025-06-26 RX ORDER — LEVOTHYROXINE SODIUM 125 UG/1
125 TABLET ORAL
Qty: 30 TABLET | Refills: 0 | Status: SHIPPED | OUTPATIENT
Start: 2025-06-26

## 2025-06-26 NOTE — OCCUPATIONAL THERAPY NOTE
Orders received, chart reviewed for OT evaluation.  Pt has been up independently, to and from the washroom per pt and RN. Pt declined OT services. Pt does not demonstrate need for skilled OT at this time. Will sign-off.

## 2025-06-26 NOTE — PHYSICAL THERAPY NOTE
PT orders received, chart reviewed. Per discussion with RN and patient, she has been up ambulating independently to/from bathroom. Patient declined therapy services and feels at baseline, with no concerns regarding mobility and self-care. Will DC IP PT.

## 2025-06-26 NOTE — TELEPHONE ENCOUNTER
Pt is post op day 1 total thyroidectomy. Per pt is doing well and post op medications reviewed, advised pt that drain will not be removed until output is 30 ml or less within 24 hours. Advised pt that we will call her tomorrow. Also advised pt no showering until drain is removed. Pt to contact our office if any fever greater than 102, pt experiencing numbness or tingling of face or hands, pt verbalized understanding.

## 2025-06-26 NOTE — PLAN OF CARE
Shannon is alert and oriented x 4. She is up independently in the room. She is tolerating meals and swallowing pills whole with water. She denies pain, nausea or vomiting. IV removed. Discharge education complete  Problem: Patient Centered Care  Goal: Patient preferences are identified and integrated in the patient's plan of care  Description: Interventions:  - What would you like us to know as we care for you?   - Provide timely, complete, and accurate information to patient/family  - Incorporate patient and family knowledge, values, beliefs, and cultural backgrounds into the planning and delivery of care  - Encourage patient/family to participate in care and decision-making at the level they choose  - Honor patient and family perspectives and choices  Outcome: Adequate for Discharge     Problem: PAIN - ADULT  Goal: Verbalizes/displays adequate comfort level or patient's stated pain goal  Description: INTERVENTIONS:  - Encourage pt to monitor pain and request assistance  - Assess pain using appropriate pain scale  - Administer analgesics based on type and severity of pain and evaluate response  - Implement non-pharmacological measures as appropriate and evaluate response  - Consider cultural and social influences on pain and pain management  - Manage/alleviate anxiety  - Utilize distraction and/or relaxation techniques  - Monitor for opioid side effects  - Notify MD/LIP if interventions unsuccessful or patient reports new pain  - Anticipate increased pain with activity and pre-medicate as appropriate  Outcome: Adequate for Discharge     Problem: RISK FOR INFECTION - ADULT  Goal: Absence of fever/infection during anticipated neutropenic period  Description: INTERVENTIONS  - Monitor WBC  - Administer growth factors as ordered  - Implement neutropenic guidelines  Outcome: Adequate for Discharge

## 2025-06-26 NOTE — H&P
[place]  OTOLARYNGOLOGY - HEAD & NECK SURGERY    6/18/2025     Reason for Consultation:   Chronic cough    History of Present Illness:   Patient is a pleasant 55 year old female who is being seen for chronic cough which she developed after having COVID in 2022.  The patient states that prior to this she did not have any problems with chronic cough.  She does not have any history of asthma.  Since her COVID infection she has had chronic cough which has fluctuated.  At times it does bother her at night and at times she does not have an issue at night.  She has been seen by pulmonologist and was started on Atrovent nasal spray.  She was also seen by her endocrinologist  and was prescribed pantoprazole which she has not started yet.  She does have a history of allergic rhinitis but states that her cough does not correlate with her allergy symptoms.  She does not have any overt symptoms of gastroesophageal reflux.  She states that sometimes the cough is dry and at times it can be productive.    INTERVAL HISTORY  4/29/2025: The patient returns today to discuss her thyroid issue.  She does have a history of Graves' disease and has been on methimazole.  She states that she has had difficulty managing her levels.  She is considering her options at the moment.  She states that her thyroid gland seems to swell and shrink, and when it is swollen she does have compressive symptoms.  Additionally she has had issues with her voice.  States that she has time periods where her voice goes in and out.  Has been using the nasal sprays and states that her cough is significantly improved.  Also has been having some discomfort in her right ear and the sensation of pain in the infra-auricular area    6/6/2025     Shannon Mann is a 55 year old female with Graves' disease who presents with thyroid swelling and compressive symptoms.    She experiences increased neck swelling and vibrations in her neck while sleeping, which have  worsened with recent bronchitis. This has led to compressive symptoms, including back and neck discomfort. She has difficulty regulating her thyroid medication dosage, with doses being either too high or too low. Her bronchitis symptoms, including nasal mucus, have improved with treatment, but she remains on antibiotics, an inhaler, and cough medicine.    2025:  Patient is here today for surgery. No changes since office visit      Past Medical History  Past Medical History:    Disorder of thyroid    Graves disease    High blood pressure    High cholesterol    History of blood clots    Bllod clotnin lung    Morbid obesity with BMI of 45.0-49.9, adult (HCC)    Obesity    Osteoarthritis    PONV (postoperative nausea and vomiting)    Pulmonary embolism (HCC)    Unspecified essential hypertension    Visual impairment    contacts and glasses       Past Surgical History  Past Surgical History:   Procedure Laterality Date          x3    Cholecystectomy  3/2023    Colonoscopy      Endometrial ablation      Hernia surgery      inguinal hernai repair    Hysterectomy      Hysteroscopy,with endometrial  10/01/2006    Oophorectomy      Removal gallbladder      Total abdominal hysterectomy      Tubal ligation  2004       Family History  Family History   Problem Relation Age of Onset    Other (CAD) Father     Other (HTN) Maternal Grandfather     Other (HTN) Mother     Cancer Mother     Hypertension Mother        Social History  Pediatric History   Patient Parents    Ashley, Maricarmen (Mother)     Other Topics Concern     Service Not Asked    Blood Transfusions Not Asked    Caffeine Concern Not Asked    Occupational Exposure Not Asked    Hobby Hazards Not Asked    Sleep Concern Not Asked    Stress Concern Not Asked    Weight Concern Not Asked    Special Diet Not Asked    Back Care Not Asked    Exercise No    Bike Helmet Not Asked    Seat Belt Not Asked    Self-Exams Not Asked   Social History  Narrative    Not on file           Current Medications:  Current Outpatient Medications   Medication Sig Dispense Refill    levothyroxine 125 MCG Oral Tab Take 1 tablet (125 mcg total) by mouth before breakfast. Start 2 weeks after surgery. Confirm with Dr. Madison prior to starting 30 tablet 0       Allergies  Allergies   Allergen Reactions    Neosporin Af [Miconazole Nitrate] RASH     CRREA-blisters    Sulfa Antibiotics OTHER (SEE COMMENTS)    Neomycin RASH    Sulfur-Salicylic Acid [Salicylic Acid-Sulfur] OTHER (SEE COMMENTS)     Cause the skin to bubble.       Review of Systems:   A comprehensive 10 point review of systems was completed.  Pertinent positives and negatives noted in the the HPI.    Physical Exam:   Blood pressure 139/76, pulse 83, temperature 98.5 °F (36.9 °C), temperature source Oral, resp. rate 16, height 5' 1\" (1.549 m), weight 248 lb (112.5 kg), last menstrual period 01/23/2017, SpO2 95%, not currently breastfeeding.    GENERAL: No acute distress, Comfortable appearing  FACE: HB 1/6, Normal Animation  HEAD: Normocephalic  EYES: EOMI, pupils equil  EARS: Bilateral Auricles Symmetric, bilateral tympanic membranes appear normal  NOSE: Nares patent bilaterally  ORAL CAVITY: Tongue mobile, Oropharynx clear, Floor of mouth clear, Posterior oropharynx normal  NECK: No palpable lymphadenopathy, thyroid not palpable, nontender    PROCEDURE: FLEXIBLE FIBEROPTIC LARYNGOSCOPY (58794) -as performed on 4/29/2025    Due to inability for adequate examination of the larynx and need for magnification to perform the examination, endoscopy was performed.  Risks and benefits were discussed with patient/family and they have given verbal consent to proceed.    Procedure Detail & Findings:     After placement of topical anesthetic intranasally the flexible laryngoscope was inserted into the nare and driven through the nasal cavity with no significant abnormal findings noted in the nasal cavities or nasopharynx. The  oropharynx, hypopharynx and larynx were subsequently examined and the following findings were noted:    Nasal cavity and nasopharynx: The bilateral inferior turbinates appear to have pale edema, and the sinus cavities appear to have postoperative changes and are patent.  There are no obvious polyps noted.  There is a significant amount of postnasal drip noted along the turbinates and down through the nasopharynx.  There is also significant cobblestoning noted of the nasopharyngeal mucosa    Base of Tongue: Normal    Valeculla: Normal    Arytenoids: Normal    Introitus of the esophagus: There is still persistent moderate edema noted    Epiglottis: Normal    False vocal cords: Normal    True vocal cords: Normal    Subglottic space: Normal    Mobility of True vocal cords: Normal    Condition: Stable    Complications: Patient tolerated the procedure well with no immediate complication.      Results:     Laboratory Data:  Lab Results   Component Value Date    WBC 5.4 06/23/2025    HGB 12.9 06/23/2025    HCT 38.5 06/23/2025    .0 06/23/2025    CREATSERUM 0.69 06/23/2025    BUN 11 06/23/2025     06/23/2025    K 3.6 06/23/2025     06/23/2025    CO2 34.0 (H) 06/23/2025    GLU 96 06/23/2025    CA 9.5 06/25/2025    ALB 4.3 06/23/2025    ALKPHO 86 06/23/2025    TP 7.4 06/23/2025    AST 17 06/23/2025    ALT <7 (L) 06/23/2025    PTT 32.4 06/25/2024    INR 0.92 06/25/2024    PTP 12.4 06/25/2024    T4F 0.9 05/22/2025    TSH 0.015 (L) 05/22/2025    DELPHINE 158 (H) 03/12/2017    LIP 36 11/02/2024    DDIMER 0.61 (H) 06/25/2024    ESRML 59 (H) 10/25/2017    CRP 1.86 (H) 10/25/2017    TROP <0.046 10/01/2017    CK 69 01/18/2025    B12 702 04/15/2021         Imaging:  No results found.      Impression:       ICD-10-CM    1. Laryngopharyngeal reflux (LPR)  K21.9       2. Hypertrophy of nasal turbinates  J34.3       3. Allergic rhinitis due to pollen, unspecified seasonality  J30.1       4. Chronic cough  R05.3            Recommendations:     Thyroid disorder with compressive symptoms  Thyroid disorder with compressive symptoms, including neck swelling and difficulty regulating hormone levels. Symptoms include fatigue, neck swelling, and vibration, particularly during sleep. Thyroid surgery is considered due to these symptoms and regulation challenges. Surgery is not emergent but reasonable. Risks include potential vocal cord nerve damage (1% risk), temporary hoarseness, and hypocalcemia due to parathyroid gland stunning. Surgery requires a one-night hospital stay for calcium monitoring. Recovery involves avoiding strenuous activity for two weeks. She prefers surgery to alleviate symptoms and manage with one medication post-surgery. She has postoperative nausea and vomiting, which will be communicated to the anesthesiologist.  - And after discussion with the patient I do think that she is a good candidate for total thyroidectomy for her Graves' disease that she has had compressive symptoms managing medication doses  - Order updated thyroid ultrasound to assess size changes before surgery.  - Provide a handout on thyroid surgery for review.  - Coordinate with the scheduling team to discuss potential surgery dates.  - Inform anesthesiologist of her postoperative nausea and vomiting.    Chronic cough  Chronic cough previously managed with nasal rinse and budesonide irrigation, showing improvement. Current cough may be exacerbated by bronchitis.  - Continue management with nasal rinse and prescription as previously prescribed.    Bronchitis  Acute bronchitis with symptoms persisting for about a week, improving with current treatment regimen.  - Continue treatment with antibiotics, inhaler, and cough medicine as prescribed.    Pre-op Diagnosis: Graves disease [E05.00]    The above referenced H&P was reviewed by Arash Ken DO on 6/26/2025, the patient was examined and no significant changes have occurred in the patient's  condition since the H&P was performed.  I discussed with the patient and/or legal representative the potential benefits, risks and side effects of this procedure; the likelihood of the patient achieving goals; and potential problems that might occur during recuperation.  I discussed reasonable alternatives to the procedure, including risks, benefits and side effects related to the alternatives and risks related to not receiving this procedure.  We will proceed with procedure as planned.      Arash Ken,    Otolaryngology/Rhinology, Sinus, and Endoscopic Skull Base Surgery  [facility] [address] [place] [id number]  Phone [phone]  Fax [phone]  9/4/2024  7:49 PM  6/18/2025

## 2025-06-26 NOTE — PLAN OF CARE
Patient received from PACU. Axox4. Patient c/o sore pain, declines medication. Up self, voiding freely. Ivf infusing. Tolerating clears, SLF diet this am. Safety precautions in place.     Problem: PAIN - ADULT  Goal: Verbalizes/displays adequate comfort level or patient's stated pain goal  Description: INTERVENTIONS:  - Encourage pt to monitor pain and request assistance  - Assess pain using appropriate pain scale  - Administer analgesics based on type and severity of pain and evaluate response  - Implement non-pharmacological measures as appropriate and evaluate response  - Consider cultural and social influences on pain and pain management  - Manage/alleviate anxiety  - Utilize distraction and/or relaxation techniques  - Monitor for opioid side effects  - Notify MD/LIP if interventions unsuccessful or patient reports new pain  - Anticipate increased pain with activity and pre-medicate as appropriate  Outcome: Progressing     Problem: RISK FOR INFECTION - ADULT  Goal: Absence of fever/infection during anticipated neutropenic period  Description: INTERVENTIONS  - Monitor WBC  - Administer growth factors as ordered  - Implement neutropenic guidelines  Outcome: Progressing

## 2025-06-26 NOTE — DISCHARGE SUMMARY
[facility]  part of [facility]    DISCHARGE SUMMARY     Shannon Mann Patient Status:  Outpatient in a Bed    2/3/1970 MRN [id number]   Location [facility] 4W/SW/SE Attending Jess Man MD   Hosp Day # 0 PCP Yury Callaway MD     Date of Admission: 2025  Date of Discharge:  2025    Discharge Disposition: Home or Self Care    Discharge Diagnosis:     Post total thyroidectomy.     History of Present Illness:     55-year-old  female who has a history of Graves' disease, hyperthyroidism, with enlarging thyroid goiter bilaterally on ultrasound. Referred to ENT and scheduled today for the above-mentioned procedure by her surgeon, Dr. Ken. Postoperatively, transferred to PACU for further monitoring.     Brief Synopsis:     Thyroid goiter with hyperthyroidism and Graves' disease   status post total thyroidectomy on 25  Tolerating diet, pain controlled.   F/U with ENT in the office tomorrow.     Patient is to remain compliant with all discharge medications and instructions and to follow up as advised.   Patient encouraged to make healthy lifestyle and dietary changes.    Lace+ Score: 16  59-90 High Risk  29-58 Medium Risk  0-28   Low Risk       TCM Follow-Up Recommendation:  LACE < 29: Low Risk of readmission after discharge from the hospital. No TCM follow-up needed.      Procedures during hospitalization:    status post total thyroidectomy    Incidental or significant findings and recommendations (brief descriptions):  None    Lab/Test results pending at Discharge:   None    Consultants:      Discharge Medication List:     Discharge Medications        START taking these medications        Instructions Prescription details   levothyroxine 125 MCG Tabs  Commonly known as: Synthroid      Take 1 tablet (125 mcg total) by mouth before breakfast. Start 2 weeks after surgery. Confirm with Dr. Madison prior to starting   Quantity: 30 tablet  Refills: 0             CHANGE how you take these medications        Instructions Prescription details   hydroCHLOROthiazide 25 MG Tabs  What changed: when to take this      TAKE 1 TABLET(25 MG) BY MOUTH DAILY   Quantity: 90 tablet  Refills: 1            CONTINUE taking these medications        Instructions Prescription details   budesonide 0.5 MG/2ML Susp  Commonly known as: Pulmicort      Add to neilmed sinus rinse bottle along with distilled water to the line. Use half the bottle to irrigate one nasal cavity, and the other half to irrigate the other nasal cavity. Do this twice a day   Quantity: 120 mL  Refills: 3     fluticasone-salmeterol 250-50 MCG/ACT Aepb  Commonly known as: Advair Diskus      Inhale 1 puff into the lungs 2 (two) times daily.   Quantity: 180 each  Refills: 1     Potassium Chloride ER 10 MEQ Cpcr  Commonly known as: MICRO-K      Take 1 capsule (10 mEq total) by mouth 2 (two) times daily.   Quantity: 180 capsule  Refills: 3     Vitamin D 50 MCG (2000 UT) Caps      Take by mouth in the morning.   Refills: 0            STOP taking these medications      methIMAzole 5 MG Tabs  Commonly known as: Tapazole                  Where to Get Your Medications        These medications were sent to [id number] [place], [place] - [address] AT [facility] OF [facility] [address], [phone], [phone]  [address] [facility] [address]      Phone: [phone]   levothyroxine 125 MCG Tabs         [place]    Follow-up appointment:   No follow-up provider specified.  Appointments for Next 30 Days 6/26/2025 - 7/26/2025      None            Vital signs:  Temp:  [97.4 °F (36.3 °C)-99.1 °F (37.3 °C)] 98.7 °F (37.1 °C)  Pulse:  [] 78  Resp:  [14-20] 16  BP: (130-180)/() 139/76  SpO2:  [94 %-100 %] 97 %    Physical Exam:    Gen: NAD AO x3  Chest: good air entry CTABL  CVS: normal s1 and s2 RR  Abd: NABS soft NT ND  Neuro: CN 2-12 grossly intact  Ext: no edema in bilateral  PUSHPA    -----------------------------------------------------------------------------------------------  PATIENT DISCHARGE INSTRUCTIONS: See electronic chart    Jess Man MD  Hospitalist    Time spent:  > 30 minutes    The 21st Century Cures Act makes medical notes like these available to patients in the interest of transparency. Please be advised this is a medical document. Medical documents are intended to carry relevant information, facts as evident, and the clinical opinion of the practitioner. The medical note is intended as peer to peer communication and may appear blunt or direct. It is written in medical language and may contain abbreviations or verbiage that are unfamiliar.

## 2025-06-27 ENCOUNTER — TELEPHONE (OUTPATIENT)
Dept: OTOLARYNGOLOGY | Facility: CLINIC | Age: 55
End: 2025-06-27

## 2025-06-27 NOTE — TELEPHONE ENCOUNTER
Advised ,per patient regarding drain output. She emptied last night at 7 pm was 1 oz (about 30 ml) and this morning at 7 am again 1 oz, color dark red color. No providers in tomorrow. ok to have pt come in on Monday for drain removal. Advised pt to continue continue to monitor out put and will call early Monday morning for that output.

## 2025-06-27 NOTE — TELEPHONE ENCOUNTER
Patient asking if sh can take over the counter ibuprofen for pain. Per   Yes, she can take up to 600mg every 8 hours. I would prefer regular tylenol as there is no bleeding risk. Ibuprofen has a slight blood thinning effect but is generally safe after the first 24-48 hours   Pt informed of  recommendations    No

## 2025-06-30 ENCOUNTER — NURSE ONLY (OUTPATIENT)
Dept: OTOLARYNGOLOGY | Facility: CLINIC | Age: 55
End: 2025-06-30
Payer: COMMERCIAL

## 2025-06-30 VITALS — HEART RATE: 86 BPM | TEMPERATURE: 98 F | DIASTOLIC BLOOD PRESSURE: 103 MMHG | SYSTOLIC BLOOD PRESSURE: 159 MMHG

## 2025-06-30 DIAGNOSIS — Z48.03 CHANGE OR REMOVAL OF DRAINS: Primary | ICD-10-CM

## 2025-06-30 PROCEDURE — 3080F DIAST BP >= 90 MM HG: CPT | Performed by: STUDENT IN AN ORGANIZED HEALTH CARE EDUCATION/TRAINING PROGRAM

## 2025-06-30 PROCEDURE — 3077F SYST BP >= 140 MM HG: CPT | Performed by: STUDENT IN AN ORGANIZED HEALTH CARE EDUCATION/TRAINING PROGRAM

## 2025-06-30 PROCEDURE — 99024 POSTOP FOLLOW-UP VISIT: CPT | Performed by: STUDENT IN AN ORGANIZED HEALTH CARE EDUCATION/TRAINING PROGRAM

## 2025-06-30 NOTE — TELEPHONE ENCOUNTER
Per pt NARA drain output last night was less than 10 ml and this morning less than 10 ml. Pt scheduled for j/p drain removal this morning. Per  pt have to have drain removed today.

## 2025-06-30 NOTE — PROGRESS NOTES
Pt here for NARA drain removal to left neck  Pt identified w/2 identifiers and orders verified for NARA drain removal.  Pt assisted to exam chair and positioned for possible NARA drain removal.  Pt presents w/and NARA bulb to suction.  Pt denies c/o pain, use of analgesics, and s/s infection.  Pt brought log w/NARA outputs.  Outputs for last 3 days/24-hour periods in cc: <10  Dressings partially removed carefully.   notified of NARA outputs and telephone order to **remove drain per standing orders.  Suction to NARA drain released.  Sutures to NARA drain removed without difficulty.  NARA drain tubing steadily pulled out w/minimal resistance while counter pressure applied to skin near drain.  Pt tolerated procedure well; no complications.  Wound w/minimal serosanguinous drainage; dressing applied over the wound site and secured w/tape.  NARA tubing w/cut end and intact.  Pt instructed to call the office if he/she needs a dressing adjustment.  Pt educated on signs of symptoms of infections  Pt also instructed to call the office w/any questions and/or concerns.  Pt verbalized an understanding.  Pt reminded of appt on 07/02/2025.

## 2025-07-02 ENCOUNTER — OFFICE VISIT (OUTPATIENT)
Facility: LOCATION | Age: 55
End: 2025-07-02
Payer: COMMERCIAL

## 2025-07-02 DIAGNOSIS — E05.00 GRAVES DISEASE: Primary | ICD-10-CM

## 2025-07-02 PROCEDURE — 99024 POSTOP FOLLOW-UP VISIT: CPT | Performed by: STUDENT IN AN ORGANIZED HEALTH CARE EDUCATION/TRAINING PROGRAM

## 2025-07-02 NOTE — PROGRESS NOTES
[place]  OTOLARYNGOLOGY - HEAD & NECK SURGERY    7/2/2025     Reason for Consultation:   Chronic cough    History of Present Illness:   Patient is a pleasant 55 year old female who is being seen for chronic cough which she developed after having COVID in 2022.  The patient states that prior to this she did not have any problems with chronic cough.  She does not have any history of asthma.  Since her COVID infection she has had chronic cough which has fluctuated.  At times it does bother her at night and at times she does not have an issue at night.  She has been seen by pulmonologist and was started on Atrovent nasal spray.  She was also seen by her endocrinologist  and was prescribed pantoprazole which she has not started yet.  She does have a history of allergic rhinitis but states that her cough does not correlate with her allergy symptoms.  She does not have any overt symptoms of gastroesophageal reflux.  She states that sometimes the cough is dry and at times it can be productive.    INTERVAL HISTORY  4/29/2025: The patient returns today to discuss her thyroid issue.  She does have a history of Graves' disease and has been on methimazole.  She states that she has had difficulty managing her levels.  She is considering her options at the moment.  She states that her thyroid gland seems to swell and shrink, and when it is swollen she does have compressive symptoms.  Additionally she has had issues with her voice.  States that she has time periods where her voice goes in and out.  Has been using the nasal sprays and states that her cough is significantly improved.  Also has been having some discomfort in her right ear and the sensation of pain in the infra-auricular area    6/6/2025     Shannon Mann is a 55 year old female with Graves' disease who presents with thyroid swelling and compressive symptoms.    She experiences increased neck swelling and vibrations in her neck while sleeping, which have  worsened with recent bronchitis. This has led to compressive symptoms, including back and neck discomfort. She has difficulty regulating her thyroid medication dosage, with doses being either too high or too low. Her bronchitis symptoms, including nasal mucus, have improved with treatment, but she remains on antibiotics, an inhaler, and cough medicine.    2025    He returns today 1 week postop from total thyroidectomy.  She is here for suture removal.  Has been feeling better day by day      Past Medical History  Past Medical History:    Disorder of thyroid    Graves disease    High blood pressure    High cholesterol    History of blood clots    Bllod clotnin lung    Morbid obesity with BMI of 45.0-49.9, adult (HCC)    Obesity    Osteoarthritis    PONV (postoperative nausea and vomiting)    Pulmonary embolism (HCC)    Unspecified essential hypertension    Visual impairment    contacts and glasses       Past Surgical History  Past Surgical History:   Procedure Laterality Date          x3    Cholecystectomy  3/2023    Colonoscopy  2017    Endometrial ablation      Hernia surgery      inguinal hernai repair    Hysterectomy      Hysteroscopy,with endometrial  10/01/2006    Oophorectomy      Removal gallbladder      Thyroidectomy  2025    Total thyroidectomy    Total abdominal hysterectomy      Tubal ligation  2004       Family History  Family History   Problem Relation Age of Onset    Other (CAD) Father     Other (HTN) Maternal Grandfather     Other (HTN) Mother     Cancer Mother     Hypertension Mother        Social History  Pediatric History   Patient Parents    Ashley, Maricarmen (Mother)     Other Topics Concern     Service Not Asked    Blood Transfusions Not Asked    Caffeine Concern Not Asked    Occupational Exposure Not Asked    Hobby Hazards Not Asked    Sleep Concern Not Asked    Stress Concern Not Asked    Weight Concern Not Asked    Special Diet Not Asked    Back Care Not  Asked    Exercise No    Bike Helmet Not Asked    Seat Belt Not Asked    Self-Exams Not Asked   Social History Narrative    Not on file           Current Medications:  Current Outpatient Medications   Medication Sig Dispense Refill    levothyroxine 125 MCG Oral Tab Take 1 tablet (125 mcg total) by mouth before breakfast. Start 2 weeks after surgery. Confirm with Dr. Madison prior to starting 30 tablet 0    budesonide 0.5 MG/2ML Inhalation Suspension Add to neilmed sinus rinse bottle along with distilled water to the line. Use half the bottle to irrigate one nasal cavity, and the other half to irrigate the other nasal cavity. Do this twice a day 120 mL 3    fluticasone-salmeterol 250-50 MCG/ACT Inhalation Aerosol Powder, Breath Activated Inhale 1 puff into the lungs 2 (two) times daily. 180 each 1    Potassium Chloride ER 10 MEQ Oral Cap CR Take 1 capsule (10 mEq total) by mouth 2 (two) times daily. 180 capsule 3    HYDROCHLOROTHIAZIDE 25 MG Oral Tab TAKE 1 TABLET(25 MG) BY MOUTH DAILY (Patient taking differently: Take 1 tablet (25 mg total) by mouth every morning.) 90 tablet 1    Cholecalciferol (VITAMIN D) 50 MCG (2000 UT) Oral Cap Take by mouth in the morning.         Allergies  Allergies   Allergen Reactions    Neosporin Af [Miconazole Nitrate] RASH     CRREA-blisters    Sulfa Antibiotics OTHER (SEE COMMENTS)    Neomycin RASH    Sulfur-Salicylic Acid [Salicylic Acid-Sulfur] OTHER (SEE COMMENTS)     Cause the skin to bubble.       Review of Systems:   A comprehensive 10 point review of systems was completed.  Pertinent positives and negatives noted in the the HPI.    Physical Exam:   Last menstrual period 01/23/2017, not currently breastfeeding.    GENERAL: No acute distress, Comfortable appearing  FACE: HB 1/6, Normal Animation  HEAD: Normocephalic  EYES: EOMI, pupils equil  EARS: Bilateral Auricles Symmetric, bilateral tympanic membranes appear normal  NOSE: Nares patent bilaterally  ORAL CAVITY: Tongue mobile,  Oropharynx clear, Floor of mouth clear, Posterior oropharynx normal  NECK: Incision is clean dry and intact, drain site is healing well    PROCEDURE: FLEXIBLE FIBEROPTIC LARYNGOSCOPY (68736) -as performed on 4/29/2025    Due to inability for adequate examination of the larynx and need for magnification to perform the examination, endoscopy was performed.  Risks and benefits were discussed with patient/family and they have given verbal consent to proceed.    Procedure Detail & Findings:     After placement of topical anesthetic intranasally the flexible laryngoscope was inserted into the nare and driven through the nasal cavity with no significant abnormal findings noted in the nasal cavities or nasopharynx. The oropharynx, hypopharynx and larynx were subsequently examined and the following findings were noted:    Nasal cavity and nasopharynx: The bilateral inferior turbinates appear to have pale edema, and the sinus cavities appear to have postoperative changes and are patent.  There are no obvious polyps noted.  There is a significant amount of postnasal drip noted along the turbinates and down through the nasopharynx.  There is also significant cobblestoning noted of the nasopharyngeal mucosa    Base of Tongue: Normal    Valeculla: Normal    Arytenoids: Normal    Introitus of the esophagus: There is still persistent moderate edema noted    Epiglottis: Normal    False vocal cords: Normal    True vocal cords: Normal    Subglottic space: Normal    Mobility of True vocal cords: Normal    Condition: Stable    Complications: Patient tolerated the procedure well with no immediate complication.      Results:     Laboratory Data:  Lab Results   Component Value Date    WBC 5.4 06/23/2025    HGB 12.9 06/23/2025    HCT 38.5 06/23/2025    .0 06/23/2025    CREATSERUM 0.69 06/23/2025    BUN 11 06/23/2025     06/23/2025    K 3.6 06/23/2025     06/23/2025    CO2 34.0 (H) 06/23/2025    GLU 96 06/23/2025    CA 8.7  06/26/2025    ALB 4.3 06/23/2025    ALKPHO 86 06/23/2025    TP 7.4 06/23/2025    AST 17 06/23/2025    ALT <7 (L) 06/23/2025    PTT 32.4 06/25/2024    INR 0.92 06/25/2024    PTP 12.4 06/25/2024    T4F 0.9 05/22/2025    TSH 0.015 (L) 05/22/2025    DELPHINE 158 (H) 03/12/2017    LIP 36 11/02/2024    DDIMER 0.61 (H) 06/25/2024    ESRML 59 (H) 10/25/2017    CRP 1.86 (H) 10/25/2017    TROP <0.046 10/01/2017    CK 69 01/18/2025    B12 702 04/15/2021         Imaging:  No results found.      Impression:       ICD-10-CM    1. Laryngopharyngeal reflux (LPR)  K21.9       2. Hypertrophy of nasal turbinates  J34.3       3. Allergic rhinitis due to pollen, unspecified seasonality  J30.1       4. Chronic cough  R05.3           Recommendations:     The patient is doing quite well following total thyroidectomy for Graves' disease and goiter with compressive symptoms.  She will continue levothyroxine for now and follow-up with Dr. Madison.  She will return to see me as needed        Arash Ken, DO   Otolaryngology/Rhinology, Sinus, and Endoscopic Skull Base Surgery  [facility] [address] [place] [id number]  Phone [phone]  Fax [phone]  9/4/2024  7:49 PM  7/2/2025

## 2025-07-02 NOTE — PROGRESS NOTES
The following individual(s) verbally consented to be recorded using ambient AI listening technology and understand that they can each withdraw their consent to this listening technology at any point by asking the clinician to turn off or pause the recording: yes patient consents     Patient name: Shannon LUJAN Johnathan

## 2025-07-24 ENCOUNTER — PATIENT MESSAGE (OUTPATIENT)
Facility: LOCATION | Age: 55
End: 2025-07-24

## 2025-07-24 ENCOUNTER — TELEPHONE (OUTPATIENT)
Dept: ENDOCRINOLOGY CLINIC | Facility: CLINIC | Age: 55
End: 2025-07-24

## 2025-07-24 DIAGNOSIS — Z90.710 S/P HYSTERECTOMY: ICD-10-CM

## 2025-07-24 DIAGNOSIS — I10 PRIMARY HYPERTENSION: ICD-10-CM

## 2025-07-24 DIAGNOSIS — Z86.711 HISTORY OF PULMONARY EMBOLISM: ICD-10-CM

## 2025-07-24 DIAGNOSIS — Z90.49 HISTORY OF CHOLECYSTECTOMY: ICD-10-CM

## 2025-07-24 DIAGNOSIS — E05.00 GRAVES DISEASE: ICD-10-CM

## 2025-07-24 DIAGNOSIS — E66.01 MORBID OBESITY (HCC): ICD-10-CM

## 2025-07-24 DIAGNOSIS — R73.03 PREDIABETES: ICD-10-CM

## 2025-07-24 DIAGNOSIS — E78.5 HYPERLIPIDEMIA, UNSPECIFIED HYPERLIPIDEMIA TYPE: ICD-10-CM

## 2025-07-24 DIAGNOSIS — E05.90 HYPERTHYROIDISM: ICD-10-CM

## 2025-07-24 RX ORDER — LEVOTHYROXINE SODIUM 125 UG/1
125 TABLET ORAL
Qty: 30 TABLET | Refills: 0 | OUTPATIENT
Start: 2025-07-24

## 2025-07-24 RX ORDER — HYDROCHLOROTHIAZIDE 25 MG/1
25 TABLET ORAL EVERY MORNING
Qty: 90 TABLET | Refills: 1 | Status: SHIPPED | OUTPATIENT
Start: 2025-07-24 | End: 2025-07-25

## 2025-07-24 NOTE — TELEPHONE ENCOUNTER
Endocrine Refill protocol for oral antihypertensive medications    Protocol Criteria:  PASSED  Reason: N/A     If all below requirements are met, send a 90-day supply with 1 refill per provider protocol.    Verify appointment with Endocrinology completed in the last 6 months or scheduled in the next 3 months.  Verify BMP or CMP completed in the last 12 months   Verify last GFR result is greater than or equal to 50     Last completed office visit:5/22/2025 Sandra Madison MD   Last completed telemed visit: Visit date not found  Next scheduled Follow up: No future appointments.   Last BMP or CMP completion date:  Lab Results   Component Value Date    GFRAA 88 11/20/2021    GFRNAA 76 11/20/2021    EGFRCR 104 06/23/2025

## 2025-07-24 NOTE — TELEPHONE ENCOUNTER
RN pended levothyroxine prescription to [facility] per pt request, awaiting signature and approval from Dr. Ken.

## 2025-07-24 NOTE — TELEPHONE ENCOUNTER
Per patient was advised by Dr. Ken to follow up with Dr. Madison after thyroidectomy, no openings until 10/21, patient states she needs to be seen sooner. Please advise thank you.

## 2025-07-25 RX ORDER — HYDROCHLOROTHIAZIDE 25 MG/1
25 TABLET ORAL EVERY MORNING
Qty: 90 TABLET | Refills: 1 | Status: SHIPPED | OUTPATIENT
Start: 2025-07-25

## 2025-07-25 RX ORDER — LEVOTHYROXINE SODIUM 125 UG/1
125 TABLET ORAL
Qty: 90 TABLET | Refills: 0 | Status: SHIPPED | OUTPATIENT
Start: 2025-07-25

## 2025-07-25 NOTE — TELEPHONE ENCOUNTER
Recent refill for hydrochlorothiazide was sent by DR. Madison to the patient's retail pharmacy. Prescription resent to mail order per patient request.     Endocrine Refill protocol for oral antihypertensive medications    Protocol Criteria:  PASSED       If all below requirements are met, send a 90-day supply with 1 refill per provider protocol.    Verify appointment with Endocrinology completed in the last 6 months or scheduled in the next 3 months.  Verify BMP or CMP completed in the last 12 months   Verify last GFR result is greater than or equal to 50     Last completed office visit:5/22/2025 Sandra Madison MD   Last completed telemed visit: Visit date not found  Next scheduled Follow up:   Future Appointments   Date Time Provider Department Center   8/5/2025  9:00 AM [facility]      Last BMP or CMP completion date:  Lab Results   Component Value Date    GFRAA 88 11/20/2021    GFRNAA 76 11/20/2021    EGFRCR 104 06/23/2025

## 2025-07-26 NOTE — TELEPHONE ENCOUNTER
Chart reviewed and it looks like Dr. Ken refilled pt's levothyroxine today.  Responded to patient with this information.

## 2025-08-05 ENCOUNTER — LAB ENCOUNTER (OUTPATIENT)
Dept: LAB | Age: 55
End: 2025-08-05
Attending: INTERNAL MEDICINE

## 2025-08-05 ENCOUNTER — OFFICE VISIT (OUTPATIENT)
Facility: LOCATION | Age: 55
End: 2025-08-05

## 2025-08-05 VITALS
HEIGHT: 61 IN | DIASTOLIC BLOOD PRESSURE: 80 MMHG | SYSTOLIC BLOOD PRESSURE: 124 MMHG | BODY MASS INDEX: 47.01 KG/M2 | WEIGHT: 249 LBS | HEART RATE: 96 BPM

## 2025-08-05 DIAGNOSIS — Z90.710 S/P HYSTERECTOMY: ICD-10-CM

## 2025-08-05 DIAGNOSIS — E07.9 THYROID DISEASE: ICD-10-CM

## 2025-08-05 DIAGNOSIS — E89.0 POSTOPERATIVE HYPOTHYROIDISM: ICD-10-CM

## 2025-08-05 DIAGNOSIS — E05.00 GRAVES DISEASE: Primary | ICD-10-CM

## 2025-08-05 DIAGNOSIS — R73.03 PREDIABETES: ICD-10-CM

## 2025-08-05 DIAGNOSIS — E66.01 MORBID OBESITY (HCC): ICD-10-CM

## 2025-08-05 DIAGNOSIS — E05.00 GRAVES' ORBITOPATHY: ICD-10-CM

## 2025-08-05 LAB — TSI SER-ACNC: 4.12 UIU/ML (ref 0.55–4.78)

## 2025-08-05 PROCEDURE — 99214 OFFICE O/P EST MOD 30 MIN: CPT | Performed by: INTERNAL MEDICINE

## 2025-08-05 PROCEDURE — 3008F BODY MASS INDEX DOCD: CPT | Performed by: INTERNAL MEDICINE

## 2025-08-05 PROCEDURE — 36415 COLL VENOUS BLD VENIPUNCTURE: CPT

## 2025-08-05 PROCEDURE — 84443 ASSAY THYROID STIM HORMONE: CPT

## 2025-08-05 PROCEDURE — 3079F DIAST BP 80-89 MM HG: CPT | Performed by: INTERNAL MEDICINE

## 2025-08-05 PROCEDURE — 3074F SYST BP LT 130 MM HG: CPT | Performed by: INTERNAL MEDICINE

## 2025-08-05 RX ORDER — LEVOTHYROXINE SODIUM 125 UG/1
125 TABLET ORAL
Qty: 90 TABLET | Refills: 0 | Status: SHIPPED | OUTPATIENT
Start: 2025-08-05

## (undated) DIAGNOSIS — I10 ESSENTIAL HYPERTENSION: ICD-10-CM

## (undated) DEVICE — #10 STERILE BLADE: Brand: POLYMER COATED BLADES

## (undated) DEVICE — DRESSING AQUACEL AG 3.5 X 10

## (undated) DEVICE — TROCAR: Brand: KII FIOS FIRST ENTRY

## (undated) DEVICE — PREMIUM WET SKIN PREP TRAY: Brand: MEDLINE INDUSTRIES, INC.

## (undated) DEVICE — TROCAR: Brand: KII® SLEEVE

## (undated) DEVICE — DRAIN SURG W7MMXL20CM SIL HUBLESS FLAT FLL

## (undated) DEVICE — DRAPE,T,LAPARO,TRANS,STERILE: Brand: MEDLINE

## (undated) DEVICE — SPONGE: SPECIALTY PEANUT XR 100/CS: Brand: MEDICAL ACTION INDUSTRIES

## (undated) DEVICE — LAPAROVUE VISIBILITY SYSTEM LAPAROSCOPIC SOLUTIONS: Brand: LAPAROVUE

## (undated) DEVICE — LIGASURE EXACT DISSECTOR: Brand: LIGASURE

## (undated) DEVICE — TRAY SURESTEP 16 BARDEX UMETR

## (undated) DEVICE — C-ARM: Brand: UNBRANDED

## (undated) DEVICE — KENDALL SCD EXPRESS SLEEVES, KNEE LENGTH, MEDIUM: Brand: KENDALL SCD

## (undated) DEVICE — LAP CHOLE/APPY CDS-LF: Brand: MEDLINE INDUSTRIES, INC.

## (undated) DEVICE — HEAD AND NECK: Brand: MEDLINE INDUSTRIES, INC.

## (undated) DEVICE — SLEEVE KENDALL SCD EXPRESS MED

## (undated) DEVICE — LAPAROTOMY SPONGE - RF AND X-RAY DETECTABLE PRE-WASHED: Brand: SITUATE

## (undated) DEVICE — GLOVE SURG SENSICARE SZ 6

## (undated) DEVICE — 3M™ TEGADERM™ TRANSPARENT FILM DRESSING FRAME STYLE, 1626W, 4 IN X 4-3/4 IN (10 CM X 12 CM), 50/CT 4CT/CASE: Brand: 3M™ TEGADERM™

## (undated) DEVICE — LAPAROTOMY CDS: Brand: MEDLINE INDUSTRIES, INC.

## (undated) DEVICE — 40580 - THE PINK PAD - ADVANCED TRENDELENBURG POSITIONING KIT: Brand: 40580 - THE PINK PAD - ADVANCED TRENDELENBURG POSITIONING KIT

## (undated) DEVICE — VIOLET BRAIDED (POLYGLACTIN 910), SYNTHETIC ABSORBABLE SUTURE: Brand: COATED VICRYL

## (undated) DEVICE — SHEET,DRAPE,40X58,STERILE: Brand: MEDLINE

## (undated) DEVICE — SUT PERMA- 2-0 18IN FS NABSRB BLK 26MM 3/8

## (undated) DEVICE — APPLICATOR PREP 26ML CHG 2% ISO ALC 70%

## (undated) DEVICE — ENDOCUT SCISSOR TIP, DISPOSABLE: Brand: RENEW

## (undated) DEVICE — TRADITIONAL MARYLAND DISSECTOR TIP, DISPOSABLE: Brand: RENEW

## (undated) DEVICE — SUT CHRM GUT 3-0 27IN SH ABSRB UD 26MM 1/2

## (undated) DEVICE — SUTURE CHRM GUT 4-0 18IN ABSRB UD 17MM J-1

## (undated) DEVICE — 3M™ STERI-STRIP™ REINFORCED ADHESIVE SKIN CLOSURES, R1547, 1/2 IN X 4 IN (12 MM X 100 MM), 6 STRIPS/ENVELOPE: Brand: 3M™ STERI-STRIP™

## (undated) DEVICE — PACK CUSTTOM NECK ACCESSORY

## (undated) DEVICE — CLIP HEMOLOK MEDIUM GREEN

## (undated) DEVICE — SOL NACL IRRIG 0.9% 1000ML BTL

## (undated) DEVICE — SUT ETHLN 3-0 18IN PS-2 NABSRB BLK 19MM 3/8 C

## (undated) DEVICE — SUCTION CANISTER, 3000CC,SAFELINER: Brand: DEROYAL

## (undated) DEVICE — SUTURE VICRYL 0 CT-1

## (undated) DEVICE — GOWN,SIRUS,FABRNF,RAGLAN,XL,ST,28/CS: Brand: MEDLINE

## (undated) DEVICE — STERILE POLYISOPRENE POWDER-FREE SURGICAL GLOVES: Brand: PROTEXIS

## (undated) DEVICE — GRABBER GRASPER TIP, DISPOSABLE: Brand: RENEW

## (undated) DEVICE — TIGERTAIL 5F FLXTIP 70CM

## (undated) DEVICE — #11 STERILE BLADE: Brand: POLYMER COATED BLADES

## (undated) DEVICE — SUTURE VICRYL 2-0 CT-1

## (undated) DEVICE — SUT MONOCRYL 4-0 PS-2 Y496G

## (undated) DEVICE — SOLUTION IRRIG 1000ML 0.9% NACL USP BTL

## (undated) DEVICE — TROCAR: Brand: KII SHIELDED BLADED ACCESS SYSTEM

## (undated) DEVICE — ZIPWIRE GUIDEWIRE 035X150 STR

## (undated) DEVICE — SUTURE CHROMIC GUT 3-0 SH

## (undated) DEVICE — SUT PROL 5-0 18IN P-3 NABSRB BLU L13MM 3/8 CI

## (undated) DEVICE — SUTURE VICRYL 4-0 KS

## (undated) DEVICE — PDS II VLT 0 107CM AG ST3: Brand: ENDOLOOP

## (undated) DEVICE — ABSORBABLE HEMOSTAT (OXIDIZED REGENERATED CELLULOSE): Brand: SURGICEL

## (undated) DEVICE — POUCH SPECIMEN WIRE 6X3 250ML

## (undated) DEVICE — SUTURE VICRYL 0

## (undated) DEVICE — SUT PDS II 0 CT-2 Z334H

## (undated) DEVICE — GAMMEX® PI HYBRID SIZE 7.5, STERILE POWDER-FREE SURGICAL GLOVE, POLYISOPRENE AND NEOPRENE BLEND: Brand: GAMMEX

## (undated) DEVICE — EVACUATOR SUR 100CC SIL BLB WND

## (undated) DEVICE — CLIP SM INTNL HMCLP TNTLM ESCP

## (undated) DEVICE — INSULATED BLADE ELECTRODE: Brand: EDGE

## (undated) DEVICE — SUT PERMA- 2-0 18IN NABSRB BLK TIE SILK

## (undated) DEVICE — LIGHT HANDLE

## (undated) DEVICE — GAUZE,SPONGE,4"X4",16PLY,XRAY,STRL,LF: Brand: MEDLINE

## (undated) DEVICE — DERMABOND CLOSURE 0.7ML TOPICL

## (undated) DEVICE — GAMMEX® PI HYBRID SIZE 8, STERILE POWDER-FREE SURGICAL GLOVE, POLYISOPRENE AND NEOPRENE BLEND: Brand: GAMMEX

## (undated) DEVICE — SOL  .9 1000ML BTL

## (undated) DEVICE — L-HOOK CAUTERY PROBE TIP, DISPOSABLE: Brand: RENEW

## (undated) DEVICE — ENDOPATH ULTRA VERESS INSUFFLATION NEEDLES WITH LUER LOCK CONNECTORS: Brand: ENDOPATH

## (undated) DEVICE — Device

## (undated) NOTE — MR AVS SNAPSHOT
[facility]  [address] [facility] [address]  [address] [place] [id number]               Thank you for choosing us for your health care visit with Carla Penaloza MD.  We are glad to serve you and happy to provide you with this sum If you have questions, you can call [phone] to talk to our [facility] Staff. Remember, [employer] is NOT to be used for urgent needs. For medical emergencies, dial 911. Visit [email or web address] to learn more.            Visit [email or web address]

## (undated) NOTE — Clinical Note
2017      Clotilde Chatterjee        :  2/3/1970        To Whom It May Concern:    Clotilde Chatterjee  has recently been treated for a medical condition. At this time, I have determined she may return to work effective 2017, without restrictions.

## (undated) NOTE — LETTER
Date & Time: 2/17/2025, 9:32 AM  Patient: Shannon Mann  Encounter Provider(s):    Johnathan Villeda MD       To Whom It May Concern:    Shannon Mann was seen and treated in our department on 2/17/2025. She should not return to work until 2/19/2025 .    If you have any questions or concerns, please do not hesitate to call.        _____________________________  Physician/APC Signature

## (undated) NOTE — LETTER
Date & Time: 3/6/2024, 7:05 PM  Patient: Shannon Mann  Encounter Provider(s):    Soco Pablo PA       To Whom It May Concern:    Shannon Mann was seen and treated in our department on 3/6/2024. She may return to work when fever free for 24 hours prior.    If you have any questions or concerns, please do not hesitate to call.        _____________________________  Physician/APC Signature

## (undated) NOTE — MR AVS SNAPSHOT
[facility] [address] [address] [phone]               Thank you for choosing us for your health care visit with LUCIE Ocampo.   We are glad to serve you and happy to provide you with this summar Call the [employer] for assistance with your inactive [employer] account    If you have questions, you can call [phone] to talk to our [facility] Staff. Remember, [employer] is NOT to be used for urgent needs. For medical emergencies, dial 911.     Vi

## (undated) NOTE — MR AVS SNAPSHOT
[facility]  [address] [address] [phone]               Thank you for choosing us for your health care visit with Steve Powell MD.  We are glad to serve you and happy to provide you with this summary of y [place] - [address] [facility] [address], [phone], [address], [address]     Phone:  [phone]    - PEG 3350-KCl-Na Bicarb-NaCl 420 g Solr            MyChart     Call the selenak for assistance with

## (undated) NOTE — LETTER
23    Patient: Jordon Arevalo  : 2/3/1970 Visit date: 2023    Dear  Jocelyn Rubio MD    Thank you for referring Jordon Arevalo to my practice. Please find my assessment and plan below. Assessment   Chronic cholecystitis  (primary encounter diagnosis)      Plan     The patient is recovering nicely following laparoscopic cholecystectomy with cholangiogram.    The anticipated postoperative recovery was discussed with the patient in detail. Dietary, activity, and exercise recommendations along with restrictions were discussed with the patient during today's visit. Wound care instructions were discussed during today's visit. The patient will return to my attention on an as needed basis. The patient is encouraged to continue seeing the primary care physician for ongoing medical needs. The patient was given ample opportunity to ask questions. The patient's questions were answered in detail and to the patient's satisfaction. The patient voiced understanding of the postoperative care plan.              Sincerely,       Amy Solorio MD   CC:   No Recipients

## (undated) NOTE — Clinical Note
2017    Patient: Clotilde Chatterjee  : 2/3/1970 Visit date: 3/23/2017    Dear  Dr. Jyoti Salcedo MD,    Thank you for referring Clotilde Chatterjee to my practice. Please find my assessment and plan below.        Assessment:         The patient is a 41 year o

## (undated) NOTE — LETTER
7/2/2025          To Whom It May Concern:    Shannon Mann is currently under my medical care and may not return to work at this time.    Please excuse Shannon until 8/18/25  Activity is restricted as follows: none.    If you require additional information please contact our office.        Sincerely,    Arash Ken, DO

## (undated) NOTE — IP AVS SNAPSHOT
[facility] [place] [address] [place], [address] ~ [phone]                Discharge Summary   3/10/2017    Bonny Varela           Admission Information        Provider Department    3/10/2017 Kiko Persaud MD  [id number]         Trace Christine Take 1 tablet by mouth daily.                                  Where to Get Your Medications      Please  your prescriptions at the location directed by your doctor or nurse     Bring a paper prescription for each of these medications    - HYDROcodo Abs Final Neut Abs Lymphocyte Abso Monocyte Absolu Eosinophil Abso Basophil Absolu    (03/12/17)  62.1 (03/12/17)  27.3 (03/12/17)  9.2 (03/12/17)  0.8 (03/12/17)  0.2 -- (03/12/17)  5.93 (03/12/17)  2.61 (03/12/17)  0.88 (H) (03/12/17)  0.08 (03/12/17)  0 [employer]     Call the [facility] for assistance with your inactive [employer] account    If you have questions, you can call [phone] to talk to our [facility] Staff. Remember, [employer] is NOT to be used for urgent needs.   For medical emergencies